# Patient Record
Sex: FEMALE | Race: WHITE | NOT HISPANIC OR LATINO
[De-identification: names, ages, dates, MRNs, and addresses within clinical notes are randomized per-mention and may not be internally consistent; named-entity substitution may affect disease eponyms.]

---

## 2017-01-23 ENCOUNTER — RX RENEWAL (OUTPATIENT)
Age: 57
End: 2017-01-23

## 2017-01-25 ENCOUNTER — RX RENEWAL (OUTPATIENT)
Age: 57
End: 2017-01-25

## 2017-01-26 ENCOUNTER — RX RENEWAL (OUTPATIENT)
Age: 57
End: 2017-01-26

## 2017-02-10 ENCOUNTER — MESSAGE (OUTPATIENT)
Age: 57
End: 2017-02-10

## 2017-03-12 ENCOUNTER — RX RENEWAL (OUTPATIENT)
Age: 57
End: 2017-03-12

## 2017-03-13 ENCOUNTER — RX RENEWAL (OUTPATIENT)
Age: 57
End: 2017-03-13

## 2017-04-13 ENCOUNTER — RX RENEWAL (OUTPATIENT)
Age: 57
End: 2017-04-13

## 2017-05-19 ENCOUNTER — RX RENEWAL (OUTPATIENT)
Age: 57
End: 2017-05-19

## 2017-05-24 ENCOUNTER — RX RENEWAL (OUTPATIENT)
Age: 57
End: 2017-05-24

## 2017-05-26 ENCOUNTER — RX RENEWAL (OUTPATIENT)
Age: 57
End: 2017-05-26

## 2017-07-21 ENCOUNTER — RX RENEWAL (OUTPATIENT)
Age: 57
End: 2017-07-21

## 2017-07-25 ENCOUNTER — RX RENEWAL (OUTPATIENT)
Age: 57
End: 2017-07-25

## 2017-07-28 ENCOUNTER — APPOINTMENT (OUTPATIENT)
Dept: FAMILY MEDICINE | Facility: CLINIC | Age: 57
End: 2017-07-28

## 2017-07-28 ENCOUNTER — APPOINTMENT (OUTPATIENT)
Dept: FAMILY MEDICINE | Facility: CLINIC | Age: 57
End: 2017-07-28
Payer: COMMERCIAL

## 2017-07-28 VITALS
RESPIRATION RATE: 16 BRPM | SYSTOLIC BLOOD PRESSURE: 120 MMHG | BODY MASS INDEX: 30.95 KG/M2 | HEIGHT: 71 IN | WEIGHT: 221.06 LBS | OXYGEN SATURATION: 97 % | DIASTOLIC BLOOD PRESSURE: 90 MMHG | HEART RATE: 62 BPM

## 2017-07-28 DIAGNOSIS — Z86.39 PERSONAL HISTORY OF OTHER ENDOCRINE, NUTRITIONAL AND METABOLIC DISEASE: ICD-10-CM

## 2017-07-28 PROCEDURE — 99396 PREV VISIT EST AGE 40-64: CPT

## 2017-08-04 ENCOUNTER — APPOINTMENT (OUTPATIENT)
Dept: FAMILY MEDICINE | Facility: CLINIC | Age: 57
End: 2017-08-04

## 2017-08-31 LAB
ALBUMIN SERPL ELPH-MCNC: 4.5 G/DL
ALP BLD-CCNC: 63 U/L
ALT SERPL-CCNC: 26 U/L
ANION GAP SERPL CALC-SCNC: 16 MMOL/L
AST SERPL-CCNC: 20 U/L
BASOPHILS # BLD AUTO: 0.02 K/UL
BASOPHILS NFR BLD AUTO: 0.5 %
BILIRUB SERPL-MCNC: 0.4 MG/DL
BUN SERPL-MCNC: 15 MG/DL
CALCIUM SERPL-MCNC: 10.1 MG/DL
CHLORIDE SERPL-SCNC: 104 MMOL/L
CHOLEST SERPL-MCNC: 242 MG/DL
CHOLEST/HDLC SERPL: 2.7 RATIO
CO2 SERPL-SCNC: 22 MMOL/L
CREAT SERPL-MCNC: 0.96 MG/DL
EOSINOPHIL # BLD AUTO: 0.13 K/UL
EOSINOPHIL NFR BLD AUTO: 2.9 %
GLUCOSE SERPL-MCNC: 85 MG/DL
HCT VFR BLD CALC: 42.5 %
HDLC SERPL-MCNC: 90 MG/DL
HGB BLD-MCNC: 13.6 G/DL
IMM GRANULOCYTES NFR BLD AUTO: 0.2 %
LDLC SERPL CALC-MCNC: 113 MG/DL
LYMPHOCYTES # BLD AUTO: 1.7 K/UL
LYMPHOCYTES NFR BLD AUTO: 38.3 %
MAN DIFF?: NORMAL
MCHC RBC-ENTMCNC: 29.9 PG
MCHC RBC-ENTMCNC: 32 GM/DL
MCV RBC AUTO: 93.4 FL
MONOCYTES # BLD AUTO: 0.39 K/UL
MONOCYTES NFR BLD AUTO: 8.8 %
NEUTROPHILS # BLD AUTO: 2.19 K/UL
NEUTROPHILS NFR BLD AUTO: 49.3 %
PLATELET # BLD AUTO: 223 K/UL
POTASSIUM SERPL-SCNC: 4.2 MMOL/L
PROT SERPL-MCNC: 7.4 G/DL
RBC # BLD: 4.55 M/UL
RBC # FLD: 13.7 %
SODIUM SERPL-SCNC: 142 MMOL/L
TRIGL SERPL-MCNC: 196 MG/DL
WBC # FLD AUTO: 4.44 K/UL

## 2017-09-07 LAB
25(OH)D3 SERPL-MCNC: 38.3 NG/ML
HBA1C MFR BLD HPLC: 5.2 %
TSH SERPL-ACNC: 1.58 UIU/ML

## 2017-09-18 ENCOUNTER — RX RENEWAL (OUTPATIENT)
Age: 57
End: 2017-09-18

## 2017-11-06 ENCOUNTER — RX RENEWAL (OUTPATIENT)
Age: 57
End: 2017-11-06

## 2017-11-08 ENCOUNTER — TRANSCRIPTION ENCOUNTER (OUTPATIENT)
Age: 57
End: 2017-11-08

## 2017-12-10 ENCOUNTER — RX RENEWAL (OUTPATIENT)
Age: 57
End: 2017-12-10

## 2018-01-16 ENCOUNTER — RX RENEWAL (OUTPATIENT)
Age: 58
End: 2018-01-16

## 2018-01-17 ENCOUNTER — RX RENEWAL (OUTPATIENT)
Age: 58
End: 2018-01-17

## 2018-01-31 ENCOUNTER — RX RENEWAL (OUTPATIENT)
Age: 58
End: 2018-01-31

## 2018-02-05 ENCOUNTER — MEDICATION RENEWAL (OUTPATIENT)
Age: 58
End: 2018-02-05

## 2018-02-05 ENCOUNTER — RX RENEWAL (OUTPATIENT)
Age: 58
End: 2018-02-05

## 2018-02-13 ENCOUNTER — APPOINTMENT (OUTPATIENT)
Dept: FAMILY MEDICINE | Facility: CLINIC | Age: 58
End: 2018-02-13
Payer: COMMERCIAL

## 2018-02-13 VITALS
WEIGHT: 220.5 LBS | OXYGEN SATURATION: 95 % | BODY MASS INDEX: 30.87 KG/M2 | DIASTOLIC BLOOD PRESSURE: 98 MMHG | HEART RATE: 64 BPM | HEIGHT: 71 IN | RESPIRATION RATE: 16 BRPM | SYSTOLIC BLOOD PRESSURE: 138 MMHG

## 2018-02-13 PROCEDURE — 99214 OFFICE O/P EST MOD 30 MIN: CPT

## 2018-03-27 ENCOUNTER — RX RENEWAL (OUTPATIENT)
Age: 58
End: 2018-03-27

## 2018-07-03 ENCOUNTER — RX RENEWAL (OUTPATIENT)
Age: 58
End: 2018-07-03

## 2018-07-16 ENCOUNTER — RX RENEWAL (OUTPATIENT)
Age: 58
End: 2018-07-16

## 2018-07-17 LAB
25(OH)D3 SERPL-MCNC: 38.3 NG/ML
ALBUMIN SERPL ELPH-MCNC: 4.5 G/DL
ALP BLD-CCNC: 77 U/L
ALT SERPL-CCNC: 17 U/L
ANION GAP SERPL CALC-SCNC: 11 MMOL/L
AST SERPL-CCNC: 18 U/L
BASOPHILS # BLD AUTO: 0.02 K/UL
BASOPHILS NFR BLD AUTO: 0.5 %
BILIRUB SERPL-MCNC: 0.4 MG/DL
BUN SERPL-MCNC: 16 MG/DL
CALCIUM SERPL-MCNC: 9.6 MG/DL
CHLORIDE SERPL-SCNC: 105 MMOL/L
CHOLEST SERPL-MCNC: 209 MG/DL
CHOLEST/HDLC SERPL: 2.5 RATIO
CO2 SERPL-SCNC: 23 MMOL/L
CREAT SERPL-MCNC: 0.81 MG/DL
EOSINOPHIL # BLD AUTO: 0.07 K/UL
EOSINOPHIL NFR BLD AUTO: 1.7 %
GLUCOSE SERPL-MCNC: 85 MG/DL
HBA1C MFR BLD HPLC: 5.3 %
HCT VFR BLD CALC: 41.3 %
HDLC SERPL-MCNC: 84 MG/DL
HGB BLD-MCNC: 13.7 G/DL
IMM GRANULOCYTES NFR BLD AUTO: 0.2 %
LDLC SERPL CALC-MCNC: 107 MG/DL
LYMPHOCYTES # BLD AUTO: 1.6 K/UL
LYMPHOCYTES NFR BLD AUTO: 39.4 %
MAN DIFF?: NORMAL
MCHC RBC-ENTMCNC: 31.1 PG
MCHC RBC-ENTMCNC: 33.2 GM/DL
MCV RBC AUTO: 93.7 FL
MONOCYTES # BLD AUTO: 0.31 K/UL
MONOCYTES NFR BLD AUTO: 7.6 %
NEUTROPHILS # BLD AUTO: 2.05 K/UL
NEUTROPHILS NFR BLD AUTO: 50.6 %
PLATELET # BLD AUTO: 214 K/UL
POTASSIUM SERPL-SCNC: 4.5 MMOL/L
PROT SERPL-MCNC: 7 G/DL
RBC # BLD: 4.41 M/UL
RBC # FLD: 13.9 %
SODIUM SERPL-SCNC: 139 MMOL/L
TRIGL SERPL-MCNC: 89 MG/DL
TSH SERPL-ACNC: 0.27 UIU/ML
WBC # FLD AUTO: 4.06 K/UL

## 2018-07-18 ENCOUNTER — RX RENEWAL (OUTPATIENT)
Age: 58
End: 2018-07-18

## 2018-08-06 ENCOUNTER — RX RENEWAL (OUTPATIENT)
Age: 58
End: 2018-08-06

## 2018-08-17 ENCOUNTER — APPOINTMENT (OUTPATIENT)
Dept: FAMILY MEDICINE | Facility: CLINIC | Age: 58
End: 2018-08-17
Payer: COMMERCIAL

## 2018-08-17 VITALS
HEART RATE: 64 BPM | HEIGHT: 71 IN | SYSTOLIC BLOOD PRESSURE: 108 MMHG | WEIGHT: 213.06 LBS | BODY MASS INDEX: 29.83 KG/M2 | OXYGEN SATURATION: 99 % | DIASTOLIC BLOOD PRESSURE: 100 MMHG | RESPIRATION RATE: 16 BRPM

## 2018-08-17 VITALS — DIASTOLIC BLOOD PRESSURE: 82 MMHG | SYSTOLIC BLOOD PRESSURE: 120 MMHG

## 2018-08-17 DIAGNOSIS — Z86.69 PERSONAL HISTORY OF OTHER DISEASES OF THE NERVOUS SYSTEM AND SENSE ORGANS: ICD-10-CM

## 2018-08-17 DIAGNOSIS — Z86.39 PERSONAL HISTORY OF OTHER ENDOCRINE, NUTRITIONAL AND METABOLIC DISEASE: ICD-10-CM

## 2018-08-17 DIAGNOSIS — H00.036 ABSCESS OF EYELID LEFT EYE, UNSPECIFIED EYELID: ICD-10-CM

## 2018-08-17 PROCEDURE — 99396 PREV VISIT EST AGE 40-64: CPT

## 2018-08-27 PROBLEM — Z86.39 HISTORY OF OBESITY: Status: RESOLVED | Noted: 2017-07-28 | Resolved: 2018-08-27

## 2018-08-27 PROBLEM — Z86.69 HISTORY OF CONJUNCTIVITIS: Status: RESOLVED | Noted: 2018-02-13 | Resolved: 2018-08-27

## 2018-08-27 NOTE — REVIEW OF SYSTEMS
[Nocturia] : nocturia [Joint Pain] : joint pain [Back Pain] : back pain [Headache] : headache [Insomnia] : insomnia [Anxiety] : anxiety [Depression] : depression [Vision Problems] : vision problems [Fever] : no fever [Chills] : no chills [Fatigue] : no fatigue [Hot Flashes] : no hot flashes [Night Sweats] : no night sweats [Recent Change In Weight] : ~T no recent weight change [Discharge] : no discharge [Pain] : no pain [Redness] : no redness [Dryness] : no dryness  [Itching] : no itching [Earache] : no earache [Hearing Loss] : no hearing loss [Nosebleed] : no nosebleeds [Hoarseness] : no hoarseness [Nasal Discharge] : no nasal discharge [Sore Throat] : no sore throat [Postnasal Drip] : no postnasal drip [Chest Pain] : no chest pain [Palpitations] : no palpitations [Leg Claudication] : no leg claudication [Lower Ext Edema] : no lower extremity edema [Orthopnea] : no orthopnea [Paroysmal Nocturnal Dyspnea] : no paroysmal nocturnal dyspnea [Shortness Of Breath] : no shortness of breath [Wheezing] : no wheezing [Cough] : no cough [Dyspnea on Exertion] : no dyspnea on exertion [Abdominal Pain] : no abdominal pain [Nausea] : no nausea [Constipation] : no constipation [Diarrhea] : diarrhea [Vomiting] : no vomiting [Heartburn] : no heartburn [Melena] : no melena [Dysuria] : no dysuria [Incontinence] : no incontinence [Poor Libido] : libido not poor [Hematuria] : no hematuria [Frequency] : no frequency [Vaginal Discharge] : no vaginal discharge [Dysmenorrhea] : no dysmenorrhea [Joint Stiffness] : no joint stiffness [Joint Swelling] : no joint swelling [Muscle Weakness] : no muscle weakness [Itching] : no itching [Mole Changes] : no mole changes [Nail Changes] : no nail changes [Hair Changes] : no hair changes [Skin Rash] : no skin rash [Dizziness] : no dizziness [Fainting] : no fainting [Confusion] : no confusion [Memory Loss] : no memory loss [Unsteady Walking] : no ataxia [Suicidal] : not suicidal [Easy Bleeding] : no easy bleeding [Easy Bruising] : no easy bruising [Swollen Glands] : no swollen glands [FreeTextEntry3] : reading glasses  [FreeTextEntry7] : colonoscopy 2015, due 2020 [FreeTextEntry8] : lmp hysterectomy age 43 yo- pelvic floor [FreeTextEntry9] : right knee and hip [de-identified] : migranes [de-identified] : insomnia

## 2018-08-27 NOTE — COUNSELING
[Weight management counseling provided] : Weight management [Healthy eating counseling provided] : healthy eating [Activity counseling provided] : activity [___ min/wk activity recommended] : [unfilled] min/wk activity recommended [Engage in a relaxing activity] : Engage in a relaxing activity

## 2018-08-27 NOTE — PHYSICAL EXAM
[No Acute Distress] : no acute distress [Well Nourished] : well nourished [Well Developed] : well developed [Well-Appearing] : well-appearing [Normal Voice/Communication] : normal voice/communication [Normal Sclera/Conjunctiva] : normal sclera/conjunctiva [PERRL] : pupils equal round and reactive to light [EOMI] : extraocular movements intact [Normal Outer Ear/Nose] : the outer ears and nose were normal in appearance [Normal Oropharynx] : the oropharynx was normal [Normal TMs] : both tympanic membranes were normal [No JVD] : no jugular venous distention [Supple] : supple [No Lymphadenopathy] : no lymphadenopathy [Thyroid Normal, No Nodules] : the thyroid was normal and there were no nodules present [No Respiratory Distress] : no respiratory distress  [Clear to Auscultation] : lungs were clear to auscultation bilaterally [No Accessory Muscle Use] : no accessory muscle use [Normal Rate] : normal rate  [Regular Rhythm] : with a regular rhythm [Normal S1, S2] : normal S1 and S2 [Pedal Pulses Present] : the pedal pulses are present [No Edema] : there was no peripheral edema [No Extremity Clubbing/Cyanosis] : no extremity clubbing/cyanosis [Soft] : abdomen soft [Non Tender] : non-tender [Non-distended] : non-distended [No Masses] : no abdominal mass palpated [No HSM] : no HSM [Normal Supraclavicular Nodes] : no supraclavicular lymphadenopathy [Normal Anterior Cervical Nodes] : no anterior cervical lymphadenopathy [No Joint Swelling] : no joint swelling [Grossly Normal Strength/Tone] : grossly normal strength/tone [No Rash] : no rash [No Skin Lesions] : no skin lesions [Normal Gait] : normal gait [Coordination Grossly Intact] : coordination grossly intact [No Focal Deficits] : no focal deficits [Speech Grossly Normal] : speech grossly normal [Memory Grossly Normal] : memory grossly normal [Normal Affect] : the affect was normal [Alert and Oriented x3] : oriented to person, place, and time [Normal Mood] : the mood was normal [Normal Insight/Judgement] : insight and judgment were intact

## 2018-08-27 NOTE — ASSESSMENT
[FreeTextEntry1] : blood work results reviewed with patient during visit\par RTO 3 months for f/u BP, weight ck

## 2018-09-06 ENCOUNTER — RX RENEWAL (OUTPATIENT)
Age: 58
End: 2018-09-06

## 2018-09-19 ENCOUNTER — APPOINTMENT (OUTPATIENT)
Dept: FAMILY MEDICINE | Facility: CLINIC | Age: 58
End: 2018-09-19
Payer: COMMERCIAL

## 2018-09-19 VITALS
WEIGHT: 209.56 LBS | TEMPERATURE: 98.2 F | OXYGEN SATURATION: 100 % | RESPIRATION RATE: 16 BRPM | HEIGHT: 71 IN | BODY MASS INDEX: 29.34 KG/M2 | HEART RATE: 60 BPM | SYSTOLIC BLOOD PRESSURE: 138 MMHG | DIASTOLIC BLOOD PRESSURE: 90 MMHG

## 2018-09-19 LAB
BILIRUB UR QL STRIP: NEGATIVE
CLARITY UR: ABNORMAL
COLLECTION METHOD: NORMAL
GLUCOSE UR-MCNC: NEGATIVE
HCG UR QL: 0.2 EU/DL
HGB UR QL STRIP.AUTO: ABNORMAL
KETONES UR-MCNC: NEGATIVE
LEUKOCYTE ESTERASE UR QL STRIP: ABNORMAL
NITRITE UR QL STRIP: NEGATIVE
PH UR STRIP: 6.5
PROT UR STRIP-MCNC: NEGATIVE
SP GR UR STRIP: 1.01

## 2018-09-19 PROCEDURE — 81003 URINALYSIS AUTO W/O SCOPE: CPT | Mod: QW

## 2018-09-19 PROCEDURE — 99214 OFFICE O/P EST MOD 30 MIN: CPT | Mod: 25

## 2018-09-19 NOTE — PHYSICAL EXAM
[No Acute Distress] : no acute distress [Well Nourished] : well nourished [Well Developed] : well developed [Well-Appearing] : well-appearing [Normal Voice/Communication] : normal voice/communication [Normal Sclera/Conjunctiva] : normal sclera/conjunctiva [No Respiratory Distress] : no respiratory distress  [Clear to Auscultation] : lungs were clear to auscultation bilaterally [No Accessory Muscle Use] : no accessory muscle use [Normal Rate] : normal rate  [Regular Rhythm] : with a regular rhythm [Normal S1, S2] : normal S1 and S2 [Soft] : abdomen soft [Non Tender] : non-tender [Non-distended] : non-distended [No Masses] : no abdominal mass palpated [No HSM] : no HSM

## 2018-09-19 NOTE — HISTORY OF PRESENT ILLNESS
[FreeTextEntry8] : Patient presents c/o symptoms of frequent urination. Has h/o multiple UTIs, and takes prophylactic antibiotic after intercourse. No fever, no back pain. \par \par ROS: negative except as noted above\par

## 2018-09-27 LAB — BACTERIA UR CULT: ABNORMAL

## 2018-10-08 ENCOUNTER — RX RENEWAL (OUTPATIENT)
Age: 58
End: 2018-10-08

## 2018-10-10 ENCOUNTER — RX RENEWAL (OUTPATIENT)
Age: 58
End: 2018-10-10

## 2018-10-11 ENCOUNTER — RX RENEWAL (OUTPATIENT)
Age: 58
End: 2018-10-11

## 2018-10-11 RX ORDER — ZOLPIDEM TARTRATE 5 MG/1
5 TABLET ORAL
Qty: 30 | Refills: 0 | Status: DISCONTINUED | COMMUNITY
Start: 2017-12-10 | End: 2018-10-11

## 2018-11-19 ENCOUNTER — RX RENEWAL (OUTPATIENT)
Age: 58
End: 2018-11-19

## 2018-12-02 ENCOUNTER — TRANSCRIPTION ENCOUNTER (OUTPATIENT)
Age: 58
End: 2018-12-02

## 2018-12-03 ENCOUNTER — APPOINTMENT (OUTPATIENT)
Dept: UROGYNECOLOGY | Facility: CLINIC | Age: 58
End: 2018-12-03
Payer: COMMERCIAL

## 2018-12-03 LAB
BILIRUB UR QL STRIP: NORMAL
CLARITY UR: CLEAR
COLLECTION METHOD: NORMAL
GLUCOSE UR-MCNC: NORMAL
HCG UR QL: 0.2 EU/DL
HGB UR QL STRIP.AUTO: NORMAL
KETONES UR-MCNC: NORMAL
LEUKOCYTE ESTERASE UR QL STRIP: NORMAL
NITRITE UR QL STRIP: NORMAL
PH UR STRIP: 5.5
PROT UR STRIP-MCNC: NORMAL
SP GR UR STRIP: 1.01

## 2018-12-03 PROCEDURE — 99215 OFFICE O/P EST HI 40 MIN: CPT

## 2018-12-17 ENCOUNTER — APPOINTMENT (OUTPATIENT)
Dept: UROGYNECOLOGY | Facility: CLINIC | Age: 58
End: 2018-12-17
Payer: COMMERCIAL

## 2018-12-17 ENCOUNTER — OUTPATIENT (OUTPATIENT)
Dept: OUTPATIENT SERVICES | Facility: HOSPITAL | Age: 58
LOS: 1 days | End: 2018-12-17
Payer: COMMERCIAL

## 2018-12-17 VITALS — HEART RATE: 64 BPM | DIASTOLIC BLOOD PRESSURE: 85 MMHG | SYSTOLIC BLOOD PRESSURE: 138 MMHG

## 2018-12-17 DIAGNOSIS — Z90.710 ACQUIRED ABSENCE OF BOTH CERVIX AND UTERUS: Chronic | ICD-10-CM

## 2018-12-17 DIAGNOSIS — Z01.818 ENCOUNTER FOR OTHER PREPROCEDURAL EXAMINATION: ICD-10-CM

## 2018-12-17 PROCEDURE — 52000 CYSTOURETHROSCOPY: CPT

## 2018-12-18 DIAGNOSIS — N39.3 STRESS INCONTINENCE (FEMALE) (MALE): ICD-10-CM

## 2018-12-20 ENCOUNTER — OUTPATIENT (OUTPATIENT)
Dept: OUTPATIENT SERVICES | Facility: HOSPITAL | Age: 58
LOS: 1 days | End: 2018-12-20

## 2018-12-20 ENCOUNTER — APPOINTMENT (OUTPATIENT)
Dept: UROGYNECOLOGY | Facility: CLINIC | Age: 58
End: 2018-12-20
Payer: COMMERCIAL

## 2018-12-20 DIAGNOSIS — Z90.710 ACQUIRED ABSENCE OF BOTH CERVIX AND UTERUS: Chronic | ICD-10-CM

## 2018-12-20 PROCEDURE — 51784 ANAL/URINARY MUSCLE STUDY: CPT | Mod: 26

## 2018-12-20 PROCEDURE — 51797 INTRAABDOMINAL PRESSURE TEST: CPT | Mod: 26

## 2018-12-20 PROCEDURE — 51729 CYSTOMETROGRAM W/VP&UP: CPT | Mod: 26

## 2019-01-07 ENCOUNTER — APPOINTMENT (OUTPATIENT)
Dept: UROGYNECOLOGY | Facility: CLINIC | Age: 59
End: 2019-01-07
Payer: COMMERCIAL

## 2019-01-07 DIAGNOSIS — N39.3 STRESS INCONTINENCE (FEMALE) (MALE): ICD-10-CM

## 2019-01-07 PROCEDURE — 99214 OFFICE O/P EST MOD 30 MIN: CPT

## 2019-01-07 RX ORDER — MOXIFLOXACIN OPHTHALMIC 5 MG/ML
0.5 SOLUTION/ DROPS OPHTHALMIC 3 TIMES DAILY
Qty: 1 | Refills: 3 | Status: COMPLETED | COMMUNITY
Start: 2018-02-13 | End: 2019-01-07

## 2019-01-07 RX ORDER — CIPROFLOXACIN HYDROCHLORIDE 500 MG/1
500 TABLET, FILM COATED ORAL TWICE DAILY
Qty: 10 | Refills: 0 | Status: COMPLETED | COMMUNITY
Start: 2018-09-19 | End: 2019-01-07

## 2019-01-07 NOTE — HISTORY OF PRESENT ILLNESS
[FreeTextEntry1] : Patient returns today for followup on her pelvic organ prolapse. Review of symptoms was unchanged from her visit here December 3, 2018. On exam that day she ectopic U. stage II pelvic organ prolapse with vaginal vault prolapse cystocele and rectocele. She underwent urodynamic testing which revealed stress urinary incontinence. The patient had a cystoscopy in December which was negative. I reviewed these findings with her.Treatment options for the prolapse were discussed and included doing nothing, Kegel exercises and behavioral modification, a pessary, or surgical correction. She is interested in surgical correction.Surgically we discussed the abdominal vs the vaginal routes. Abdominally we discussed sacral colpopexy either via laparotomy or laparoscopically and robotically.  Vaginally we discussed a native tissue repair versus vaginal mesh reconstruction. We discussed the FDA warning on transvaginal mesh. We discussed the use of biologic for repairs. She is interested in surgical correction and wishes to proceed via a laparoscopic/robotically with a sacral colpopexy. She was shown an example of the mesh. We discussed the stress incontinence as well as treatment options and she wishes to proceed with a mid urethral sling at the time of surgical correction of the stress incontinence. We discussed the possibility of failure and going home with the catheter. All questions were answered and she wishes to schedule surgery

## 2019-02-04 ENCOUNTER — APPOINTMENT (OUTPATIENT)
Dept: UROGYNECOLOGY | Facility: CLINIC | Age: 59
End: 2019-02-04
Payer: COMMERCIAL

## 2019-02-04 DIAGNOSIS — N36.41 HYPERMOBILITY OF URETHRA: ICD-10-CM

## 2019-02-04 DIAGNOSIS — N81.11 CYSTOCELE, MIDLINE: ICD-10-CM

## 2019-02-04 PROCEDURE — 99214 OFFICE O/P EST MOD 30 MIN: CPT

## 2019-02-04 NOTE — PHYSICAL EXAM
[No Acute Distress] : in no acute distress [Well developed] : well developed [Well Nourished] : ~L well nourished [Oriented x3] : oriented to person, place, and time [Normal Mood/Affect] : mood and affect are normal [Soft, Nontender] : the abdomen was soft and nontender [Warm and Dry] : was warm and dry to touch [Normal Gait] : gait was normal [Normal] : normal external genitalia [Vulvar Atrophy] : vulvar atrophy [Labia Majora] : were normal [Labia Minora] : were normal [Normal Appearance] : general appearance was normal [Atrophy] : atrophy [Cystocele] : a cystocele [No Bleeding] : there was no active vaginal bleeding [2] : 2 [Aa ____] : Aa [unfilled] [Ba ____] : Ba [unfilled] [C ____] : C [unfilled] [GH ____] : GH [unfilled] [PB ____] : PB [unfilled] [TVL ____] : TVL  [unfilled] [Ap ____] : Ap [unfilled] [Bp ____] : Bp [unfilled] [Absent] : absent [Uterine Adnexae] : were not tender and not enlarged [Exam Deferred] : was deferred [Anxiety] : patient is not anxious [Tenderness] : ~T no ~M abdominal tenderness observed [Distended] : not distended [H/Smegaly] : no hepatosplenomegaly [FreeTextEntry3] : + hypermobility based on POPQ [FreeTextEntry4] : + vv prolaspe

## 2019-02-04 NOTE — DISCUSSION/SUMMARY
[Reviewed Clinical Lab Test(s)] : Results of clinical tests were reviewed. [Discuss Alternatives/Risks/Benefits w/Patient] : All alternatives, risks, and benefits were discussed with the patient/family and all questions were answered.  Patient expressed good understanding and appreciates the importance of follow up as recommended. [FreeTextEntry1] : Plan: Robotic assisted bilateral salpingectomy, sacrocolpopexy, retropubic MUS, cystoscopy, possible anterior/posterior repair, possible oophorectomy, possible laparotomy \par \par Counseling: \par Patient counseled on r/b/a of the above procedures. Alternatives include continue observations, PFMT, pessary use, and surgery, and pt continues to want to proceed with surgical management. Risks were discussed. Risks include bleeding, possibly needing a transfusion, infection, and damage to surrounding structures such as bowel, bladder, ureters, blood vessels, and nerves. She also understands that there is a risk of needing to convert to an open procedure or laparotomy. We discussed the risks and benefits of salpingectomy and oophorectomy at time of surgery. Pt wishes to proceed with salpingectomy. Ovaries will remain in situ unless there are abnormal findings, at which time pt would like an oophorectomy. There is also a risk of pain - short term following surgery and a risk of chronic pain or dyspareunia. She understands there is a risk of recurrent prolapse which may require additional treatment or surgery in the future.  We reviewed her UDS findings of stress incontinence, and pt wishes to proceed with a midurethral sling. We discussed retropubic versus trans-obturator approaches including risk of pain, bladder injury, retention, and success, and pt wishes to proceed with a retropubic MS. We discussed the possibility of UTI or urinary retention following surgery with the possible need for prolonged catheterization. We also discussed the post-operative restrictions and expectations. Patient expressed understanding and wishes to proceed. We discussed the risks and benefits of using [place] scientific mesh as well as the 60 minutes report regarding the resin.  [employer] response to patients was given to her.  She wishes to proceed with the use of the BSC mesh in the surgery. Conest was signed.\par \par

## 2019-02-06 ENCOUNTER — RESULT REVIEW (OUTPATIENT)
Age: 59
End: 2019-02-06

## 2019-02-06 LAB — BACTERIA UR CULT: NORMAL

## 2019-02-08 ENCOUNTER — OUTPATIENT (OUTPATIENT)
Dept: OUTPATIENT SERVICES | Facility: HOSPITAL | Age: 59
LOS: 1 days | End: 2019-02-08
Payer: COMMERCIAL

## 2019-02-08 VITALS
WEIGHT: 199.08 LBS | HEIGHT: 71 IN | TEMPERATURE: 99 F | SYSTOLIC BLOOD PRESSURE: 131 MMHG | OXYGEN SATURATION: 98 % | HEART RATE: 61 BPM | DIASTOLIC BLOOD PRESSURE: 84 MMHG

## 2019-02-08 DIAGNOSIS — N81.6 RECTOCELE: ICD-10-CM

## 2019-02-08 DIAGNOSIS — N99.3 PROLAPSE OF VAGINAL VAULT AFTER HYSTERECTOMY: ICD-10-CM

## 2019-02-08 DIAGNOSIS — I10 ESSENTIAL (PRIMARY) HYPERTENSION: ICD-10-CM

## 2019-02-08 DIAGNOSIS — N39.3 STRESS INCONTINENCE (FEMALE) (MALE): ICD-10-CM

## 2019-02-08 DIAGNOSIS — N81.11 CYSTOCELE, MIDLINE: ICD-10-CM

## 2019-02-08 DIAGNOSIS — Z96.642 PRESENCE OF LEFT ARTIFICIAL HIP JOINT: Chronic | ICD-10-CM

## 2019-02-08 DIAGNOSIS — E03.9 HYPOTHYROIDISM, UNSPECIFIED: ICD-10-CM

## 2019-02-08 DIAGNOSIS — Z01.84 ENCOUNTER FOR ANTIBODY RESPONSE EXAMINATION: ICD-10-CM

## 2019-02-08 DIAGNOSIS — Z90.710 ACQUIRED ABSENCE OF BOTH CERVIX AND UTERUS: Chronic | ICD-10-CM

## 2019-02-08 LAB
ANION GAP SERPL CALC-SCNC: 13 MMOL/L — SIGNIFICANT CHANGE UP (ref 5–17)
BLD GP AB SCN SERPL QL: NEGATIVE — SIGNIFICANT CHANGE UP
BUN SERPL-MCNC: 16 MG/DL — SIGNIFICANT CHANGE UP (ref 7–23)
CALCIUM SERPL-MCNC: 9.8 MG/DL — SIGNIFICANT CHANGE UP (ref 8.4–10.5)
CHLORIDE SERPL-SCNC: 106 MMOL/L — SIGNIFICANT CHANGE UP (ref 96–108)
CO2 SERPL-SCNC: 23 MMOL/L — SIGNIFICANT CHANGE UP (ref 22–31)
CREAT SERPL-MCNC: 0.73 MG/DL — SIGNIFICANT CHANGE UP (ref 0.5–1.3)
GLUCOSE SERPL-MCNC: 90 MG/DL — SIGNIFICANT CHANGE UP (ref 70–99)
HCT VFR BLD CALC: 41.8 % — SIGNIFICANT CHANGE UP (ref 34.5–45)
HGB BLD-MCNC: 13.4 G/DL — SIGNIFICANT CHANGE UP (ref 11.5–15.5)
MCHC RBC-ENTMCNC: 30.2 PG — SIGNIFICANT CHANGE UP (ref 27–34)
MCHC RBC-ENTMCNC: 32.1 GM/DL — SIGNIFICANT CHANGE UP (ref 32–36)
MCV RBC AUTO: 94.4 FL — SIGNIFICANT CHANGE UP (ref 80–100)
PLATELET # BLD AUTO: 205 K/UL — SIGNIFICANT CHANGE UP (ref 150–400)
POTASSIUM SERPL-MCNC: 4 MMOL/L — SIGNIFICANT CHANGE UP (ref 3.5–5.3)
POTASSIUM SERPL-SCNC: 4 MMOL/L — SIGNIFICANT CHANGE UP (ref 3.5–5.3)
RBC # BLD: 4.43 M/UL — SIGNIFICANT CHANGE UP (ref 3.8–5.2)
RBC # FLD: 14.1 % — SIGNIFICANT CHANGE UP (ref 10.3–14.5)
RH IG SCN BLD-IMP: POSITIVE — SIGNIFICANT CHANGE UP
SODIUM SERPL-SCNC: 142 MMOL/L — SIGNIFICANT CHANGE UP (ref 135–145)
WBC # BLD: 4.43 K/UL — SIGNIFICANT CHANGE UP (ref 3.8–10.5)
WBC # FLD AUTO: 4.43 K/UL — SIGNIFICANT CHANGE UP (ref 3.8–10.5)

## 2019-02-08 PROCEDURE — 86850 RBC ANTIBODY SCREEN: CPT

## 2019-02-08 PROCEDURE — 86901 BLOOD TYPING SEROLOGIC RH(D): CPT

## 2019-02-08 PROCEDURE — 80048 BASIC METABOLIC PNL TOTAL CA: CPT

## 2019-02-08 PROCEDURE — 86900 BLOOD TYPING SEROLOGIC ABO: CPT

## 2019-02-08 PROCEDURE — G0463: CPT

## 2019-02-08 PROCEDURE — 85027 COMPLETE CBC AUTOMATED: CPT

## 2019-02-08 RX ORDER — CEFOTETAN DISODIUM 1 G
2 VIAL (EA) INJECTION ONCE
Qty: 0 | Refills: 0 | Status: DISCONTINUED | OUTPATIENT
Start: 2019-02-19 | End: 2019-03-06

## 2019-02-08 RX ORDER — LIDOCAINE HCL 20 MG/ML
0.2 VIAL (ML) INJECTION ONCE
Qty: 0 | Refills: 0 | Status: DISCONTINUED | OUTPATIENT
Start: 2019-02-19 | End: 2019-03-06

## 2019-02-08 NOTE — H&P PST ADULT - PROBLEM SELECTOR PLAN 1
Cystoscopy, sling, Robotic laparoscopic Colpopexy on 2/19/2019.  Pre- Op instructions  discussed  CBc,BMp,type and screen sent  urine culture - reviewed from 2/4/19- no growth

## 2019-02-08 NOTE — H&P PST ADULT - PMH
Bulging lumbar disc  L4-5; treated with PINA (last in June 2013)  Cervical disc disease  herniated discs; unsure levels (asymptomatic)  Cluster headache    Diverticulosis  asymptomatic  Esophageal spasm  endoscopy and colonoscopy negative; no reflux  HTN (hypertension)    Hyperlipemia    Hypothyroid    Low back pain    Migraine    Osteoarthritis    Sciatica  left > right  Urinary frequency Bulging lumbar disc  L4-5; treated with PINA (last in June 2013)  Cervical disc disease  herniated discs; unsure levels (asymptomatic)  Cluster headache    Diverticulosis  asymptomatic  Esophageal spasm  endoscopy and colonoscopy negative; no reflux  HTN (hypertension)    Hyperlipemia    Hypothyroid    Osteoarthritis    Prolapse of vaginal vault after hysterectomy    Rectocele    Stress incontinence in female  Cystocele ,Midline

## 2019-02-08 NOTE — H&P PST ADULT - ACTIVITY
pt is very active able to participate for strenous ecoee pt is very active able to participate for strenuous ecoee

## 2019-02-08 NOTE — H&P PST ADULT - NSANTHOSAYNRD_GEN_A_CORE
No. MEETA screening performed.  STOP BANG Legend: 0-2 = LOW Risk; 3-4 = INTERMEDIATE Risk; 5-8 = HIGH Risk

## 2019-02-08 NOTE — H&P PST ADULT - HISTORY OF PRESENT ILLNESS
54 yo female presents with 8 year history of what she thought was left sciatic pain and treated it conservatively with prn NSAIDS. Pain worsened over last one and a half years. Underwent PT without relief. One cortisone injection without relief. Pain 6/10 at rest and 8-10/10 with activity. Pain relieved in past with aleve (last dose 3 days ago) 59 y/o female with PMH of HTN, HLD, Hypothyroidism , OA s/p left hip replacement in 2014, hysterectomy . Pt has been experiencing vaginal  bulging , stress incontinence followed by GYN . Presents to PST for scheduled Cystoscopy, sling, Robotic laparoscopic Colpopexy on 2/19/2019.

## 2019-02-08 NOTE — H&P PST ADULT - NEGATIVE GENERAL GENITOURINARY SYMPTOMS
no hematuria/no dysuria/no flank pain L/no renal colic/no flank pain R/no urinary hesitancy/no nocturia

## 2019-02-08 NOTE — H&P PST ADULT - PSH
History of total left hip replacement    Normal vaginal delivery  x 3  S/P hysterectomy  2004 after prolapse History of total left hip replacement  2014  Normal vaginal delivery  x 3  S/P hysterectomy  2004 after prolapse

## 2019-02-13 ENCOUNTER — APPOINTMENT (OUTPATIENT)
Dept: FAMILY MEDICINE | Facility: CLINIC | Age: 59
End: 2019-02-13
Payer: COMMERCIAL

## 2019-02-13 VITALS
HEART RATE: 60 BPM | SYSTOLIC BLOOD PRESSURE: 132 MMHG | RESPIRATION RATE: 16 BRPM | DIASTOLIC BLOOD PRESSURE: 80 MMHG | WEIGHT: 199 LBS | HEIGHT: 71 IN | TEMPERATURE: 98.4 F | OXYGEN SATURATION: 97 % | BODY MASS INDEX: 27.86 KG/M2

## 2019-02-13 DIAGNOSIS — Z86.2 PERSONAL HISTORY OF DISEASES OF THE BLOOD AND BLOOD-FORMING ORGANS AND CERTAIN DISORDERS INVOLVING THE IMMUNE MECHANISM: ICD-10-CM

## 2019-02-13 PROCEDURE — 99214 OFFICE O/P EST MOD 30 MIN: CPT

## 2019-02-13 NOTE — REVIEW OF SYSTEMS
[Fever] : no fever [Chills] : no chills [Fatigue] : no fatigue [Hot Flashes] : no hot flashes [Night Sweats] : no night sweats [Recent Change In Weight] : ~T recent weight change [Discharge] : no discharge [Pain] : no pain [Redness] : no redness [Dryness] : no dryness  [Vision Problems] : no vision problems [Itching] : no itching [Earache] : no earache [Hearing Loss] : no hearing loss [Nosebleed] : no nosebleeds [Hoarseness] : no hoarseness [Nasal Discharge] : no nasal discharge [Sore Throat] : no sore throat [Postnasal Drip] : no postnasal drip [Chest Pain] : no chest pain [Palpitations] : no palpitations [Leg Claudication] : no leg claudication [Lower Ext Edema] : no lower extremity edema [Orthopnea] : no orthopnea [Paroysmal Nocturnal Dyspnea] : no paroysmal nocturnal dyspnea [Shortness Of Breath] : no shortness of breath [Wheezing] : no wheezing [Cough] : no cough [Dyspnea on Exertion] : no dyspnea on exertion [Abdominal Pain] : no abdominal pain [Nausea] : no nausea [Constipation] : no constipation [Diarrhea] : diarrhea [Vomiting] : no vomiting [Heartburn] : no heartburn [Melena] : no melena [Dysuria] : no dysuria [Incontinence] : no incontinence [Nocturia] : nocturia [Hematuria] : no hematuria [Frequency] : no frequency [Vaginal Discharge] : no vaginal discharge [Dysmenorrhea] : no dysmenorrhea [Joint Pain] : no joint pain [Joint Stiffness] : no joint stiffness [Joint Swelling] : no joint swelling [Muscle Weakness] : no muscle weakness [Muscle Pain] : no muscle pain [Back Pain] : no back pain [Mole Changes] : no mole changes [Nail Changes] : no nail changes [Hair Changes] : no hair changes [Skin Rash] : no skin rash [Headache] : no headache [Dizziness] : no dizziness [Fainting] : no fainting [Confusion] : no confusion [Memory Loss] : no memory loss [Suicidal] : not suicidal [Insomnia] : insomnia [Anxiety] : no anxiety [Depression] : no depression [Easy Bleeding] : no easy bleeding [Easy Bruising] : no easy bruising [Swollen Glands] : no swollen glands [FreeTextEntry2] : trying to lose weight over past 1 year [FreeTextEntry4] : +tinnitus has seen ENT

## 2019-02-13 NOTE — PHYSICAL EXAM
[No Acute Distress] : no acute distress [Well Nourished] : well nourished [Well Developed] : well developed [Well-Appearing] : well-appearing [Normal Voice/Communication] : normal voice/communication [Normal Sclera/Conjunctiva] : normal sclera/conjunctiva [PERRL] : pupils equal round and reactive to light [EOMI] : extraocular movements intact [Normal Outer Ear/Nose] : the outer ears and nose were normal in appearance [Normal Oropharynx] : the oropharynx was normal [Normal TMs] : both tympanic membranes were normal [No JVD] : no jugular venous distention [Supple] : supple [No Lymphadenopathy] : no lymphadenopathy [Thyroid Normal, No Nodules] : the thyroid was normal and there were no nodules present [No Respiratory Distress] : no respiratory distress  [Clear to Auscultation] : lungs were clear to auscultation bilaterally [No Accessory Muscle Use] : no accessory muscle use [Normal Rate] : normal rate  [Regular Rhythm] : with a regular rhythm [Normal S1, S2] : normal S1 and S2 [Pedal Pulses Present] : the pedal pulses are present [No Edema] : there was no peripheral edema [No Extremity Clubbing/Cyanosis] : no extremity clubbing/cyanosis [Soft] : abdomen soft [Non Tender] : non-tender [Non-distended] : non-distended [No Masses] : no abdominal mass palpated [No HSM] : no HSM [Normal Supraclavicular Nodes] : no supraclavicular lymphadenopathy [Normal Posterior Cervical Nodes] : no posterior cervical lymphadenopathy [Normal Anterior Cervical Nodes] : no anterior cervical lymphadenopathy [No Joint Swelling] : no joint swelling [Grossly Normal Strength/Tone] : grossly normal strength/tone [No Rash] : no rash [Normal Gait] : normal gait [Coordination Grossly Intact] : coordination grossly intact [No Focal Deficits] : no focal deficits [Speech Grossly Normal] : speech grossly normal [Memory Grossly Normal] : memory grossly normal [Normal Affect] : the affect was normal [Alert and Oriented x3] : oriented to person, place, and time [Normal Mood] : the mood was normal [Normal Insight/Judgement] : insight and judgment were intact

## 2019-02-13 NOTE — HISTORY OF PRESENT ILLNESS
[(Patient denies any chest pain, claudication, dyspnea on exertion, orthopnea, palpitations or syncope)] : Patient denies any chest pain, claudication, dyspnea on exertion, orthopnea, palpitations or syncope [Moderate (4-6 METs)] : Moderate (4-6 METs) [Aortic Stenosis] : no aortic stenosis [Atrial Fibrillation] : no atrial fibrillation [Coronary Artery Disease] : no coronary artery disease [Recent Myocardial Infarction] : no recent myocardial infarction [Implantable Device/Pacemaker] : no implantable device/pacemaker [Asthma] : no asthma [COPD] : no COPD [Sleep Apnea] : no sleep apnea [Smoker] : not a smoker [Family Member] : no family member with adverse anesthesia reaction/sudden death [Self] : no previous adverse anesthesia reaction [Chronic Anticoagulation] : no chronic anticoagulation [Chronic Kidney Disease] : no chronic kidney disease [Diabetes] : no diabetes [FreeTextEntry1] : uro/gyn: pelvic floor  [FreeTextEntry2] : 2/19/19 [FreeTextEntry3] : Bran Watson MD

## 2019-02-13 NOTE — ASSESSMENT
[High Risk Surgery - Intraperitoneal, Intrathoracic or Supringuinal Vascular Procedures] : High Risk Surgery - Intraperitoneal, Intrathoracic or Supringuinal Vascular Procedures - No (0) [Ischemic Heart Disease] : Ischemic Heart Disease - No (0) [Congestive Heart Failure] : Congestive Heart Failure - No (0) [Prior Cerebrovascular Accident or TIA] : Prior Cerebrovascular Accident or TIA - No (0) [Creatinine >= 2mg/dL (1 Point)] : Creatinine >= 2mg/dL - No (0) [Insulin-dependent Diabetic (1 Point)] : Insulin-dependent Diabetic - No (0) [0] : 0 , RCRI Class: I, Risk of Post-Op Cardiac Complications: 0.4%, Procedure Risk: Low-Risk [Patient Optimized for Surgery] : Patient optimized for surgery [Modify medications prior to procedure] : Modify medications prior to procedure [As per surgery] : as per surgery [FreeTextEntry7] : take only levothyroxine and metoprolol morning of surgery with sips of water

## 2019-02-18 ENCOUNTER — TRANSCRIPTION ENCOUNTER (OUTPATIENT)
Age: 59
End: 2019-02-18

## 2019-02-19 ENCOUNTER — RESULT REVIEW (OUTPATIENT)
Age: 59
End: 2019-02-19

## 2019-02-19 ENCOUNTER — APPOINTMENT (OUTPATIENT)
Dept: UROGYNECOLOGY | Facility: HOSPITAL | Age: 59
End: 2019-02-19
Payer: COMMERCIAL

## 2019-02-19 ENCOUNTER — TRANSCRIPTION ENCOUNTER (OUTPATIENT)
Age: 59
End: 2019-02-19

## 2019-02-19 ENCOUNTER — OUTPATIENT (OUTPATIENT)
Dept: OUTPATIENT SERVICES | Facility: HOSPITAL | Age: 59
LOS: 1 days | End: 2019-02-19
Payer: COMMERCIAL

## 2019-02-19 VITALS
HEART RATE: 68 BPM | TEMPERATURE: 98 F | RESPIRATION RATE: 18 BRPM | OXYGEN SATURATION: 98 % | DIASTOLIC BLOOD PRESSURE: 80 MMHG | SYSTOLIC BLOOD PRESSURE: 138 MMHG

## 2019-02-19 VITALS
TEMPERATURE: 98 F | OXYGEN SATURATION: 98 % | SYSTOLIC BLOOD PRESSURE: 133 MMHG | HEART RATE: 60 BPM | HEIGHT: 71 IN | DIASTOLIC BLOOD PRESSURE: 88 MMHG | RESPIRATION RATE: 18 BRPM | WEIGHT: 199.08 LBS

## 2019-02-19 DIAGNOSIS — N81.11 CYSTOCELE, MIDLINE: ICD-10-CM

## 2019-02-19 DIAGNOSIS — Z09 ENCOUNTER FOR FOLLOW-UP EXAMINATION AFTER COMPLETED TREATMENT FOR CONDITIONS OTHER THAN MALIGNANT NEOPLASM: ICD-10-CM

## 2019-02-19 DIAGNOSIS — Z90.710 ACQUIRED ABSENCE OF BOTH CERVIX AND UTERUS: Chronic | ICD-10-CM

## 2019-02-19 DIAGNOSIS — N99.3 PROLAPSE OF VAGINAL VAULT AFTER HYSTERECTOMY: ICD-10-CM

## 2019-02-19 DIAGNOSIS — N39.3 STRESS INCONTINENCE (FEMALE) (MALE): ICD-10-CM

## 2019-02-19 DIAGNOSIS — Z96.642 PRESENCE OF LEFT ARTIFICIAL HIP JOINT: Chronic | ICD-10-CM

## 2019-02-19 DIAGNOSIS — N81.6 RECTOCELE: ICD-10-CM

## 2019-02-19 LAB
HCT VFR BLD CALC: 21.2 % — LOW (ref 34.5–45)
HCT VFR BLD CALC: 40.6 % — SIGNIFICANT CHANGE UP (ref 34.5–45)
HGB BLD-MCNC: 14 G/DL — SIGNIFICANT CHANGE UP (ref 11.5–15.5)
HGB BLD-MCNC: 7.2 G/DL — LOW (ref 11.5–15.5)
MCHC RBC-ENTMCNC: 32.5 PG — SIGNIFICANT CHANGE UP (ref 27–34)
MCHC RBC-ENTMCNC: 34 GM/DL — SIGNIFICANT CHANGE UP (ref 32–36)
MCV RBC AUTO: 95.7 FL — SIGNIFICANT CHANGE UP (ref 80–100)
PLATELET # BLD AUTO: 105 K/UL — LOW (ref 150–400)
RBC # BLD: 2.21 M/UL — LOW (ref 3.8–5.2)
RBC # FLD: 12.4 % — SIGNIFICANT CHANGE UP (ref 10.3–14.5)
RH IG SCN BLD-IMP: POSITIVE — SIGNIFICANT CHANGE UP
WBC # BLD: 4.4 K/UL — SIGNIFICANT CHANGE UP (ref 3.8–10.5)
WBC # FLD AUTO: 4.4 K/UL — SIGNIFICANT CHANGE UP (ref 3.8–10.5)

## 2019-02-19 PROCEDURE — 88302 TISSUE EXAM BY PATHOLOGIST: CPT | Mod: 26

## 2019-02-19 PROCEDURE — 86901 BLOOD TYPING SEROLOGIC RH(D): CPT

## 2019-02-19 PROCEDURE — 57425 LAPAROSCOPY SURG COLPOPEXY: CPT

## 2019-02-19 PROCEDURE — 86900 BLOOD TYPING SEROLOGIC ABO: CPT

## 2019-02-19 PROCEDURE — C1781: CPT

## 2019-02-19 PROCEDURE — 85018 HEMOGLOBIN: CPT

## 2019-02-19 PROCEDURE — 58661 LAPAROSCOPY REMOVE ADNEXA: CPT | Mod: RT

## 2019-02-19 PROCEDURE — 57288 REPAIR BLADDER DEFECT: CPT

## 2019-02-19 PROCEDURE — 58661 LAPAROSCOPY REMOVE ADNEXA: CPT

## 2019-02-19 PROCEDURE — 88302 TISSUE EXAM BY PATHOLOGIST: CPT

## 2019-02-19 PROCEDURE — 85014 HEMATOCRIT: CPT

## 2019-02-19 PROCEDURE — S2900: CPT

## 2019-02-19 PROCEDURE — C1771: CPT

## 2019-02-19 PROCEDURE — 85027 COMPLETE CBC AUTOMATED: CPT

## 2019-02-19 RX ORDER — LEVOTHYROXINE SODIUM 125 MCG
150 TABLET ORAL DAILY
Qty: 0 | Refills: 0 | Status: DISCONTINUED | OUTPATIENT
Start: 2019-02-19 | End: 2019-03-06

## 2019-02-19 RX ORDER — INFLUENZA VIRUS VACCINE 15; 15; 15; 15 UG/.5ML; UG/.5ML; UG/.5ML; UG/.5ML
0.5 SUSPENSION INTRAMUSCULAR ONCE
Qty: 0 | Refills: 0 | Status: DISCONTINUED | OUTPATIENT
Start: 2019-02-19 | End: 2019-03-06

## 2019-02-19 RX ORDER — SODIUM CHLORIDE 9 MG/ML
1000 INJECTION, SOLUTION INTRAVENOUS
Qty: 0 | Refills: 0 | Status: DISCONTINUED | OUTPATIENT
Start: 2019-02-19 | End: 2019-03-06

## 2019-02-19 RX ORDER — IBUPROFEN 200 MG
1 TABLET ORAL
Qty: 9 | Refills: 0
Start: 2019-02-19 | End: 2019-02-21

## 2019-02-19 RX ORDER — ACETAMINOPHEN 500 MG
975 TABLET ORAL ONCE
Qty: 0 | Refills: 0 | Status: COMPLETED | OUTPATIENT
Start: 2019-02-19 | End: 2019-02-19

## 2019-02-19 RX ORDER — ACETAMINOPHEN 500 MG
975 TABLET ORAL EVERY 6 HOURS
Qty: 0 | Refills: 0 | Status: DISCONTINUED | OUTPATIENT
Start: 2019-02-19 | End: 2019-03-06

## 2019-02-19 RX ORDER — DOCUSATE SODIUM 100 MG
100 CAPSULE ORAL THREE TIMES A DAY
Qty: 0 | Refills: 0 | Status: DISCONTINUED | OUTPATIENT
Start: 2019-02-19 | End: 2019-03-06

## 2019-02-19 RX ORDER — SODIUM CHLORIDE 9 MG/ML
3 INJECTION INTRAMUSCULAR; INTRAVENOUS; SUBCUTANEOUS EVERY 8 HOURS
Qty: 0 | Refills: 0 | Status: DISCONTINUED | OUTPATIENT
Start: 2019-02-19 | End: 2019-02-19

## 2019-02-19 RX ORDER — OXYCODONE HYDROCHLORIDE 5 MG/1
1 TABLET ORAL
Qty: 8 | Refills: 0
Start: 2019-02-19 | End: 2019-02-20

## 2019-02-19 RX ORDER — KETOROLAC TROMETHAMINE 30 MG/ML
15 SYRINGE (ML) INJECTION EVERY 6 HOURS
Qty: 0 | Refills: 0 | Status: COMPLETED | OUTPATIENT
Start: 2019-02-19 | End: 2019-02-20

## 2019-02-19 RX ORDER — METOPROLOL TARTRATE 50 MG
5 TABLET ORAL EVERY 6 HOURS
Qty: 0 | Refills: 0 | Status: DISCONTINUED | OUTPATIENT
Start: 2019-02-19 | End: 2019-03-06

## 2019-02-19 RX ORDER — DOCUSATE SODIUM 100 MG
1 CAPSULE ORAL
Qty: 0 | Refills: 0 | DISCHARGE
Start: 2019-02-19

## 2019-02-19 RX ORDER — METOPROLOL TARTRATE 50 MG
50 TABLET ORAL DAILY
Qty: 0 | Refills: 0 | Status: DISCONTINUED | OUTPATIENT
Start: 2019-02-20 | End: 2019-03-06

## 2019-02-19 RX ORDER — ACETAMINOPHEN 500 MG
3 TABLET ORAL
Qty: 0 | Refills: 0 | DISCHARGE
Start: 2019-02-19

## 2019-02-19 RX ORDER — ONDANSETRON 8 MG/1
4 TABLET, FILM COATED ORAL ONCE
Qty: 0 | Refills: 0 | Status: DISCONTINUED | OUTPATIENT
Start: 2019-02-19 | End: 2019-03-06

## 2019-02-19 RX ORDER — OXYCODONE HYDROCHLORIDE 5 MG/1
5 TABLET ORAL EVERY 4 HOURS
Qty: 0 | Refills: 0 | Status: DISCONTINUED | OUTPATIENT
Start: 2019-02-19 | End: 2019-02-19

## 2019-02-19 RX ORDER — GABAPENTIN 400 MG/1
300 CAPSULE ORAL AT BEDTIME
Qty: 0 | Refills: 0 | Status: DISCONTINUED | OUTPATIENT
Start: 2019-02-19 | End: 2019-03-06

## 2019-02-19 RX ORDER — FAMOTIDINE 10 MG/ML
20 INJECTION INTRAVENOUS ONCE
Qty: 0 | Refills: 0 | Status: COMPLETED | OUTPATIENT
Start: 2019-02-19 | End: 2019-02-19

## 2019-02-19 RX ADMIN — FAMOTIDINE 20 MILLIGRAM(S): 10 INJECTION INTRAVENOUS at 07:56

## 2019-02-19 RX ADMIN — SODIUM CHLORIDE 75 MILLILITER(S): 9 INJECTION, SOLUTION INTRAVENOUS at 15:17

## 2019-02-19 RX ADMIN — SODIUM CHLORIDE 3 MILLILITER(S): 9 INJECTION INTRAMUSCULAR; INTRAVENOUS; SUBCUTANEOUS at 07:58

## 2019-02-19 RX ADMIN — Medication 975 MILLIGRAM(S): at 07:56

## 2019-02-19 NOTE — PROGRESS NOTE ADULT - SUBJECTIVE AND OBJECTIVE BOX
POST-OP CHECK  PA Note    Allergies    clindamycin (Rash)  strawberry (Rash)  sulfa drugs (Unknown)  Sulfur (Rash)    Intolerances        S: Pt awake and alert resting comfortaby in bed   Pain controlled. Pt denies N/V, SOB, CP, palpitations. neg  Flatus  leon in place    O:   T(C): 36.3 (02-19-19 @ 13:30), Max: 36.3 (02-19-19 @ 13:30)  HR: 53 (02-19-19 @ 14:00) (53 - 58)  BP: 128/83 (02-19-19 @ 14:00) (112/70 - 130/80)  RR: 17 (02-19-19 @ 14:00) (13 - 20)  SpO2: 98% (02-19-19 @ 14:00) (95% - 99%)  Wt(kg): --  I&O's Summary    19 Feb 2019 07:01  -  19 Feb 2019 14:22  --------------------------------------------------------  IN: 150 mL / OUT: 400 mL / NET: -250 mL                         OR           PACU  (I)                              IVF LR@125  (O)  EBL    CV: RRR  Lungs: CTA B/L  Abd: minimal +BS, soft, non tender  Inc: Clean/dry/intact x7  Ext: SCDs in place, Neg Homans B/L

## 2019-02-19 NOTE — CHART NOTE - NSCHARTNOTEFT_GEN_A_CORE
TOV Note    Active Trial of Void performed.   300cc NS instilled into the bladder by gravity.  Fall D/C'd  Pt voided  400  cc   Fall catheter  to remain out.            Elis Cox PA-C

## 2019-02-19 NOTE — DISCHARGE NOTE ADULT - MEDICATION SUMMARY - MEDICATIONS TO TAKE
I will START or STAY ON the medications listed below when I get home from the hospital:    acetaminophen 325 mg oral tablet  -- 3 tab(s) by mouth every 6 hours  -- Indication: For Pain    oxyCODONE 5 mg oral tablet  -- 1 tab(s) by mouth every 6 hours, As Needed -Severe Pain (7 - 10) MDD:4  -- Indication: For Pain    ibuprofen 800 mg oral tablet  -- 1 tab(s) by mouth every 8 hours   -- Do not take this drug if you are pregnant.  It is very important that you take or use this exactly as directed.  Do not skip doses or discontinue unless directed by your doctor.  May cause drowsiness or dizziness.  Obtain medical advice before taking any non-prescription drugs as some may affect the action of this medication.  Take with food or milk.    -- Indication: For Pain    gabapentin 300 mg oral capsule  -- 1 cap(s) by mouth once a day (at bedtime)  -- Indication: For home med    pravastatin 40 mg oral tablet  -- 1 tab(s) by mouth once a day  -- Indication: For home med    metoprolol succinate 50 mg oral tablet, extended release  -- 1 tab(s) by mouth once a day  -- Indication: For home med    docusate sodium 100 mg oral capsule  -- 1 cap(s) by mouth 3 times a day, As needed, Stool Softening  -- Indication: For constipation    levothyroxine 150 mcg (0.15 mg) oral tablet  -- 1 tab(s) by mouth once a day  -- Indication: For home med    Vitamin D3 400 intl units oral capsule  -- 1 cap(s) by mouth once a day  -- Indication: For homed med

## 2019-02-19 NOTE — DISCHARGE NOTE ADULT - CARE PLAN
Principal Discharge DX:	Stress incontinence in female  Goal:	return to normal state of health  Assessment and plan of treatment:	Follow up with Dr. Watson in 2 weeks for a post op check. Nothing in the vagina-no tampons, douching, or sex. Call with uncontrolled abdominal pain, fevers, difficulty urinating, vomiting, or any other concerns.  Secondary Diagnosis:	Prolapse of vaginal vault after hysterectomy

## 2019-02-19 NOTE — BRIEF OPERATIVE NOTE - OPERATION/FINDINGS
adhesions of right fallopian tube to vaginal cuff. normal postmenopausal ovaries. successful sling with normal cysto after placement

## 2019-02-19 NOTE — DISCHARGE NOTE ADULT - PLAN OF CARE
return to normal state of health Follow up with Dr. Watson in 2 weeks for a post op check. Nothing in the vagina-no tampons, douching, or sex. Call with uncontrolled abdominal pain, fevers, difficulty urinating, vomiting, or any other concerns.

## 2019-02-19 NOTE — DISCHARGE NOTE ADULT - CARE PROVIDER_API CALL
Bran Watson (MD)  Female Pelvic MedReconst Surg; Obstetrics and Gynecology  [address]  [place] [address]  Phone: [phone]  Fax: [phone]  Follow Up Time:

## 2019-02-19 NOTE — BRIEF OPERATIVE NOTE - PROCEDURE
<<-----Click on this checkbox to enter Procedure Retropubic sling procedure using transvaginal tape for female stress incontinence with cystoscopy  02/19/2019    Active  LSTORK  Right salpingectomy  02/19/2019    Active  LSTORK  Lysis of adhesions  02/19/2019    Active  LSTORK  Sacrocolpopexy, abdomen, robot-assisted, laparoscopic  02/19/2019    Active  LSTORK

## 2019-02-19 NOTE — PROGRESS NOTE ADULT - ASSESSMENT
A/P: 58y Female S/P robotic assisted sacrocolpopexy right salpingectomy, lysis of adhesions, midurethral sling, cystoscopy   PAST MEDICAL & SURGICAL HISTORY:  Rectocele  Stress incontinence in female: Cystocele ,Midline  Prolapse of vaginal vault after hysterectomy  Cervical disc disease: herniated discs; unsure levels (asymptomatic)  Diverticulosis: asymptomatic  Esophageal spasm: endoscopy and colonoscopy negative; no reflux  Bulging lumbar disc: L4-5; treated with PINA (last in June 2013)  Cluster headache  Hyperlipemia  HTN (hypertension)  Hypothyroid  Osteoarthritis  History of total left hip replacement: 2014  Normal vaginal delivery: x 3  S/P hysterectomy: 2004 after prolapse

## 2019-02-19 NOTE — BRIEF OPERATIVE NOTE - PRE-OP DX
Stress incontinence in female  02/19/2019    Tianna John  Vaginal vault prolapse after hysterectomy  02/19/2019    Tianna John

## 2019-02-19 NOTE — DISCHARGE NOTE ADULT - HOSPITAL COURSE
Pt underwent robotic-assisted laparoscopic abdominal sacrocolpopexy, LEVON, right salpingectomy, retropubic MUS, cysto on 2/19. She met all milestones postoperatively and passed a voiding trial in the PACU. She was stable for discharge from PACU.   CBC in PACU initially resulted with Hct of 21 (from baseline 42) which was thought to be an error and was repeat and found to be ___ Pt underwent robotic-assisted laparoscopic abdominal sacrocolpopexy, LEVON, right salpingectomy, retropubic MUS, cysto on 2/19. She met all milestones postoperatively and passed a voiding trial in the PACU. She was stable for discharge from PACU.   CBC in PACU initially resulted with Hct of 21 (from baseline 42) which was thought to be an error and was repeat and found to be ___  Trial of void performed and patient voided 400cc leon left out.

## 2019-02-19 NOTE — PROGRESS NOTE ADULT - PROBLEM SELECTOR PLAN 1
-Neuro - AAO x 3, Pain well controlled with  -Heme - hemodynamically stable  -CV - asymptomatic, CBC at 4pm  -Pulm - encourage IS, O2sat   -GI - Diet-  Regular Advance as Toelrated  - - Fall to gravity   -DVT prophylaxis - SCDs in place, encourage ambulation  -Meds:  acetaminophen   Tablet .. 975 milliGRAM(s) Oral every 6 hours  cefoTEtan  IVPB 2 Gram(s) IV Intermittent once  docusate sodium 100 milliGRAM(s) Oral three times a day PRN  gabapentin 300 milliGRAM(s) Oral at bedtime  ketorolac   Injectable 15 milliGRAM(s) IV Push every 6 hours  lactated ringers. 1000 milliLiter(s) IV Continuous <Continuous>  levothyroxine 150 MICROGram(s) Oral daily  lidocaine 1% Injectable 0.2 milliLiter(s) Local Injection once  metoprolol tartrate Injectable 5 milliGRAM(s) IV Push every 6 hours PRN  ondansetron Injectable 4 milliGRAM(s) IV Push once PRN  oxyCODONE    IR 5 milliGRAM(s) Oral every 4 hours PRN  -Dispo: stable for discharge from PACU, once meeting all PACU milestones

## 2019-02-19 NOTE — CHART NOTE - NSCHARTNOTEFT_GEN_A_CORE
R3 GYN    Pt seen and examined with Dr. Sanford in PACU. She is OOB, ambulating, pain controlled, and tolerating regular diet. Hct on afternoon CBC noted to be 21 (baseline 42). Given reassuring clinical status, normal stable VS, and minimal blood loss intraoperatively this is likely an error. Will perform TOV and repeat H+H. If stable can be evaluated for DC.    Vital Signs Last 24 Hrs  T(C): 36.3 (19 Feb 2019 13:30), Max: 36.5 (19 Feb 2019 06:50)  T(F): 97.3 (19 Feb 2019 13:30), Max: 97.7 (19 Feb 2019 06:50)  HR: 69 (19 Feb 2019 16:00) (53 - 69)  BP: 143/83 (19 Feb 2019 16:00) (112/70 - 143/83)  BP(mean): 107 (19 Feb 2019 16:00) (87 - 107)  RR: 18 (19 Feb 2019 16:00) (13 - 20)  SpO2: 98% (19 Feb 2019 16:00) (95% - 99%)    d/w Dr. Claribel MacielGerardmin PGY3 [id number]

## 2019-02-19 NOTE — DISCHARGE NOTE ADULT - PATIENT PORTAL LINK FT
You can access the [facility] Patient Portal, offered by [facility], by registering with the following website: [email or web address]

## 2019-02-26 LAB — SURGICAL PATHOLOGY STUDY: SIGNIFICANT CHANGE UP

## 2019-02-28 ENCOUNTER — TRANSCRIPTION ENCOUNTER (OUTPATIENT)
Age: 59
End: 2019-02-28

## 2019-03-08 ENCOUNTER — APPOINTMENT (OUTPATIENT)
Dept: UROGYNECOLOGY | Facility: CLINIC | Age: 59
End: 2019-03-08
Payer: COMMERCIAL

## 2019-03-08 PROCEDURE — 99024 POSTOP FOLLOW-UP VISIT: CPT

## 2019-03-08 NOTE — SUBJECTIVE
[FreeTextEntry1] : WN/WD, NAD, A&Ox3, Pt is happy with surgery results [FreeTextEntry8] : Motrin PRN-last dose 1 week ago [FreeTextEntry7] : Occasional lower pelvic cramping-tolerable [FreeTextEntry6] : Denies any c/o's [FreeTextEntry5] : Denies any discomforts, denies JUNIOR w/ cough, sneeze, laughing, feels empty after voids [FreeTextEntry4] : Denies constipation, regular daily soft BM's [FreeTextEntry3] : Denies any c/o's, nl gait [FreeTextEntry2] : Denies any c/o's

## 2019-03-08 NOTE — OBJECTIVE
[Voiding Trial] : Voiding trial was performed [Post Void Residual ____ ml] : Post Void Residual was [unfilled] ml [Soft and Nontender] : soft and nontender [Clean, Dry, Intact] : Clean, Dry, Intact [Good Support] : Good support [Healing well] : healing well [No Masses or Tenderness] : no masses or tenderness [FreeTextEntry2] : Lap sites healing well [FreeTextEntry3] : anterior sutures intact, no bleeding, +scant mucinous d/c in vault

## 2019-03-08 NOTE — DISCUSSION/SUMMARY
[Post-Op instructions given. Pt/family verbalizes understanding] : post-operative instructions were provided to the patient/family who verbalize understanding [Risks/Benefits discussed. Pt/family verbalizes understanding] : risks and benefits of the procedure were discussed with the patient/family who verbalize understanding [FreeTextEntry1] : 3 weeks Post op exam Benign- healing well\par x4 weeks POA\par Reinforced avoid heavy lifting, strenuous exercises, inserting anything vaginally\par Instructed pt to call the office if any problems or concerns and she verbalized understanding.\par

## 2019-04-08 ENCOUNTER — APPOINTMENT (OUTPATIENT)
Dept: UROGYNECOLOGY | Facility: CLINIC | Age: 59
End: 2019-04-08
Payer: COMMERCIAL

## 2019-04-08 DIAGNOSIS — N99.3 PROLAPSE OF VAGINAL VAULT AFTER HYSTERECTOMY: ICD-10-CM

## 2019-04-08 DIAGNOSIS — N39.3 STRESS INCONTINENCE (FEMALE) (MALE): ICD-10-CM

## 2019-04-08 PROCEDURE — 99024 POSTOP FOLLOW-UP VISIT: CPT

## 2019-04-08 NOTE — DISCUSSION/SUMMARY
[Post-Op instructions given. Pt/family verbalizes understanding] : post-operative instructions were provided to the patient/family who verbalize understanding [FreeTextEntry1] : Activity clearance discussed.  Advised to resume care with primary GYN and f/u here on as-needed basis.  Instructed to call with any questions or concerns and she verbalizes understanding.\par pt very happy with surgery

## 2019-04-08 NOTE — OBJECTIVE
[Soft and Nontender] : soft and nontender [Clean, Dry, Intact] : Clean, Dry, Intact [Good Support] : Good support [Healing well] : healing well [No Masses or Tenderness] : no masses or tenderness [FreeTextEntry3] : no evidence of mesh erosion noted

## 2019-04-08 NOTE — SUBJECTIVE
[FreeTextEntry1] : overall doing very well and she is very happy with surgery [FreeTextEntry8] : none new [FreeTextEntry7] : none [FreeTextEntry6] : normal [FreeTextEntry5] : denies any dysuria, incomplete emptying or JUNIOR [FreeTextEntry4] : normal, diet controlled [FreeTextEntry3] : normal [FreeTextEntry2] : normal

## 2019-05-07 ENCOUNTER — RX RENEWAL (OUTPATIENT)
Age: 59
End: 2019-05-07

## 2019-06-17 ENCOUNTER — MOBILE ON CALL (OUTPATIENT)
Age: 59
End: 2019-06-17

## 2019-06-18 ENCOUNTER — MEDICATION RENEWAL (OUTPATIENT)
Age: 59
End: 2019-06-18

## 2019-07-11 ENCOUNTER — RX RENEWAL (OUTPATIENT)
Age: 59
End: 2019-07-11

## 2019-08-23 ENCOUNTER — APPOINTMENT (OUTPATIENT)
Dept: FAMILY MEDICINE | Facility: CLINIC | Age: 59
End: 2019-08-23
Payer: COMMERCIAL

## 2019-08-23 VITALS
DIASTOLIC BLOOD PRESSURE: 104 MMHG | OXYGEN SATURATION: 98 % | HEIGHT: 70 IN | WEIGHT: 206.25 LBS | RESPIRATION RATE: 16 BRPM | BODY MASS INDEX: 29.53 KG/M2 | SYSTOLIC BLOOD PRESSURE: 136 MMHG | HEART RATE: 62 BPM

## 2019-08-23 DIAGNOSIS — M67.40 GANGLION, UNSPECIFIED SITE: ICD-10-CM

## 2019-08-23 PROCEDURE — 99396 PREV VISIT EST AGE 40-64: CPT

## 2019-08-28 ENCOUNTER — APPOINTMENT (OUTPATIENT)
Dept: FAMILY MEDICINE | Facility: CLINIC | Age: 59
End: 2019-08-28

## 2019-09-03 NOTE — PHYSICAL EXAM
[No Acute Distress] : no acute distress [Well Nourished] : well nourished [Well Developed] : well developed [Normal Voice/Communication] : normal voice/communication [Well-Appearing] : well-appearing [Normal Sclera/Conjunctiva] : normal sclera/conjunctiva [PERRL] : pupils equal round and reactive to light [EOMI] : extraocular movements intact [Normal Outer Ear/Nose] : the outer ears and nose were normal in appearance [Normal TMs] : both tympanic membranes were normal [Normal Oropharynx] : the oropharynx was normal [No Lymphadenopathy] : no lymphadenopathy [No JVD] : no jugular venous distention [Thyroid Normal, No Nodules] : the thyroid was normal and there were no nodules present [No Respiratory Distress] : no respiratory distress  [Supple] : supple [No Accessory Muscle Use] : no accessory muscle use [Clear to Auscultation] : lungs were clear to auscultation bilaterally [Normal Rate] : normal rate  [Normal S1, S2] : normal S1 and S2 [Regular Rhythm] : with a regular rhythm [Pedal Pulses Present] : the pedal pulses are present [No Extremity Clubbing/Cyanosis] : no extremity clubbing/cyanosis [No Edema] : there was no peripheral edema [Non Tender] : non-tender [Soft] : abdomen soft [Non-distended] : non-distended [No Masses] : no abdominal mass palpated [No HSM] : no HSM [Normal Supraclavicular Nodes] : no supraclavicular lymphadenopathy [No CVA Tenderness] : no CVA  tenderness [Normal Anterior Cervical Nodes] : no anterior cervical lymphadenopathy [No Joint Swelling] : no joint swelling [No Spinal Tenderness] : no spinal tenderness [Grossly Normal Strength/Tone] : grossly normal strength/tone [No Rash] : no rash [No Focal Deficits] : no focal deficits [Normal Gait] : normal gait [Coordination Grossly Intact] : coordination grossly intact [Speech Grossly Normal] : speech grossly normal [Normal Affect] : the affect was normal [Memory Grossly Normal] : memory grossly normal [Normal Mood] : the mood was normal [Alert and Oriented x3] : oriented to person, place, and time [Normal Insight/Judgement] : insight and judgment were intact [de-identified] : +ganglion cyst

## 2019-09-03 NOTE — REVIEW OF SYSTEMS
[Fatigue] : fatigue [Redness] : redness [Vision Problems] : vision problems [Nocturia] : nocturia [Joint Stiffness] : joint stiffness [Headache] : headache [Insomnia] : insomnia [Anxiety] : anxiety [Fever] : no fever [Hot Flashes] : no hot flashes [Chills] : no chills [Recent Change In Weight] : ~T no recent weight change [Night Sweats] : no night sweats [Discharge] : no discharge [Pain] : no pain [Dryness] : no dryness  [Itching] : no itching [Hearing Loss] : no hearing loss [Earache] : no earache [Nosebleed] : no nosebleeds [Hoarseness] : no hoarseness [Sore Throat] : no sore throat [Nasal Discharge] : no nasal discharge [Postnasal Drip] : no postnasal drip [Chest Pain] : no chest pain [Palpitations] : no palpitations [Leg Claudication] : no leg claudication [Lower Ext Edema] : no lower extremity edema [Orthopnea] : no orthopnea [Paroysmal Nocturnal Dyspnea] : no paroysmal nocturnal dyspnea [Wheezing] : no wheezing [Shortness Of Breath] : no shortness of breath [Dyspnea on Exertion] : no dyspnea on exertion [Cough] : no cough [Abdominal Pain] : no abdominal pain [Nausea] : no nausea [Constipation] : no constipation [Diarrhea] : diarrhea [Vomiting] : no vomiting [Melena] : no melena [Heartburn] : no heartburn [Incontinence] : no incontinence [Dysuria] : no dysuria [Poor Libido] : libido not poor [Hematuria] : no hematuria [Frequency] : no frequency [Joint Pain] : no joint pain [Joint Swelling] : no joint swelling [Vaginal Discharge] : no vaginal discharge [Muscle Weakness] : no muscle weakness [Muscle Pain] : no muscle pain [Back Pain] : no back pain [Nail Changes] : no nail changes [Mole Changes] : no mole changes [Hair Changes] : no hair changes [Skin Rash] : no skin rash [Dizziness] : no dizziness [Fainting] : no fainting [Confusion] : no confusion [Memory Loss] : no memory loss [Depression] : no depression [Suicidal] : not suicidal [Easy Bleeding] : no easy bleeding [Easy Bruising] : no easy bruising [Swollen Glands] : no swollen glands [FreeTextEntry3] : ophtho: last visit this year, 'blood shot' due to lack of sleep, eye glasses [FreeTextEntry2] : gained some weight with mom's illness [FreeTextEntry4] : +tinnitus [FreeTextEntry6] : kayack, swim but not as much as usual [FreeTextEntry7] : 6 years ago had last colonoscopy, normal [FreeTextEntry8] : nocturia/insomnia. no postmenopausal bleeiding [FreeTextEntry9] : in morning or with prolonged sitting [de-identified] : migrane headaches better since menopause, one per 3 months

## 2019-09-03 NOTE — HISTORY OF PRESENT ILLNESS
[FreeTextEntry1] : Presents for CPE, feels well.  Her mother is sick in hospital; 85 yo, mom went down hill since fell in May. Sleep is not good, tired. Feels well after surgrey for vaginal prolapse, much better. No headaches, blurry vision or epistaxis reported. \par \par mammogram normal 2 months ago

## 2019-09-26 DIAGNOSIS — Z01.818 ENCOUNTER FOR OTHER PREPROCEDURAL EXAMINATION: ICD-10-CM

## 2019-11-27 ENCOUNTER — RX RENEWAL (OUTPATIENT)
Age: 59
End: 2019-11-27

## 2019-12-17 ENCOUNTER — RX RENEWAL (OUTPATIENT)
Age: 59
End: 2019-12-17

## 2020-02-28 ENCOUNTER — APPOINTMENT (OUTPATIENT)
Dept: FAMILY MEDICINE | Facility: CLINIC | Age: 60
End: 2020-02-28
Payer: COMMERCIAL

## 2020-02-28 VITALS
OXYGEN SATURATION: 98 % | HEIGHT: 70 IN | HEART RATE: 60 BPM | DIASTOLIC BLOOD PRESSURE: 92 MMHG | SYSTOLIC BLOOD PRESSURE: 122 MMHG | WEIGHT: 208.19 LBS | BODY MASS INDEX: 29.8 KG/M2 | RESPIRATION RATE: 16 BRPM

## 2020-02-28 DIAGNOSIS — G43.909 MIGRAINE, UNSPECIFIED, NOT INTRACTABLE, W/OUT STATUS MIGRAINOSUS: ICD-10-CM

## 2020-02-28 DIAGNOSIS — T78.40XA ALLERGY, UNSPECIFIED, INITIAL ENCOUNTER: ICD-10-CM

## 2020-02-28 PROCEDURE — 99214 OFFICE O/P EST MOD 30 MIN: CPT

## 2020-02-28 NOTE — ASSESSMENT
[FreeTextEntry1] : use epipen if you feel your throat is closing, you can't breath\par RTO 3 months for CPE

## 2020-02-28 NOTE — PHYSICAL EXAM
[Well Developed] : well developed [Well Nourished] : well nourished [No Acute Distress] : no acute distress [Normal Sclera/Conjunctiva] : normal sclera/conjunctiva [Well-Appearing] : well-appearing [Normal Voice/Communication] : normal voice/communication [Normal Oropharynx] : the oropharynx was normal [Normal Outer Ear/Nose] : the outer ears and nose were normal in appearance [No Lymphadenopathy] : no lymphadenopathy [No JVD] : no jugular venous distention [Supple] : supple [No Respiratory Distress] : no respiratory distress  [No Accessory Muscle Use] : no accessory muscle use [Normal Rate] : normal rate  [Clear to Auscultation] : lungs were clear to auscultation bilaterally [Normal S1, S2] : normal S1 and S2 [No Edema] : there was no peripheral edema [Regular Rhythm] : with a regular rhythm

## 2020-02-28 NOTE — HISTORY OF PRESENT ILLNESS
[FreeTextEntry8] : Presents with  and 3 yo granddaughter for med renewal.  +insomnia and migrane medication needed. Feeling well, very busy with caring for grandchildren. Also mother is in nursing home and having health problems. Had episode of 'throat closing' after eating turkey, unsure what she is allergic to in sliced turkey, ?perservative. \par \par ROS: negative except as noted above\par

## 2020-03-30 ENCOUNTER — RX RENEWAL (OUTPATIENT)
Age: 60
End: 2020-03-30

## 2020-08-02 NOTE — BRIEF OPERATIVE NOTE - TYPE OF ANESTHESIA
Normal exercise performance without angina and ischemic EKG changes.    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    Hyperlipemia     Irregular heart beat     per pt    Liver hematoma     Migraines     Last migraine:  2018    Nausea & vomiting     Schizophrenia ([address]     per old chart    Seizures ([address]     \"last one was 9/2015- saw Dr Janie Jasso at [facility]- she said not sure if acutal seizures- she thinks they are panic or anxiety attacks\"    SOB (shortness of breath)     with any exertion     Past Surgical History:   Procedure Laterality Date    BREAST SURGERY  10/2015    left breast bx    CARDIAC CATHETERIZATION      per old chart pt had cath done in 3/2011 and 9/2013    CARPAL TUNNEL RELEASE Left 1/9/2020    CARPAL TUNNEL RELEASE LEFT performed by Payam Ortiz DO at [facility], [address] Right 05/26/2020    dr Sabrina Hua, carpal tunnel trigger finger release    CARPAL TUNNEL RELEASE Right 5/26/2020    RIGHT CARPAL TUNNEL RELEASE performed by Payam Ortiz DO at [id number]  per old chart done [facility]  3/11/13    diverticulosis, cecal polyp    COLONOSCOPY  03/16/2017    Internal hemorrhoids- Dr. Nghia Lowry, COLON, DIAGNOSTIC  03/16/2017    EGD: Small esophageal varices, portal hypertensive gastropathy, reflux esophagitis, hiatal hernia    EYE SURGERY Bilateral ? when    cyst removal, cataracts w/lens replacement    FINGER TRIGGER RELEASE Right 5/26/2020    FINGER TRIGGER RELEASE RIGHT RING FINGER performed by Payam Ortiz DO at [facility]      per old chart pt had CHOLO/BSO 1986    TIPS PROCEDURE  04/23/2019    TONSILLECTOMY  as a kid    UPPER GASTROINTESTINAL ENDOSCOPY N/A 11/14/2018    EGD DIAGNOSTIC ONLY performed by Charley Scheuermann, MD at [address] 6/5/2019    EGD  None   Social History Narrative    None       MEDICATIONS   Medications Prior to Admission  Not in a hospital admission. Current Medications  Current Facility-Administered Medications   Medication Dose Route Frequency Provider Last Rate Last Dose    0.9 % sodium chloride bolus  1,000 mL Intravenous Once Isra Mazariegos DO 1,000 mL/hr at 08/02/20 1800 1,000 mL at 08/02/20 1800    lactulose (CHRONULAC) 10 GM/15ML solution 20 g  20 g Oral 6 times per day Isra Jung Pillion, DO         Current Outpatient Medications   Medication Sig Dispense Refill    midodrine (PROAMATINE) 10 MG tablet Take 1 tablet by mouth 3 times daily (with meals) 90 tablet 3    nitroGLYCERIN (NITROSTAT) 0.4 MG SL tablet Place 1 tablet under the tongue every 5 minutes as needed for Chest pain 25 tablet 3    ranolazine (RANEXA) 500 MG extended release tablet Take 1 tablet by mouth 2 times daily 60 tablet 5    Compression Stockings MISC by Does not apply route 20 - 30 mmh wear daily and take off at night  Thigh High 2 each 2    metoprolol succinate (TOPROL XL) 50 MG extended release tablet Take 1 tablet by mouth daily 30 tablet 3    insulin glargine (LANTUS SOLOSTAR) 100 UNIT/ML injection pen Inject 20 Units into the skin nightly 5 pen 3    insulin aspart (NOVOLOG FLEXPEN) 100 UNIT/ML injection pen **Low Dose Correction Algorithm**Insulin lispro (HUMALOG)  3 TIMES DAILY WITH MEALSGlucose: Dose: No [id number] 1 [id number] 2 [id number] 3 [id number] 4 [id number] 5 [id number] and above 6 Units 5 pen 3    blood glucose monitor strips Test  Bid times a day & as needed for symptoms of irregular blood glucose.  100 strip 5    UNABLE TO FIND Rx for depends   Use 3-4  times daily 1 box 5    albuterol (PROVENTIL) (2.5 MG/3ML) 0.083% nebulizer solution Take 3 mLs by nebulization every 6 hours 120 vial 5    lactulose (CHRONULAC) 10 GM/15ML solution Take 30 mLs by mouth 3 times daily 1892 mL 2    Glucosource Lancets MISC Use one 3-4 lids intact. No scleral icterus  ENT - Lips wnl. External ear clear/dry/intact. No thyromegaly on inspection  Neuro - No gross peripheral or central neuro deficits on inspection  Heart - Sinus. RRR. S1 and S2 present. No added HS/murmurs appreciated. No elevated JVD appreciated  Lung - Adequate air entry b/l, No crackles/wheezes appreciated  GI - Soft. No guarding/rigidity. No hepatosplenomegaly/ascites. BS+   - No CVA/suprapubic tenderness or palpable bladder distension  Skin - Intact. No rash/petechiae/ecchymosis. Warm extremities  MSK - Joints with normal ROM. No joint swellings    Lines/Drains/Airways/Wounds:  [unfilled]    LABS AND IMAGING   CBC  [unfilled]    Last 3 Hemoglobin  Lab Results   Component Value Date    HGB 13.6 08/02/2020    HGB 11.8 06/05/2020    HGB 11.7 06/05/2020     Last 3 WBC/ANC  Lab Results   Component Value Date    WBC 6.8 08/02/2020    WBC 4.3 06/05/2020    WBC 4.8 06/05/2020     No components found for: GRNLOCTYABS  Last 3 Platelets  No results found for: PLATELET  Chemistry  [unfilled]  [unfilled]  Lab Results   Component Value Date     05/21/2020     01/24/2020     04/29/2013     Coagulation Studies  Lab Results   Component Value Date    INR 1.11 08/02/2020     Liver Function Studies  Lab Results   Component Value Date    ALT 13 08/02/2020    AST 19 08/02/2020    ALKPHOS 108 08/02/2020       Recent Imaging        Relevant labs and imaging reviewed    ASSESSMENT AND PLAN   Leonard Nesbitt is a 61 y.o.  female  who presents with AMS    - Hepatic encephalopathy  Ammonia: Elevated  Patient is able to answer questions appropriately; by report is intermittently confused  Patient received lactulose in the ER- continue TID  Recheck ammonia in a.m.   Continue rifaximin 550 bid  UA pending    QTC elongation  -Avoid QTC prolonging drugs  -Telemetry  -Add on magnesium level  -Denies chest pain or palpitations    CKD  Creat 1.2 (baseline 1.1)  Received IVF in ED  Holding lasix and spironolactone         Chronic  - COPD- albuterol PRN. 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  - Depression/anxiety-held fluoxetine, buspirone.   - DM- Continue lantus, insulin sliding scale with hypoglycemia protocol  - PAF- Cont BB with parameters (not on anticoagulation)  - Schizophrenia-held cervical given prolonged QT  - h/o Hepatic encephalopathy- continue rifaximin, lactulose, Lasix, spironolactone  - HCV- Cont On viread  - CHF- not in exacerbation; no recent echo; takes lasix and spironolactone  - hypotension- cont midodrine for SBP < 110          Case d/w ED provider    DVT ppx: Held anticoagulation as as thrombocytopenia   code status: Full code    [facility], Internal Medicine  8/2/2020 at 6:39 PM General

## 2020-08-24 ENCOUNTER — APPOINTMENT (OUTPATIENT)
Dept: ORTHOPEDIC SURGERY | Facility: CLINIC | Age: 60
End: 2020-08-24
Payer: COMMERCIAL

## 2020-08-24 VITALS
WEIGHT: 210 LBS | HEART RATE: 64 BPM | HEIGHT: 70 IN | DIASTOLIC BLOOD PRESSURE: 97 MMHG | BODY MASS INDEX: 30.06 KG/M2 | SYSTOLIC BLOOD PRESSURE: 155 MMHG

## 2020-08-24 PROCEDURE — 99204 OFFICE O/P NEW MOD 45 MIN: CPT

## 2020-08-24 PROCEDURE — 73502 X-RAY EXAM HIP UNI 2-3 VIEWS: CPT | Mod: RT

## 2020-08-24 NOTE — HISTORY OF PRESENT ILLNESS
[de-identified] : 60-year-old female with spontaneous onset of chronic pain right hip. Pain is in the right groin, is constant it fluctuates in severity based on level of weightbearing activity. Obtained some relief in response to Aleve. Status post left total hip replacement 2014 with no complaints

## 2020-08-24 NOTE — PHYSICAL EXAM
[de-identified] : Constitutional:Well nourished , well developed and in no acute distress\par Psychiatric: Alert and oriented to time place and person.Appropriate affect\par Respiratory: Unlabored respirations,no audible wheezing\par Cardiovascular: no leg swelling  ankle edema\par Vascular: no calf or thigh tenderness, \par Peripheral pulses; intact\par Skin:Head, neck, arms and lower extremities:no lesions or discoloration\par Lymphatics:No groin adenopathy\par Neurological: intact light touch sensation and grossly intact coordination and motor power.\par Right hip and upper gait leg lengths equal passive motion restricted to provoke typical pain neurovascular intact\par Left hip satisfactory and pain-free passive range of motion\par  [de-identified] : X-ray AP pelvis right hip AP lateral and satisfactory appearance left total hip replacement right hip demonstrates superolateral joint space narrowing with superolateral bone-on-bone contact subchondral sclerosis marginal osteophytes

## 2020-08-24 NOTE — DISCUSSION/SUMMARY
[de-identified] : Impression osteoarthritis right hip\par Plan increase Aleve, advised not a good candidate for steroid injection given extensive radiographic deterioration. If no improvement and response to increased dose of Aleve we'll consider steroid injection

## 2020-09-01 ENCOUNTER — RX RENEWAL (OUTPATIENT)
Age: 60
End: 2020-09-01

## 2020-09-14 LAB
ALBUMIN SERPL ELPH-MCNC: 4.6 G/DL
ALP BLD-CCNC: 64 U/L
ALT SERPL-CCNC: 26 U/L
ANION GAP SERPL CALC-SCNC: 12 MMOL/L
AST SERPL-CCNC: 20 U/L
BASOPHILS # BLD AUTO: 0.03 K/UL
BASOPHILS NFR BLD AUTO: 0.7 %
BILIRUB SERPL-MCNC: 0.3 MG/DL
BUN SERPL-MCNC: 18 MG/DL
CALCIUM SERPL-MCNC: 9.5 MG/DL
CHLORIDE SERPL-SCNC: 107 MMOL/L
CHOLEST SERPL-MCNC: 206 MG/DL
CHOLEST/HDLC SERPL: 2.5 RATIO
CO2 SERPL-SCNC: 23 MMOL/L
CREAT SERPL-MCNC: 0.78 MG/DL
EOSINOPHIL # BLD AUTO: 0.07 K/UL
EOSINOPHIL NFR BLD AUTO: 1.5 %
ESTIMATED AVERAGE GLUCOSE: 103 MG/DL
GLUCOSE SERPL-MCNC: 84 MG/DL
HBA1C MFR BLD HPLC: 5.2 %
HCT VFR BLD CALC: 41 %
HDLC SERPL-MCNC: 82 MG/DL
HGB BLD-MCNC: 13 G/DL
IMM GRANULOCYTES NFR BLD AUTO: 0 %
LDLC SERPL CALC-MCNC: 106 MG/DL
LYMPHOCYTES # BLD AUTO: 1.55 K/UL
LYMPHOCYTES NFR BLD AUTO: 33.9 %
MAN DIFF?: NORMAL
MCHC RBC-ENTMCNC: 30.9 PG
MCHC RBC-ENTMCNC: 31.7 GM/DL
MCV RBC AUTO: 97.4 FL
MONOCYTES # BLD AUTO: 0.37 K/UL
MONOCYTES NFR BLD AUTO: 8.1 %
NEUTROPHILS # BLD AUTO: 2.55 K/UL
NEUTROPHILS NFR BLD AUTO: 55.8 %
PLATELET # BLD AUTO: 210 K/UL
POTASSIUM SERPL-SCNC: 4.8 MMOL/L
PROT SERPL-MCNC: 6.6 G/DL
RBC # BLD: 4.21 M/UL
RBC # FLD: 13.5 %
SODIUM SERPL-SCNC: 142 MMOL/L
TRIGL SERPL-MCNC: 92 MG/DL
TSH SERPL-ACNC: 0.4 UIU/ML
WBC # FLD AUTO: 4.57 K/UL

## 2020-09-17 NOTE — DISCHARGE NOTE ADULT - DO YOU HAVE DIFFICULTY CLIMBING STAIRS
Medications reviewed and updated.  Denies known Latex allergy or symptoms of Latex sensitivity.  Tobacco verified.      Health Maintenance Due   Topic Date Due   • DM/CKD Microalbumin  03/24/1969   • DTaP/Tdap/Td Vaccine (1 - Tdap) 03/24/1970   • Shingles Vaccine (1 of 2) 03/24/2001   • Pneumococcal Vaccine 65+ (1 of 1 - PPSV23) 03/24/2016   • Medicare Wellness Visit  04/23/2020   • Depression Screening  04/23/2020   • Influenza Vaccine (1) 09/01/2020       Patient is due for topics as listed above but is not proceeding with Immunization(s) Dtap/Tdap/Td, Influenza, Pneumococcal and Shingles at this time. Does not want.     Advance Directives:  discussed and patient declined               No

## 2020-09-18 ENCOUNTER — APPOINTMENT (OUTPATIENT)
Dept: FAMILY MEDICINE | Facility: CLINIC | Age: 60
End: 2020-09-18
Payer: COMMERCIAL

## 2020-09-18 VITALS
RESPIRATION RATE: 16 BRPM | SYSTOLIC BLOOD PRESSURE: 139 MMHG | WEIGHT: 203 LBS | HEIGHT: 70 IN | DIASTOLIC BLOOD PRESSURE: 78 MMHG | HEART RATE: 56 BPM | TEMPERATURE: 97.1 F | OXYGEN SATURATION: 98 % | BODY MASS INDEX: 29.06 KG/M2

## 2020-09-18 DIAGNOSIS — Z20.828 CONTACT WITH AND (SUSPECTED) EXPOSURE TO OTHER VIRAL COMMUNICABLE DISEASES: ICD-10-CM

## 2020-09-18 DIAGNOSIS — Z12.39 ENCOUNTER FOR OTHER SCREENING FOR MALIGNANT NEOPLASM OF BREAST: ICD-10-CM

## 2020-09-18 LAB — 25(OH)D3 SERPL-MCNC: 46.5 NG/ML

## 2020-09-18 PROCEDURE — 99396 PREV VISIT EST AGE 40-64: CPT | Mod: 25

## 2020-09-18 PROCEDURE — 90686 IIV4 VACC NO PRSV 0.5 ML IM: CPT

## 2020-09-18 PROCEDURE — G0008: CPT

## 2020-09-19 PROBLEM — Z12.39 SCREENING FOR BREAST CANCER: Status: ACTIVE | Noted: 2017-07-28

## 2020-09-19 NOTE — REVIEW OF SYSTEMS
[Vision Problems] : vision problems [Nocturia] : nocturia [Joint Pain] : joint pain [Insomnia] : insomnia [Anxiety] : anxiety [Fever] : no fever [Chills] : no chills [Fatigue] : no fatigue [Hot Flashes] : no hot flashes [Night Sweats] : no night sweats [Recent Change In Weight] : ~T no recent weight change [Discharge] : no discharge [Pain] : no pain [Redness] : no redness [Dryness] : no dryness  [Itching] : no itching [Earache] : no earache [Hearing Loss] : no hearing loss [Nosebleed] : no nosebleeds [Hoarseness] : no hoarseness [Nasal Discharge] : no nasal discharge [Sore Throat] : no sore throat [Postnasal Drip] : no postnasal drip [Chest Pain] : no chest pain [Palpitations] : no palpitations [Leg Claudication] : no leg claudication [Lower Ext Edema] : no lower extremity edema [Orthopnea] : no orthopnea [Paroxysmal Nocturnal Dyspnea] : no paroxysmal nocturnal dyspnea [Shortness Of Breath] : no shortness of breath [Wheezing] : no wheezing [Cough] : no cough [Dyspnea on Exertion] : no dyspnea on exertion [Abdominal Pain] : no abdominal pain [Nausea] : no nausea [Constipation] : no constipation [Diarrhea] : diarrhea [Vomiting] : no vomiting [Heartburn] : no heartburn [Melena] : no melena [Dysuria] : no dysuria [Incontinence] : no incontinence [Poor Libido] : libido not poor [Hematuria] : no hematuria [Frequency] : no frequency [Vaginal Discharge] : no vaginal discharge [Dysmenorrhea] : no dysmenorrhea [Joint Stiffness] : no joint stiffness [Joint Swelling] : no joint swelling [Muscle Weakness] : no muscle weakness [Muscle Pain] : no muscle pain [Back Pain] : no back pain [Mole Changes] : no mole changes [Nail Changes] : no nail changes [Hair Changes] : no hair changes [Skin Rash] : no skin rash [Headache] : no headache [Dizziness] : no dizziness [Fainting] : no fainting [Confusion] : no confusion [Memory Loss] : no memory loss [Suicidal] : not suicidal [Depression] : no depression [Easy Bleeding] : no easy bleeding [Easy Bruising] : no easy bruising [Swollen Glands] : no swollen glands [FreeTextEntry3] : glasses  [FreeTextEntry7] : colonoscopy: 6 years ago [FreeTextEntry8] : 0-3 times nocturia [FreeTextEntry9] : right hip pain [de-identified] : intermittent anxiety

## 2020-09-19 NOTE — PHYSICAL EXAM
[No Acute Distress] : no acute distress [Well Nourished] : well nourished [Well Developed] : well developed [Well-Appearing] : well-appearing [Normal Voice/Communication] : normal voice/communication [Normal Sclera/Conjunctiva] : normal sclera/conjunctiva [PERRL] : pupils equal round and reactive to light [EOMI] : extraocular movements intact [Normal Outer Ear/Nose] : the outer ears and nose were normal in appearance [Normal Oropharynx] : the oropharynx was normal [Normal TMs] : both tympanic membranes were normal [No JVD] : no jugular venous distention [No Lymphadenopathy] : no lymphadenopathy [Supple] : supple [Thyroid Normal, No Nodules] : the thyroid was normal and there were no nodules present [No Respiratory Distress] : no respiratory distress  [No Accessory Muscle Use] : no accessory muscle use [Clear to Auscultation] : lungs were clear to auscultation bilaterally [Normal Rate] : normal rate  [Regular Rhythm] : with a regular rhythm [Normal S1, S2] : normal S1 and S2 [Pedal Pulses Present] : the pedal pulses are present [No Edema] : there was no peripheral edema [No Extremity Clubbing/Cyanosis] : no extremity clubbing/cyanosis [Normal Appearance] : normal in appearance [No Nipple Discharge] : no nipple discharge [No Axillary Lymphadenopathy] : no axillary lymphadenopathy [Soft] : abdomen soft [Non Tender] : non-tender [Non-distended] : non-distended [No Masses] : no abdominal mass palpated [No HSM] : no HSM [Normal Supraclavicular Nodes] : no supraclavicular lymphadenopathy [Normal Anterior Cervical Nodes] : no anterior cervical lymphadenopathy [No CVA Tenderness] : no CVA  tenderness [No Spinal Tenderness] : no spinal tenderness [No Joint Swelling] : no joint swelling [Grossly Normal Strength/Tone] : grossly normal strength/tone [No Rash] : no rash [Coordination Grossly Intact] : coordination grossly intact [No Focal Deficits] : no focal deficits [Normal Gait] : normal gait [Speech Grossly Normal] : speech grossly normal [Memory Grossly Normal] : memory grossly normal [Normal Affect] : the affect was normal [Alert and Oriented x3] : oriented to person, place, and time [Normal Mood] : the mood was normal [Normal Insight/Judgement] : insight and judgment were intact

## 2020-12-09 ENCOUNTER — RX RENEWAL (OUTPATIENT)
Age: 60
End: 2020-12-09

## 2021-01-08 ENCOUNTER — RX RENEWAL (OUTPATIENT)
Age: 61
End: 2021-01-08

## 2021-05-03 ENCOUNTER — APPOINTMENT (OUTPATIENT)
Dept: ORTHOPEDIC SURGERY | Facility: CLINIC | Age: 61
End: 2021-05-03
Payer: COMMERCIAL

## 2021-05-03 VITALS
BODY MASS INDEX: 27.92 KG/M2 | WEIGHT: 195 LBS | DIASTOLIC BLOOD PRESSURE: 107 MMHG | SYSTOLIC BLOOD PRESSURE: 158 MMHG | HEIGHT: 70 IN | HEART RATE: 75 BPM

## 2021-05-03 PROCEDURE — 73502 X-RAY EXAM HIP UNI 2-3 VIEWS: CPT | Mod: RT

## 2021-05-03 PROCEDURE — 99072 ADDL SUPL MATRL&STAF TM PHE: CPT

## 2021-05-03 PROCEDURE — 99214 OFFICE O/P EST MOD 30 MIN: CPT

## 2021-05-03 NOTE — PHYSICAL EXAM
[de-identified] : Constitutional:Well nourished , well developed and in no acute distress\par Psychiatric: Alert and oriented to time place and person.Appropriate affect\par Respiratory: Unlabored respirations,no audible wheezing\par Cardiovascular: no leg swelling  ankle edema\par Vascular: no calf or thigh tenderness, \par Peripheral pulses; intact\par Skin:Head, neck, arms and lower extremities:no lesions or discoloration\par Lymphatics:No groin adenopathy\par Neurological: intact light touch sensation and grossly intact coordination and motor power.\par Right hip Trendelenburg gait mild shortening passive range of motion markedly restricted provokes pain flexion 90 external zero internal 30 abduction 30 abduction 35 neurovascular intact\par  [de-identified] : X-rays AP pelvis right hip AP lateral and a satisfactory postoperative left total hip replacement. Right hip demonstrates acetabular dysplasia superolateral joint space narrowing focal bone-on-bone contact subchondral sclerosis

## 2021-05-03 NOTE — HISTORY OF PRESENT ILLNESS
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 Pain over lateral aspect radiating into the groin anterior thigh to the knee and occasionally into the proximal leg. Pain provoked by weightbearing. She symptom free with sitting or lying down. Pain is unresponsive to medication and frequently wakes her up from sleep. She status post left total hip replacement 2014 with no complaints.Patient feels quality of life markedly compromised particularly because of sleep disruption

## 2021-05-03 NOTE — DISCUSSION/SUMMARY
[de-identified] : Impression end-stage osteoarthritis right hip compromise quality of life. Given symptomatology and in particular advanced radiographic deterioration I do not think patient is a candidate for further conservative treatment. I've recommended total hip replacement and reviewed risk benefits alternatives

## 2021-05-03 NOTE — CONSULT LETTER
[Dear  ___] : Dear  [unfilled], [Consult Letter:] : I had the pleasure of evaluating your patient, [unfilled]. [Please see my note below.] : Please see my note below. [Consult Closing:] : Thank you very much for allowing me to participate in the care of this patient.  If you have any questions, please do not hesitate to contact me. [Sincerely,] : Sincerely, [FreeTextEntry2] : IQRA BRAR [FreeTextEntry3] : Yasir Andres MD\par

## 2021-06-03 ENCOUNTER — NON-APPOINTMENT (OUTPATIENT)
Age: 61
End: 2021-06-03

## 2021-06-03 ENCOUNTER — APPOINTMENT (OUTPATIENT)
Dept: CARDIOLOGY | Facility: CLINIC | Age: 61
End: 2021-06-03
Payer: COMMERCIAL

## 2021-06-03 VITALS
HEIGHT: 70 IN | OXYGEN SATURATION: 97 % | DIASTOLIC BLOOD PRESSURE: 82 MMHG | BODY MASS INDEX: 26.92 KG/M2 | WEIGHT: 188 LBS | HEART RATE: 64 BPM | SYSTOLIC BLOOD PRESSURE: 134 MMHG

## 2021-06-03 DIAGNOSIS — K22.4 DYSKINESIA OF ESOPHAGUS: ICD-10-CM

## 2021-06-03 PROCEDURE — 93000 ELECTROCARDIOGRAM COMPLETE: CPT | Mod: NC

## 2021-06-03 PROCEDURE — 99244 OFF/OP CNSLTJ NEW/EST MOD 40: CPT

## 2021-06-03 PROCEDURE — 99072 ADDL SUPL MATRL&STAF TM PHE: CPT

## 2021-06-03 NOTE — ASSESSMENT
[FreeTextEntry1] :  60 year old female  above hx \par \par \par without active cardiac symptoms with limited to exercise capacity due to hip pain , who has abnormal ekg , possible  normal  variant   will recommend echo , chemical stress  test to rule out significant CAD \par \par \par HTN  : low salt diet , decrease using Aleve ,  will continue medication \par \par HLD  mild elevated , continue current dose medication as she can not tolerate increased dose of medication with diet  restriction \par \par Hypothyroidism  controlled continue  medication \par \par follow up after recommended tests  spent  60 minutes

## 2021-06-03 NOTE — PHYSICAL EXAM
[Well Developed] : well developed [Well Nourished] : well nourished [No Acute Distress] : no acute distress [Normal Conjunctiva] : normal conjunctiva [Normal Venous Pressure] : normal venous pressure [No Carotid Bruit] : no carotid bruit [Normal S1, S2] : normal S1, S2 [No Murmur] : no murmur [No Rub] : no rub [No Gallop] : no gallop [Clear Lung Fields] : clear lung fields [Good Air Entry] : good air entry [No Respiratory Distress] : no respiratory distress  [Soft] : abdomen soft [Non Tender] : non-tender [No Masses/organomegaly] : no masses/organomegaly [Normal Bowel Sounds] : normal bowel sounds [Normal Gait] : normal gait [No Edema] : no edema [No Cyanosis] : no cyanosis [No Clubbing] : no clubbing [No Varicosities] : no varicosities [Normal Radial B/L] : normal radial B/L [Normal PT B/L] : normal PT B/L [No Rash] : no rash [No Skin Lesions] : no skin lesions [Moves all extremities] : moves all extremities [No Focal Deficits] : no focal deficits [Normal Speech] : normal speech [Alert and Oriented] : alert and oriented [Normal memory] : normal memory

## 2021-06-03 NOTE — REVIEW OF SYSTEMS
[Joint Pain] : joint pain [Joint Stiffness] : joint stiffness [Negative] : Psychiatric [Fever] : no fever [Headache] : no headache [Blurry Vision] : no blurred vision [Seeing Double (Diplopia)] : no diplopia [Earache] : no earache [Sore Throat] : no sore throat [SOB] : no shortness of breath [Dyspnea on exertion] : not dyspnea during exertion [Chest Discomfort] : no chest discomfort [Leg Claudication] : no intermittent leg claudication [Syncope] : no syncope [Cough] : no cough [Wheezing] : no wheezing [Abdominal Pain] : no abdominal pain [Change in Appetite] : no change in appetite [Constipation] : no constipation [Dysuria] : no dysuria [Pelvic Pain] : no pelvic pain [Abn Vaginal Bleeding] : no unexplained vaginal bleeding [Confusion] : no confusion was observed [Memory Lapses Or Loss] : no memory lapses or loss [Depression] : no depression [Easy Bleeding] : no tendency for easy bleeding [Swollen Glands] : no swollen glands [Easy Bruising] : no tendency for easy bruising

## 2021-06-03 NOTE — HISTORY OF PRESENT ILLNESS
[FreeTextEntry1] : 60 year  old female  with hx of HTN HLD  esophageal spasm rare episode GERD  hypothyroidism  came for pre op cardiac evaluation for right hip replacement . Patient denies active cardiac symptoms currently , patient did have hx of esophageal spasm associated with chest pain resolvs with drinking water , denies any exertional chest pain or shortness of breath or dizziness or palpitation , though her physical exertion is limited due to hip pain \par \par her blood pressure is controlled , patient ekg  is abnormal some what similar to prior ekg   her cholesterol is mild elevated 206  LDl  106  she is taking 40 mg pravastatin , patient does have migraine attacks if she increases the dose ,  did not tolerate crestor \par \par Patient blood pressure is high normal range patient is taking aleeve regularly

## 2021-06-03 NOTE — REASON FOR VISIT
[Symptom and Test Evaluation] : symptom and test evaluation [Arrhythmia/ECG Abnorrmalities] : arrhythmia/ECG abnormalities [Hyperlipidemia] : hyperlipidemia [Hypertension] : hypertension [Other: ____] : [unfilled]

## 2021-06-04 ENCOUNTER — APPOINTMENT (OUTPATIENT)
Dept: CARDIOLOGY | Facility: CLINIC | Age: 61
End: 2021-06-04
Payer: COMMERCIAL

## 2021-06-04 PROCEDURE — 93306 TTE W/DOPPLER COMPLETE: CPT

## 2021-06-04 PROCEDURE — 99072 ADDL SUPL MATRL&STAF TM PHE: CPT

## 2021-06-09 ENCOUNTER — RX RENEWAL (OUTPATIENT)
Age: 61
End: 2021-06-09

## 2021-06-11 ENCOUNTER — RX RENEWAL (OUTPATIENT)
Age: 61
End: 2021-06-11

## 2021-06-15 ENCOUNTER — OUTPATIENT (OUTPATIENT)
Dept: OUTPATIENT SERVICES | Facility: HOSPITAL | Age: 61
LOS: 1 days | End: 2021-06-15
Payer: COMMERCIAL

## 2021-06-15 ENCOUNTER — RESULT REVIEW (OUTPATIENT)
Age: 61
End: 2021-06-15

## 2021-06-15 DIAGNOSIS — Z90.710 ACQUIRED ABSENCE OF BOTH CERVIX AND UTERUS: Chronic | ICD-10-CM

## 2021-06-15 DIAGNOSIS — R94.31 ABNORMAL ELECTROCARDIOGRAM [ECG] [EKG]: ICD-10-CM

## 2021-06-15 DIAGNOSIS — Z96.642 PRESENCE OF LEFT ARTIFICIAL HIP JOINT: Chronic | ICD-10-CM

## 2021-06-15 PROCEDURE — 93016 CV STRESS TEST SUPVJ ONLY: CPT

## 2021-06-15 PROCEDURE — 93018 CV STRESS TEST I&R ONLY: CPT

## 2021-06-15 PROCEDURE — 93017 CV STRESS TEST TRACING ONLY: CPT

## 2021-06-15 PROCEDURE — A9500: CPT

## 2021-06-15 PROCEDURE — 78452 HT MUSCLE IMAGE SPECT MULT: CPT

## 2021-06-15 PROCEDURE — 78452 HT MUSCLE IMAGE SPECT MULT: CPT | Mod: 26

## 2021-06-15 RX ORDER — REGADENOSON 0.08 MG/ML
0.4 INJECTION, SOLUTION INTRAVENOUS ONCE
Refills: 0 | Status: COMPLETED | OUTPATIENT
Start: 2021-06-15 | End: 2021-06-15

## 2021-06-15 RX ADMIN — REGADENOSON 0.4 MILLIGRAM(S): 0.08 INJECTION, SOLUTION INTRAVENOUS at 10:45

## 2021-06-16 DIAGNOSIS — R94.31 ABNORMAL ELECTROCARDIOGRAM [ECG] [EKG]: ICD-10-CM

## 2021-06-17 ENCOUNTER — OUTPATIENT (OUTPATIENT)
Dept: OUTPATIENT SERVICES | Facility: HOSPITAL | Age: 61
LOS: 1 days | End: 2021-06-17
Payer: COMMERCIAL

## 2021-06-17 VITALS
RESPIRATION RATE: 14 BRPM | WEIGHT: 188.05 LBS | DIASTOLIC BLOOD PRESSURE: 72 MMHG | OXYGEN SATURATION: 97 % | HEART RATE: 68 BPM | TEMPERATURE: 97 F | SYSTOLIC BLOOD PRESSURE: 136 MMHG | HEIGHT: 70 IN

## 2021-06-17 DIAGNOSIS — Z01.818 ENCOUNTER FOR OTHER PREPROCEDURAL EXAMINATION: ICD-10-CM

## 2021-06-17 DIAGNOSIS — Z90.710 ACQUIRED ABSENCE OF BOTH CERVIX AND UTERUS: Chronic | ICD-10-CM

## 2021-06-17 DIAGNOSIS — M16.11 UNILATERAL PRIMARY OSTEOARTHRITIS, RIGHT HIP: ICD-10-CM

## 2021-06-17 DIAGNOSIS — Z96.642 PRESENCE OF LEFT ARTIFICIAL HIP JOINT: Chronic | ICD-10-CM

## 2021-06-17 DIAGNOSIS — N81.9 FEMALE GENITAL PROLAPSE, UNSPECIFIED: Chronic | ICD-10-CM

## 2021-06-17 DIAGNOSIS — M19.90 UNSPECIFIED OSTEOARTHRITIS, UNSPECIFIED SITE: ICD-10-CM

## 2021-06-17 LAB
ALBUMIN SERPL ELPH-MCNC: 4.1 G/DL — SIGNIFICANT CHANGE UP (ref 3.3–5)
ALP SERPL-CCNC: 69 U/L — SIGNIFICANT CHANGE UP (ref 30–120)
ALT FLD-CCNC: 38 U/L DA — SIGNIFICANT CHANGE UP (ref 10–60)
ANION GAP SERPL CALC-SCNC: 7 MMOL/L — SIGNIFICANT CHANGE UP (ref 5–17)
APPEARANCE UR: CLEAR — SIGNIFICANT CHANGE UP
APTT BLD: 36.5 SEC — HIGH (ref 27.5–35.5)
AST SERPL-CCNC: 21 U/L — SIGNIFICANT CHANGE UP (ref 10–40)
BILIRUB SERPL-MCNC: 0.3 MG/DL — SIGNIFICANT CHANGE UP (ref 0.2–1.2)
BILIRUB UR-MCNC: NEGATIVE — SIGNIFICANT CHANGE UP
BLD GP AB SCN SERPL QL: SIGNIFICANT CHANGE UP
BUN SERPL-MCNC: 23 MG/DL — SIGNIFICANT CHANGE UP (ref 7–23)
CALCIUM SERPL-MCNC: 9.3 MG/DL — SIGNIFICANT CHANGE UP (ref 8.4–10.5)
CHLORIDE SERPL-SCNC: 106 MMOL/L — SIGNIFICANT CHANGE UP (ref 96–108)
CO2 SERPL-SCNC: 28 MMOL/L — SIGNIFICANT CHANGE UP (ref 22–31)
COLOR SPEC: YELLOW — SIGNIFICANT CHANGE UP
CREAT SERPL-MCNC: 0.71 MG/DL — SIGNIFICANT CHANGE UP (ref 0.5–1.3)
DIFF PNL FLD: ABNORMAL
EPI CELLS # UR: SIGNIFICANT CHANGE UP
GLUCOSE SERPL-MCNC: 91 MG/DL — SIGNIFICANT CHANGE UP (ref 70–99)
GLUCOSE UR QL: NEGATIVE MG/DL — SIGNIFICANT CHANGE UP
HCT VFR BLD CALC: 41.3 % — SIGNIFICANT CHANGE UP (ref 34.5–45)
HGB BLD-MCNC: 13.4 G/DL — SIGNIFICANT CHANGE UP (ref 11.5–15.5)
INR BLD: 0.84 RATIO — LOW (ref 0.88–1.16)
KETONES UR-MCNC: NEGATIVE — SIGNIFICANT CHANGE UP
LEUKOCYTE ESTERASE UR-ACNC: NEGATIVE — SIGNIFICANT CHANGE UP
MCHC RBC-ENTMCNC: 30.9 PG — SIGNIFICANT CHANGE UP (ref 27–34)
MCHC RBC-ENTMCNC: 32.4 GM/DL — SIGNIFICANT CHANGE UP (ref 32–36)
MCV RBC AUTO: 95.2 FL — SIGNIFICANT CHANGE UP (ref 80–100)
NITRITE UR-MCNC: NEGATIVE — SIGNIFICANT CHANGE UP
NRBC # BLD: 0 /100 WBCS — SIGNIFICANT CHANGE UP (ref 0–0)
PH UR: 5 — SIGNIFICANT CHANGE UP (ref 5–8)
PLATELET # BLD AUTO: 248 K/UL — SIGNIFICANT CHANGE UP (ref 150–400)
POTASSIUM SERPL-MCNC: 4.1 MMOL/L — SIGNIFICANT CHANGE UP (ref 3.5–5.3)
POTASSIUM SERPL-SCNC: 4.1 MMOL/L — SIGNIFICANT CHANGE UP (ref 3.5–5.3)
PROT SERPL-MCNC: 7.1 G/DL — SIGNIFICANT CHANGE UP (ref 6–8.3)
PROT UR-MCNC: 15 MG/DL
PROTHROM AB SERPL-ACNC: 10.3 SEC — LOW (ref 10.6–13.6)
RBC # BLD: 4.34 M/UL — SIGNIFICANT CHANGE UP (ref 3.8–5.2)
RBC # FLD: 13.9 % — SIGNIFICANT CHANGE UP (ref 10.3–14.5)
RBC CASTS # UR COMP ASSIST: SIGNIFICANT CHANGE UP /HPF (ref 0–4)
SODIUM SERPL-SCNC: 141 MMOL/L — SIGNIFICANT CHANGE UP (ref 135–145)
SP GR SPEC: 1.02 — SIGNIFICANT CHANGE UP (ref 1.01–1.02)
UROBILINOGEN FLD QL: NEGATIVE MG/DL — SIGNIFICANT CHANGE UP
WBC # BLD: 4.29 K/UL — SIGNIFICANT CHANGE UP (ref 3.8–10.5)
WBC # FLD AUTO: 4.29 K/UL — SIGNIFICANT CHANGE UP (ref 3.8–10.5)

## 2021-06-17 PROCEDURE — G0463: CPT

## 2021-06-17 NOTE — H&P PST ADULT - NSICDXPASTMEDICALHX_GEN_ALL_CORE_FT
PAST MEDICAL HISTORY:  Bulging lumbar disc L4-5; treated with PINA (last in June 2013)    Cervical disc disease herniated discs; unsure levels (asymptomatic)    Cluster headache     Diverticulosis asymptomatic    Esophageal spasm endoscopy and colonoscopy negative; no reflux    History of recurrent UTIs stable since calpopexy    HTN (hypertension)     Hyperlipemia     Hypothyroid     Osteoarthritis      PAST MEDICAL HISTORY:  Bulging lumbar disc L4-5; treated with PINA (last in June 2013)    Cervical disc disease herniated discs; unsure levels (asymptomatic)    Cluster headache     COVID-19 vaccine series completed moderna 4/2/21    Diverticulosis asymptomatic    Esophageal spasm endoscopy and colonoscopy negative; no reflux    History of recurrent UTIs stable since colpopexy    HTN (hypertension)     Hyperlipemia     Hypothyroid     Osteoarthritis hip

## 2021-06-17 NOTE — H&P PST ADULT - NSICDXPROBLEM_GEN_ALL_CORE_FT
PROBLEM DIAGNOSES  Problem: Osteoarthritis  Assessment and Plan: Right hip replacement on 7/1/21  Medical and cardiac clearance   stress and echo in chart   Pre op instructions on wash, ERAS, ,   H/o UTI uriine culture done

## 2021-06-17 NOTE — H&P PST ADULT - HISTORY OF PRESENT ILLNESS
59 y/o female with PMH of HTN, HLD, Hypothyroidism , OA s/p left hip replacement in 2014, hysterectomy . Pt has been experiencing vaginal  bulging , stress incontinence followed by GYN . Presents to PST for scheduled Cystoscopy. 60 y/o female with PMH of HTN, HLD, Hypothyroidism, UTI presents with 2 year history of progressively worsening right hip pain which radiates to groin, Reports constant pain and worst pain walking , stairs and while sleeping . scheduled for right hip replacement on 7/1/21

## 2021-06-17 NOTE — H&P PST ADULT - NEGATIVE GENERAL GENITOURINARY SYMPTOMS
no hematuria/no renal colic/no flank pain L/no flank pain R/no dysuria/no urinary hesitancy/no nocturia

## 2021-06-17 NOTE — H&P PST ADULT - NSICDXPASTSURGICALHX_GEN_ALL_CORE_FT
PAST SURGICAL HISTORY:  History of total left hip replacement 2014    Normal vaginal delivery x 3    S/P hysterectomy 2004 after prolapse     PAST SURGICAL HISTORY:  History of total left hip replacement 2014    Normal vaginal delivery x 3    Pelvic prolapse laparoscopic colpopexy 2019    S/P hysterectomy 2004 after prolapse

## 2021-06-18 LAB
CULTURE RESULTS: NO GROWTH — SIGNIFICANT CHANGE UP
MRSA PCR RESULT.: SIGNIFICANT CHANGE UP
S AUREUS DNA NOSE QL NAA+PROBE: DETECTED
SPECIMEN SOURCE: SIGNIFICANT CHANGE UP

## 2021-06-21 ENCOUNTER — NON-APPOINTMENT (OUTPATIENT)
Age: 61
End: 2021-06-21

## 2021-06-21 ENCOUNTER — APPOINTMENT (OUTPATIENT)
Dept: CARDIOLOGY | Facility: CLINIC | Age: 61
End: 2021-06-21
Payer: COMMERCIAL

## 2021-06-21 VITALS
SYSTOLIC BLOOD PRESSURE: 146 MMHG | DIASTOLIC BLOOD PRESSURE: 86 MMHG | OXYGEN SATURATION: 99 % | WEIGHT: 188 LBS | BODY MASS INDEX: 26.92 KG/M2 | HEIGHT: 70 IN | HEART RATE: 55 BPM

## 2021-06-21 VITALS — DIASTOLIC BLOOD PRESSURE: 84 MMHG | SYSTOLIC BLOOD PRESSURE: 150 MMHG

## 2021-06-21 PROCEDURE — 99072 ADDL SUPL MATRL&STAF TM PHE: CPT

## 2021-06-21 PROCEDURE — 93000 ELECTROCARDIOGRAM COMPLETE: CPT | Mod: NC

## 2021-06-21 PROCEDURE — 99214 OFFICE O/P EST MOD 30 MIN: CPT

## 2021-06-21 RX ORDER — MUPIROCIN 20 MG/G
1 OINTMENT TOPICAL
Qty: 1 | Refills: 0
Start: 2021-06-21 | End: 2021-06-25

## 2021-06-21 NOTE — CARDIOLOGY SUMMARY
[de-identified] : 6/3/21 sinus rhythm Poor R wave progression ,  TWI anterior leads V1 to V3  [de-identified] : 6/15/21  Normal chemical nuclear stress test  [de-identified] : 6/4/21  moderately dilated aortic root 4.5 cm ,  dilated ascending aorta 4.3 cm  normal left ventricular systolic function

## 2021-06-21 NOTE — REASON FOR VISIT
[Symptom and Test Evaluation] : symptom and test evaluation [Arrhythmia/ECG Abnorrmalities] : arrhythmia/ECG abnormalities [Hyperlipidemia] : hyperlipidemia [Hypertension] : hypertension [Other: ____] : [unfilled] [Structural Heart and Valve Disease] : structural heart and valve disease

## 2021-06-21 NOTE — ASSESSMENT
[FreeTextEntry1] :  60 year old female  above hx \par \par \par without active cardiac symptoms with limited to exercise capacity due to hip pain , who has abnormal ekg , possible  normal  variant   with normal nuclear stress test , normal LVEF   who is stable and optimal for surgery , patient is low to intermediate risk for intermediate risk surgery \par \par \par HTN  : mild elevated non compliance to low salt diet , encourage to be compliant to low salt diet \par \par Dilated aortic root and ascending aorta   : will try to control her blood pressure , will need close monitoring of aorta size periodically \par \par HLD  mild elevated ,  increase  pravastatin  to 80 mg daily  with diet  restriction \par \par Hypothyroidism  controlled continue  medication \par \par

## 2021-06-21 NOTE — PROGRESS NOTE ADULT - SUBJECTIVE AND OBJECTIVE BOX
Nasal PCR culture: staph aureus detected  Topical mupirocin escribed  Spoke to patient; understands treatment

## 2021-06-21 NOTE — HISTORY OF PRESENT ILLNESS
[FreeTextEntry1] : 61  year  old female  with hx of HTN HLD  esophageal spasm rare episode GERD  hypothyroidism  came for pre op cardiac evaluation for right hip replacement . patient had recommended tests , Patient had normal chemical stress test ,   echo showed dilated aortic root 4.5 cm  and ascending aorta\par \par \par \par  Patient denies active cardiac symptoms currently , patient did have hx of esophageal spasm associated with chest pain resolves with drinking water , denies any exertional chest pain or shortness of breath or dizziness or palpitation , though her physical exertion is limited due to hip pain \par \par her blood pressure is not well controlled , , patient ekg  is abnormal some what similar to prior ekg   her cholesterol is mild elevated 206  LDL   106  she is taking 40 mg pravastatin , patient does have migraine attacks if she increases the dose ,  did not tolerate crestor \par \par

## 2021-06-23 ENCOUNTER — APPOINTMENT (OUTPATIENT)
Dept: FAMILY MEDICINE | Facility: CLINIC | Age: 61
End: 2021-06-23
Payer: COMMERCIAL

## 2021-06-23 VITALS
RESPIRATION RATE: 16 BRPM | WEIGHT: 190 LBS | HEIGHT: 70 IN | OXYGEN SATURATION: 99 % | DIASTOLIC BLOOD PRESSURE: 82 MMHG | TEMPERATURE: 98.6 F | SYSTOLIC BLOOD PRESSURE: 140 MMHG | HEART RATE: 70 BPM | BODY MASS INDEX: 27.2 KG/M2

## 2021-06-23 DIAGNOSIS — M25.551 PAIN IN RIGHT HIP: ICD-10-CM

## 2021-06-23 PROCEDURE — 99214 OFFICE O/P EST MOD 30 MIN: CPT

## 2021-06-23 PROCEDURE — 99072 ADDL SUPL MATRL&STAF TM PHE: CPT

## 2021-06-23 NOTE — ASSESSMENT
[Patient Optimized for Surgery] : Patient optimized for surgery [As per surgery] : as per surgery [High Risk Surgery - Intraperitoneal, Intrathoracic or Supringuinal Vascular Procedures] : High Risk Surgery - Intraperitoneal, Intrathoracic or Supringuinal Vascular Procedures - No (0) [Ischemic Heart Disease] : Ischemic Heart Disease - No (0) [Congestive Heart Failure] : Congestive Heart Failure - No (0) [Prior Cerebrovascular Accident or TIA] : Prior Cerebrovascular Accident or TIA - No (0) [Creatinine >= 2mg/dL (1 Point)] : Creatinine >= 2mg/dL - No (0) [Insulin-dependent Diabetic (1 Point)] : Insulin-dependent Diabetic - No (0) [FreeTextEntry2] : stop all supplements and vitamins 1 week prior to surgery, take metoprolol 50 mg and losartan 50 mg morning of surgery with sips of water

## 2021-06-23 NOTE — REVIEW OF SYSTEMS
[Joint Pain] : joint pain [Insomnia] : insomnia [Fever] : no fever [Chills] : no chills [Fatigue] : no fatigue [Hot Flashes] : no hot flashes [Night Sweats] : no night sweats [Recent Change In Weight] : ~T no recent weight change [Discharge] : no discharge [Pain] : no pain [Redness] : no redness [Dryness] : no dryness  [Vision Problems] : no vision problems [Itching] : no itching [Earache] : no earache [Hearing Loss] : no hearing loss [Nosebleed] : no nosebleeds [Hoarseness] : no hoarseness [Nasal Discharge] : no nasal discharge [Sore Throat] : no sore throat [Postnasal Drip] : no postnasal drip [Chest Pain] : no chest pain [Palpitations] : no palpitations [Leg Claudication] : no leg claudication [Lower Ext Edema] : no lower extremity edema [Orthopnea] : no orthopnea [Paroxysmal Nocturnal Dyspnea] : no paroxysmal nocturnal dyspnea [Shortness Of Breath] : no shortness of breath [Wheezing] : no wheezing [Cough] : no cough [Dyspnea on Exertion] : no dyspnea on exertion [Abdominal Pain] : no abdominal pain [Nausea] : no nausea [Constipation] : no constipation [Diarrhea] : diarrhea [Vomiting] : no vomiting [Heartburn] : no heartburn [Melena] : no melena [Dysuria] : no dysuria [Incontinence] : no incontinence [Nocturia] : no nocturia [Hematuria] : no hematuria [Frequency] : no frequency [Joint Stiffness] : no joint stiffness [Joint Swelling] : no joint swelling [Muscle Weakness] : no muscle weakness [Muscle Pain] : no muscle pain [Mole Changes] : no mole changes [Nail Changes] : no nail changes [Hair Changes] : no hair changes [Skin Rash] : no skin rash [Headache] : no headache [Dizziness] : no dizziness [Fainting] : no fainting [Confusion] : no confusion [Memory Loss] : no memory loss [Suicidal] : not suicidal [Anxiety] : no anxiety [Depression] : no depression [Easy Bleeding] : no easy bleeding [Easy Bruising] : no easy bruising [Swollen Glands] : no swollen glands [FreeTextEntry9] : right hip pain

## 2021-06-23 NOTE — HISTORY OF PRESENT ILLNESS
[(Patient denies any chest pain, claudication, dyspnea on exertion, orthopnea, palpitations or syncope)] : Patient denies any chest pain, claudication, dyspnea on exertion, orthopnea, palpitations or syncope [Unable to assess] : unable to assess [Aortic Stenosis] : no aortic stenosis [Atrial Fibrillation] : no atrial fibrillation [Coronary Artery Disease] : no coronary artery disease [Recent Myocardial Infarction] : no recent myocardial infarction [Implantable Device/Pacemaker] : no implantable device/pacemaker [Asthma] : no asthma [COPD] : no COPD [Sleep Apnea] : no sleep apnea [Smoker] : not a smoker [Family Member] : no family member with adverse anesthesia reaction/sudden death [Self] : no previous adverse anesthesia reaction [Chronic Anticoagulation] : no chronic anticoagulation [Chronic Kidney Disease] : no chronic kidney disease [Diabetes] : no diabetes [FreeTextEntry1] : right hip [FreeTextEntry2] : 7/01/2021 [FreeTextEntry3] : Dr. Andres, fax: [phone] [FreeTextEntry8] : unable to exercise due to severe right hip pain, had cardiology evaluation/nuclear stress test

## 2021-06-28 ENCOUNTER — APPOINTMENT (OUTPATIENT)
Dept: ORTHOPEDIC SURGERY | Facility: CLINIC | Age: 61
End: 2021-06-28
Payer: COMMERCIAL

## 2021-06-28 VITALS
WEIGHT: 190 LBS | HEIGHT: 70 IN | SYSTOLIC BLOOD PRESSURE: 143 MMHG | BODY MASS INDEX: 27.2 KG/M2 | DIASTOLIC BLOOD PRESSURE: 87 MMHG | HEART RATE: 63 BPM

## 2021-06-28 DIAGNOSIS — M16.11 UNILATERAL PRIMARY OSTEOARTHRITIS, RIGHT HIP: ICD-10-CM

## 2021-06-28 PROCEDURE — 99072 ADDL SUPL MATRL&STAF TM PHE: CPT

## 2021-06-28 PROCEDURE — 99212 OFFICE O/P EST SF 10 MIN: CPT

## 2021-06-28 NOTE — HISTORY OF PRESENT ILLNESS
[de-identified] : Follow-up pain right hip. Pain over lateral aspect radiating into the groin anterior thigh to the knee and occasionally into the proximal leg. Pain provoked by weightbearing. She symptom free with sitting or lying down. Pain is unresponsive to medication and frequently wakes her up from sleep. She status post left total hip replacement 2014 with no complaints.Patient feels quality of life markedly compromised particularly because of sleep disruption

## 2021-06-28 NOTE — PHYSICAL EXAM
[de-identified] : Constitutional:Well nourished , well developed and in no acute distress\par Psychiatric: Alert and oriented to time place and person.Appropriate affect\par Respiratory: Unlabored respirations,no audible wheezing\par Cardiovascular: no leg swelling  ankle edema\par Vascular: no calf or thigh tenderness, \par Peripheral pulses; intact\par Skin:Head, neck, arms and lower extremities:no lesions or discoloration\par Lymphatics:No groin adenopathy\par Neurological: intact light touch sensation and grossly intact coordination and motor power.\par Right hip Trendelenburg gait mild shortening passive range of motion markedly restricted provokes pain flexion 90 external zero internal 30 abduction 30 abduction 35 neurovascular intact skin intact\par  [de-identified] : Review x-rays AP pelvis right hip AP lateral and a satisfactory postoperative left total hip replacement. Right hip demonstrates acetabular dysplasia superolateral joint space narrowing focal bone-on-bone contact subchondral sclerosis

## 2021-06-28 NOTE — DISCUSSION/SUMMARY
[de-identified] : Impression end-stage osteoarthritis right hip\par Plan right total hip replacement

## 2021-06-29 RX ORDER — OXYCODONE HYDROCHLORIDE 5 MG/1
10 TABLET ORAL
Refills: 0 | Status: DISCONTINUED | OUTPATIENT
Start: 2021-07-01 | End: 2021-07-02

## 2021-06-29 RX ORDER — PANTOPRAZOLE SODIUM 20 MG/1
40 TABLET, DELAYED RELEASE ORAL
Refills: 0 | Status: DISCONTINUED | OUTPATIENT
Start: 2021-07-01 | End: 2021-07-02

## 2021-06-29 RX ORDER — ONDANSETRON 8 MG/1
4 TABLET, FILM COATED ORAL EVERY 6 HOURS
Refills: 0 | Status: DISCONTINUED | OUTPATIENT
Start: 2021-07-01 | End: 2021-07-02

## 2021-06-29 RX ORDER — SODIUM CHLORIDE 9 MG/ML
1000 INJECTION, SOLUTION INTRAVENOUS
Refills: 0 | Status: DISCONTINUED | OUTPATIENT
Start: 2021-07-01 | End: 2021-07-02

## 2021-06-29 RX ORDER — MAGNESIUM HYDROXIDE 400 MG/1
30 TABLET, CHEWABLE ORAL DAILY
Refills: 0 | Status: DISCONTINUED | OUTPATIENT
Start: 2021-07-01 | End: 2021-07-02

## 2021-06-29 RX ORDER — POLYETHYLENE GLYCOL 3350 17 G/17G
17 POWDER, FOR SOLUTION ORAL AT BEDTIME
Refills: 0 | Status: DISCONTINUED | OUTPATIENT
Start: 2021-07-01 | End: 2021-07-02

## 2021-06-29 RX ORDER — ACETAMINOPHEN 500 MG
1000 TABLET ORAL EVERY 8 HOURS
Refills: 0 | Status: DISCONTINUED | OUTPATIENT
Start: 2021-07-02 | End: 2021-07-02

## 2021-06-29 RX ORDER — SODIUM CHLORIDE 9 MG/ML
500 INJECTION INTRAMUSCULAR; INTRAVENOUS; SUBCUTANEOUS ONCE
Refills: 0 | Status: COMPLETED | OUTPATIENT
Start: 2021-07-01 | End: 2021-07-01

## 2021-06-29 RX ORDER — HYDROMORPHONE HYDROCHLORIDE 2 MG/ML
0.5 INJECTION INTRAMUSCULAR; INTRAVENOUS; SUBCUTANEOUS
Refills: 0 | Status: DISCONTINUED | OUTPATIENT
Start: 2021-07-01 | End: 2021-07-02

## 2021-06-29 RX ORDER — SENNA PLUS 8.6 MG/1
2 TABLET ORAL AT BEDTIME
Refills: 0 | Status: DISCONTINUED | OUTPATIENT
Start: 2021-07-01 | End: 2021-07-02

## 2021-06-29 RX ORDER — OXYCODONE HYDROCHLORIDE 5 MG/1
5 TABLET ORAL
Refills: 0 | Status: DISCONTINUED | OUTPATIENT
Start: 2021-07-01 | End: 2021-07-02

## 2021-06-30 ENCOUNTER — FORM ENCOUNTER (OUTPATIENT)
Age: 61
End: 2021-06-30

## 2021-06-30 ENCOUNTER — TRANSCRIPTION ENCOUNTER (OUTPATIENT)
Age: 61
End: 2021-06-30

## 2021-07-01 ENCOUNTER — RESULT REVIEW (OUTPATIENT)
Age: 61
End: 2021-07-01

## 2021-07-01 ENCOUNTER — APPOINTMENT (OUTPATIENT)
Dept: ORTHOPEDIC SURGERY | Facility: HOSPITAL | Age: 61
End: 2021-07-01

## 2021-07-01 ENCOUNTER — INPATIENT (INPATIENT)
Facility: HOSPITAL | Age: 61
LOS: 0 days | Discharge: HOME | DRG: 470 | End: 2021-07-02
Attending: ORTHOPAEDIC SURGERY | Admitting: ORTHOPAEDIC SURGERY
Payer: COMMERCIAL

## 2021-07-01 ENCOUNTER — TRANSCRIPTION ENCOUNTER (OUTPATIENT)
Age: 61
End: 2021-07-01

## 2021-07-01 VITALS
DIASTOLIC BLOOD PRESSURE: 71 MMHG | WEIGHT: 187.17 LBS | RESPIRATION RATE: 10 BRPM | TEMPERATURE: 98 F | OXYGEN SATURATION: 100 % | HEIGHT: 70 IN | SYSTOLIC BLOOD PRESSURE: 113 MMHG | HEART RATE: 67 BPM

## 2021-07-01 DIAGNOSIS — G47.00 INSOMNIA, UNSPECIFIED: ICD-10-CM

## 2021-07-01 DIAGNOSIS — Z96.642 PRESENCE OF LEFT ARTIFICIAL HIP JOINT: Chronic | ICD-10-CM

## 2021-07-01 DIAGNOSIS — E03.9 HYPOTHYROIDISM, UNSPECIFIED: ICD-10-CM

## 2021-07-01 DIAGNOSIS — Z90.710 ACQUIRED ABSENCE OF BOTH CERVIX AND UTERUS: Chronic | ICD-10-CM

## 2021-07-01 DIAGNOSIS — E78.5 HYPERLIPIDEMIA, UNSPECIFIED: ICD-10-CM

## 2021-07-01 DIAGNOSIS — Z01.818 ENCOUNTER FOR OTHER PREPROCEDURAL EXAMINATION: ICD-10-CM

## 2021-07-01 DIAGNOSIS — I10 ESSENTIAL (PRIMARY) HYPERTENSION: ICD-10-CM

## 2021-07-01 DIAGNOSIS — N81.9 FEMALE GENITAL PROLAPSE, UNSPECIFIED: Chronic | ICD-10-CM

## 2021-07-01 DIAGNOSIS — M16.11 UNILATERAL PRIMARY OSTEOARTHRITIS, RIGHT HIP: ICD-10-CM

## 2021-07-01 DIAGNOSIS — Z29.9 ENCOUNTER FOR PROPHYLACTIC MEASURES, UNSPECIFIED: ICD-10-CM

## 2021-07-01 LAB
ANION GAP SERPL CALC-SCNC: 6 MMOL/L — SIGNIFICANT CHANGE UP (ref 5–17)
BUN SERPL-MCNC: 9 MG/DL — SIGNIFICANT CHANGE UP (ref 7–23)
CALCIUM SERPL-MCNC: 8.6 MG/DL — SIGNIFICANT CHANGE UP (ref 8.4–10.5)
CHLORIDE SERPL-SCNC: 102 MMOL/L — SIGNIFICANT CHANGE UP (ref 96–108)
CO2 SERPL-SCNC: 27 MMOL/L — SIGNIFICANT CHANGE UP (ref 22–31)
CREAT SERPL-MCNC: 0.69 MG/DL — SIGNIFICANT CHANGE UP (ref 0.5–1.3)
GLUCOSE SERPL-MCNC: 161 MG/DL — HIGH (ref 70–99)
HCT VFR BLD CALC: 33.1 % — LOW (ref 34.5–45)
HGB BLD-MCNC: 10.8 G/DL — LOW (ref 11.5–15.5)
MCHC RBC-ENTMCNC: 31.1 PG — SIGNIFICANT CHANGE UP (ref 27–34)
MCHC RBC-ENTMCNC: 32.6 GM/DL — SIGNIFICANT CHANGE UP (ref 32–36)
MCV RBC AUTO: 95.4 FL — SIGNIFICANT CHANGE UP (ref 80–100)
NRBC # BLD: 0 /100 WBCS — SIGNIFICANT CHANGE UP (ref 0–0)
PLATELET # BLD AUTO: 203 K/UL — SIGNIFICANT CHANGE UP (ref 150–400)
POTASSIUM SERPL-MCNC: 4.1 MMOL/L — SIGNIFICANT CHANGE UP (ref 3.5–5.3)
POTASSIUM SERPL-SCNC: 4.1 MMOL/L — SIGNIFICANT CHANGE UP (ref 3.5–5.3)
RBC # BLD: 3.47 M/UL — LOW (ref 3.8–5.2)
RBC # FLD: 13.9 % — SIGNIFICANT CHANGE UP (ref 10.3–14.5)
SODIUM SERPL-SCNC: 135 MMOL/L — SIGNIFICANT CHANGE UP (ref 135–145)
WBC # BLD: 8.18 K/UL — SIGNIFICANT CHANGE UP (ref 3.8–10.5)
WBC # FLD AUTO: 8.18 K/UL — SIGNIFICANT CHANGE UP (ref 3.8–10.5)

## 2021-07-01 PROCEDURE — 27130 TOTAL HIP ARTHROPLASTY: CPT | Mod: RT

## 2021-07-01 PROCEDURE — 99253 IP/OBS CNSLTJ NEW/EST LOW 45: CPT

## 2021-07-01 PROCEDURE — 88305 TISSUE EXAM BY PATHOLOGIST: CPT | Mod: 26

## 2021-07-01 PROCEDURE — 88311 DECALCIFY TISSUE: CPT | Mod: 26

## 2021-07-01 RX ORDER — LEVOTHYROXINE SODIUM 125 MCG
150 TABLET ORAL DAILY
Refills: 0 | Status: DISCONTINUED | OUTPATIENT
Start: 2021-07-01 | End: 2021-07-02

## 2021-07-01 RX ORDER — ZOLPIDEM TARTRATE 10 MG/1
5 TABLET ORAL AT BEDTIME
Refills: 0 | Status: DISCONTINUED | OUTPATIENT
Start: 2021-07-01 | End: 2021-07-02

## 2021-07-01 RX ORDER — GABAPENTIN 400 MG/1
1800 CAPSULE ORAL AT BEDTIME
Refills: 0 | Status: DISCONTINUED | OUTPATIENT
Start: 2021-07-01 | End: 2021-07-02

## 2021-07-01 RX ORDER — HYDROMORPHONE HYDROCHLORIDE 2 MG/ML
0.5 INJECTION INTRAMUSCULAR; INTRAVENOUS; SUBCUTANEOUS
Refills: 0 | Status: DISCONTINUED | OUTPATIENT
Start: 2021-07-01 | End: 2021-07-01

## 2021-07-01 RX ORDER — CEFAZOLIN SODIUM 1 G
2000 VIAL (EA) INJECTION ONCE
Refills: 0 | Status: COMPLETED | OUTPATIENT
Start: 2021-07-01 | End: 2021-07-01

## 2021-07-01 RX ORDER — CHLORHEXIDINE GLUCONATE 213 G/1000ML
1 SOLUTION TOPICAL ONCE
Refills: 0 | Status: COMPLETED | OUTPATIENT
Start: 2021-07-01 | End: 2021-07-01

## 2021-07-01 RX ORDER — OXYCODONE HYDROCHLORIDE 5 MG/1
5 TABLET ORAL ONCE
Refills: 0 | Status: DISCONTINUED | OUTPATIENT
Start: 2021-07-01 | End: 2021-07-01

## 2021-07-01 RX ORDER — MELOXICAM 15 MG/1
15 TABLET ORAL
Refills: 0 | Status: DISCONTINUED | OUTPATIENT
Start: 2021-07-02 | End: 2021-07-02

## 2021-07-01 RX ORDER — ACETAMINOPHEN 500 MG
1000 TABLET ORAL ONCE
Refills: 0 | Status: COMPLETED | OUTPATIENT
Start: 2021-07-02 | End: 2021-07-02

## 2021-07-01 RX ORDER — ACETAMINOPHEN 500 MG
1000 TABLET ORAL ONCE
Refills: 0 | Status: COMPLETED | OUTPATIENT
Start: 2021-07-01 | End: 2021-07-01

## 2021-07-01 RX ORDER — ONDANSETRON 8 MG/1
4 TABLET, FILM COATED ORAL ONCE
Refills: 0 | Status: DISCONTINUED | OUTPATIENT
Start: 2021-07-01 | End: 2021-07-01

## 2021-07-01 RX ORDER — TRANEXAMIC ACID 100 MG/ML
1000 INJECTION, SOLUTION INTRAVENOUS ONCE
Refills: 0 | Status: COMPLETED | OUTPATIENT
Start: 2021-07-01 | End: 2021-07-01

## 2021-07-01 RX ORDER — CEFAZOLIN SODIUM 1 G
2000 VIAL (EA) INJECTION EVERY 8 HOURS
Refills: 0 | Status: COMPLETED | OUTPATIENT
Start: 2021-07-01 | End: 2021-07-01

## 2021-07-01 RX ORDER — SODIUM CHLORIDE 9 MG/ML
1000 INJECTION, SOLUTION INTRAVENOUS
Refills: 0 | Status: DISCONTINUED | OUTPATIENT
Start: 2021-07-01 | End: 2021-07-01

## 2021-07-01 RX ORDER — LOSARTAN POTASSIUM 100 MG/1
50 TABLET, FILM COATED ORAL DAILY
Refills: 0 | Status: DISCONTINUED | OUTPATIENT
Start: 2021-07-03 | End: 2021-07-02

## 2021-07-01 RX ORDER — APREPITANT 80 MG/1
40 CAPSULE ORAL ONCE
Refills: 0 | Status: COMPLETED | OUTPATIENT
Start: 2021-07-01 | End: 2021-07-01

## 2021-07-01 RX ORDER — BENZOCAINE AND MENTHOL 5; 1 G/100ML; G/100ML
1 LIQUID ORAL ONCE
Refills: 0 | Status: COMPLETED | OUTPATIENT
Start: 2021-07-01 | End: 2021-07-01

## 2021-07-01 RX ORDER — ATORVASTATIN CALCIUM 80 MG/1
20 TABLET, FILM COATED ORAL DAILY
Refills: 0 | Status: DISCONTINUED | OUTPATIENT
Start: 2021-07-01 | End: 2021-07-02

## 2021-07-01 RX ORDER — METOPROLOL TARTRATE 50 MG
50 TABLET ORAL DAILY
Refills: 0 | Status: DISCONTINUED | OUTPATIENT
Start: 2021-07-01 | End: 2021-07-02

## 2021-07-01 RX ORDER — ASPIRIN/CALCIUM CARB/MAGNESIUM 324 MG
81 TABLET ORAL EVERY 12 HOURS
Refills: 0 | Status: DISCONTINUED | OUTPATIENT
Start: 2021-07-02 | End: 2021-07-02

## 2021-07-01 RX ADMIN — APREPITANT 40 MILLIGRAM(S): 80 CAPSULE ORAL at 06:44

## 2021-07-01 RX ADMIN — Medication 1000 MILLIGRAM(S): at 16:30

## 2021-07-01 RX ADMIN — Medication 1000 MILLIGRAM(S): at 21:37

## 2021-07-01 RX ADMIN — BENZOCAINE AND MENTHOL 1 LOZENGE: 5; 1 LIQUID ORAL at 20:23

## 2021-07-01 RX ADMIN — CHLORHEXIDINE GLUCONATE 1 APPLICATION(S): 213 SOLUTION TOPICAL at 06:44

## 2021-07-01 RX ADMIN — Medication 100 MILLIGRAM(S): at 23:24

## 2021-07-01 RX ADMIN — Medication 400 MILLIGRAM(S): at 21:36

## 2021-07-01 RX ADMIN — SODIUM CHLORIDE 1000 MILLILITER(S): 9 INJECTION INTRAMUSCULAR; INTRAVENOUS; SUBCUTANEOUS at 11:45

## 2021-07-01 RX ADMIN — SODIUM CHLORIDE 75 MILLILITER(S): 9 INJECTION, SOLUTION INTRAVENOUS at 15:44

## 2021-07-01 RX ADMIN — SODIUM CHLORIDE 125 MILLILITER(S): 9 INJECTION, SOLUTION INTRAVENOUS at 18:26

## 2021-07-01 RX ADMIN — GABAPENTIN 1800 MILLIGRAM(S): 400 CAPSULE ORAL at 21:36

## 2021-07-01 RX ADMIN — Medication 100 MILLIGRAM(S): at 15:44

## 2021-07-01 RX ADMIN — Medication 400 MILLIGRAM(S): at 16:12

## 2021-07-01 RX ADMIN — SODIUM CHLORIDE 125 MILLILITER(S): 9 INJECTION, SOLUTION INTRAVENOUS at 19:22

## 2021-07-01 NOTE — DISCHARGE NOTE PROVIDER - NSDCCPCAREPLAN_GEN_ALL_CORE_FT
PRINCIPAL DISCHARGE DIAGNOSIS  Diagnosis: Primary osteoarthritis of right hip  Assessment and Plan of Treatment: right anterior MANOLO  Physical Therapy/Occupational Therapy for: Ambulation, transfers, stairs, & ADLs, isometrics.   Full weight bearing on both legs; Walker/cane use as instructed by Physical therapy/Occupational therapy. Anterior Total Hip Replacement precautions for  6 weeks: No straight leg raise; No external rotation of hip when extended-standing or lying flat; No hyperextension of hip when standing (kickback).   Ice packs to hip for 30 min. every 3 hours and after physical therapy.   Keep incision clean and dry. you have a silverlon dressing which iws water resistant, not waterproof.  You may shower and it may get slightly wet, but don't soak it.  You may remove dressing in 7 days. May shower  if no drainage from incision.  Suture removal on/near after Post Op Day # 14 in Surgeon's office.  • Do not take a tub bath yet.   • Do not resume driving until you have your surgeon’s permission.

## 2021-07-01 NOTE — DISCHARGE NOTE PROVIDER - CARE PROVIDER_API CALL
FRANCISCO Andres (MD)  Orthopaedic Surgery  [address], [address]  [place] [address]  Phone: [phone]  Fax: [phone]  Established Patient  Scheduled Appointment: 07/12/2021 09:45 AM

## 2021-07-01 NOTE — DISCHARGE NOTE PROVIDER - PROVIDER TOKENS
PROVIDER:[TOKEN:[[id number]],SCHEDULEDAPPT:[07/12/2021],SCHEDULEDAPPTTIME:[09:45 AM],ESTABLISHEDPATIENT:[T]]

## 2021-07-01 NOTE — DISCHARGE NOTE PROVIDER - INSTRUCTIONS
regular  For Constipation :   • Increase your water intake. Drink at least 8 glasses of water daily.  • Try adding fiber to your diet by eating fruits, vegetables and foods that are rich in grains.  • If you do experience constipation, you may take an over-the-counter stool softener/laxative such as Aleta Colace, Senekot, miralax or  Milk of Magnesia.

## 2021-07-01 NOTE — BRIEF OPERATIVE NOTE - COMMENTS
Implants : acet n58 mm alteon cluster cup w/ 0 degree xle vit E liner, femur #10 extended offset collarless alteon HA stem w/ 36+0 biolox head Implants : acet 58 mm alteon cluster cup w/ 0 degree xle vit E liner, femur #10 extended offset collarless alteon HA stem w/ 36+0 biolox head

## 2021-07-01 NOTE — DISCHARGE NOTE PROVIDER - NSDCMRMEDTOKEN_GEN_ALL_CORE_FT
gabapentin 600 mg oral tablet: 3  orally once a day (at bedtime)  levothyroxine 150 mcg (0.15 mg) oral tablet: 1 tab(s) orally once a day  losartan 50 mg oral tablet: 1 tab(s) orally once a day  metoprolol succinate 50 mg oral tablet, extended release: 1 tab(s) orally once a day  nitrofurantoin macrocrystals 50 mg oral capsule:   pravastatin 40 mg oral tablet: 1 tab(s) orally once a day  Yuvafem 10 mcg vaginal tablet: 1 tab(s) vaginal 2 times a week  zolpidem 10 mg oral tablet: 1 tab(s) orally once a day (at bedtime)  Zomig 5 mg oral tablet:    acetaminophen 500 mg oral tablet: 2 tab(s) orally every 8 hours  aspirin 81 mg oral delayed release tablet: 1 tab(s) orally every 12 hours.  Take 2 hrs before meloxicam  gabapentin 600 mg oral tablet: 3  orally once a day (at bedtime)  levothyroxine 150 mcg (0.15 mg) oral tablet: 1 tab(s) orally once a day  losartan 50 mg oral tablet: 1 tab(s) orally once a day  meloxicam 15 mg oral tablet: 1 tab(s) orally once a day .  Take 2 hrs after ecotrin  metoprolol succinate 50 mg oral tablet, extended release: 1 tab(s) orally once a day  omeprazole 20 mg oral delayed release capsule: 1 cap(s) orally once a day  before breakfast.   oxyCODONE 5 mg oral tablet: 1-2 tab(s) orally every 4 hours, As Needed -Moderate to severe pain MDD:6   [id number]  polyethylene glycol 3350 oral powder for reconstitution: 17 gram(s) orally once a day (at bedtime)  pravastatin 40 mg oral tablet: 1 tab(s) orally once a day  senna oral tablet: 2 tab(s) orally once a day (at bedtime)  Yuvafem 10 mcg vaginal tablet: 1 tab(s) vaginal 2 times a week. resume in 6 weeks  zolpidem 10 mg oral tablet: 1 tab(s) orally once a day (at bedtime)  Zomig 5 mg oral tablet:    acetaminophen 500 mg oral tablet: 2 tab(s) orally every 8 hours  aspirin 81 mg oral delayed release tablet: 1 tab(s) orally every 12 hours.  Take 2 hrs before meloxicam  gabapentin 600 mg oral tablet: 3  orally once a day (at bedtime)  levothyroxine 150 mcg (0.15 mg) oral tablet: 1 tab(s) orally once a day  losartan 50 mg oral tablet: 1 tab(s) orally once a day  meloxicam 15 mg oral tablet: 1 tab(s) orally once a day .  Take 2 hrs after ecotrin  metoprolol succinate 50 mg oral tablet, extended release: 1 tab(s) orally once a day  omeprazole 20 mg oral delayed release capsule: 1 cap(s) orally once a day  before breakfast.   oxyCODONE 5 mg oral tablet: 1-2 tab(s) orally every 4 hours, As Needed -Moderate to severe pain MDD:6   [id number]  polyethylene glycol 3350 oral powder for reconstitution: 17 gram(s) orally once a day (at bedtime)  pravastatin 80 mg oral tablet: 1 tab(s) orally once a day  senna oral tablet: 2 tab(s) orally once a day (at bedtime)  Yuvafem 10 mcg vaginal tablet: 1 tab(s) vaginal 2 times a week. resume in 6 weeks  zolpidem 10 mg oral tablet: 1 tab(s) orally once a day (at bedtime)  Zomig 5 mg oral tablet:

## 2021-07-01 NOTE — DISCHARGE NOTE PROVIDER - HOSPITAL COURSE
The patient is a 61 y.o. female with severe osteoarthritis of the right hip.  She was evaluated by the PST dept and obtained the appropriate medical clearances.  After admission on 07/01/21 and receiving pre-operative parenteral prophylactic antibiotics (ancef), the patient  underwent an  uncomplicated right anterior MANOLO by orthopedic surgeon Dr. Yasir Andres.    A medical consultation from the Hospitalist service was obtained for post-operative medical co-management. Typical Physical & occupational therapy modalities post anterior MANOLO were performed including ambulation training, range of motion, ADL's, and transfers. Ecotrin 81 mg every 12 hrs, along w/ bilat venodynes, was given for DVT prophylaxis (caprini 9).  The patient had a clean appearing surgical incision with no sign of surgical site infections and had a stable neuro / vascular exam of the operated extremity.  After progression of mobility guided by the PT/ OT staff,  the patient was felt to benefit from further rehabilitative care for restoration to level of function. This was felt to best be accomplished at home.  Discharge and Orthopedic Care instructions were delineated in the Discharge Plan and reviewed with the patient. All medications were delineated in the medication reconciliation tool and key points were reviewed with the patient. They were deemed stable from an Orthopedic & medical standpoint for discharge today.  Upon completion of Home care, she will be  following up with Dr. Andres for office  follow up Orthopedic care.

## 2021-07-01 NOTE — DISCHARGE NOTE PROVIDER - NSDCFUADDAPPT_GEN_ALL_CORE_FT
Please follow up w/ Dr Andres at [facility].  It is advisable to follow up w/ your pcp  in 2-3 weeks to be sure there are no underlying problems.

## 2021-07-01 NOTE — CONSULT NOTE ADULT - PROBLEM SELECTOR RECOMMENDATION 9
Pt is s/p right total hip preplacement, tolerated procedure well without any major complications  Pain control as per ortho team - dilaudid, oyxcodone, acetaminophen, meloxicam, gabapentin  Bowel regiment while on opioids - dulcolax and senna  Zofran prn nausea  PT eval  Cont to f/u ortho recs

## 2021-07-02 ENCOUNTER — TRANSCRIPTION ENCOUNTER (OUTPATIENT)
Age: 61
End: 2021-07-02

## 2021-07-02 VITALS
DIASTOLIC BLOOD PRESSURE: 72 MMHG | RESPIRATION RATE: 16 BRPM | SYSTOLIC BLOOD PRESSURE: 114 MMHG | OXYGEN SATURATION: 99 % | HEART RATE: 64 BPM | TEMPERATURE: 99 F

## 2021-07-02 LAB
ANION GAP SERPL CALC-SCNC: 6 MMOL/L — SIGNIFICANT CHANGE UP (ref 5–17)
BUN SERPL-MCNC: 8 MG/DL — SIGNIFICANT CHANGE UP (ref 7–23)
CALCIUM SERPL-MCNC: 8.8 MG/DL — SIGNIFICANT CHANGE UP (ref 8.4–10.5)
CHLORIDE SERPL-SCNC: 106 MMOL/L — SIGNIFICANT CHANGE UP (ref 96–108)
CO2 SERPL-SCNC: 26 MMOL/L — SIGNIFICANT CHANGE UP (ref 22–31)
CREAT SERPL-MCNC: 0.79 MG/DL — SIGNIFICANT CHANGE UP (ref 0.5–1.3)
GLUCOSE SERPL-MCNC: 97 MG/DL — SIGNIFICANT CHANGE UP (ref 70–99)
HCT VFR BLD CALC: 29.5 % — LOW (ref 34.5–45)
HGB BLD-MCNC: 9.6 G/DL — LOW (ref 11.5–15.5)
MCHC RBC-ENTMCNC: 31 PG — SIGNIFICANT CHANGE UP (ref 27–34)
MCHC RBC-ENTMCNC: 32.5 GM/DL — SIGNIFICANT CHANGE UP (ref 32–36)
MCV RBC AUTO: 95.2 FL — SIGNIFICANT CHANGE UP (ref 80–100)
NRBC # BLD: 0 /100 WBCS — SIGNIFICANT CHANGE UP (ref 0–0)
PLATELET # BLD AUTO: 197 K/UL — SIGNIFICANT CHANGE UP (ref 150–400)
POTASSIUM SERPL-MCNC: 3.5 MMOL/L — SIGNIFICANT CHANGE UP (ref 3.5–5.3)
POTASSIUM SERPL-SCNC: 3.5 MMOL/L — SIGNIFICANT CHANGE UP (ref 3.5–5.3)
RBC # BLD: 3.1 M/UL — LOW (ref 3.8–5.2)
RBC # FLD: 13.9 % — SIGNIFICANT CHANGE UP (ref 10.3–14.5)
SODIUM SERPL-SCNC: 138 MMOL/L — SIGNIFICANT CHANGE UP (ref 135–145)
WBC # BLD: 7.43 K/UL — SIGNIFICANT CHANGE UP (ref 3.8–10.5)
WBC # FLD AUTO: 7.43 K/UL — SIGNIFICANT CHANGE UP (ref 3.8–10.5)

## 2021-07-02 PROCEDURE — C1776: CPT

## 2021-07-02 PROCEDURE — 97530 THERAPEUTIC ACTIVITIES: CPT

## 2021-07-02 PROCEDURE — 99238 HOSP IP/OBS DSCHRG MGMT 30/<: CPT

## 2021-07-02 PROCEDURE — 97165 OT EVAL LOW COMPLEX 30 MIN: CPT

## 2021-07-02 PROCEDURE — 94664 DEMO&/EVAL PT USE INHALER: CPT

## 2021-07-02 PROCEDURE — 88311 DECALCIFY TISSUE: CPT

## 2021-07-02 PROCEDURE — 76000 FLUOROSCOPY <1 HR PHYS/QHP: CPT

## 2021-07-02 PROCEDURE — 97116 GAIT TRAINING THERAPY: CPT

## 2021-07-02 PROCEDURE — 88305 TISSUE EXAM BY PATHOLOGIST: CPT

## 2021-07-02 PROCEDURE — 85027 COMPLETE CBC AUTOMATED: CPT

## 2021-07-02 PROCEDURE — 97161 PT EVAL LOW COMPLEX 20 MIN: CPT

## 2021-07-02 PROCEDURE — 80048 BASIC METABOLIC PNL TOTAL CA: CPT

## 2021-07-02 PROCEDURE — 97535 SELF CARE MNGMENT TRAINING: CPT

## 2021-07-02 PROCEDURE — C1889: CPT

## 2021-07-02 PROCEDURE — 36415 COLL VENOUS BLD VENIPUNCTURE: CPT

## 2021-07-02 RX ORDER — POLYETHYLENE GLYCOL 3350 17 G/17G
17 POWDER, FOR SOLUTION ORAL
Qty: 0 | Refills: 0 | DISCHARGE
Start: 2021-07-02

## 2021-07-02 RX ORDER — ACETAMINOPHEN 500 MG
2 TABLET ORAL
Qty: 0 | Refills: 0 | DISCHARGE
Start: 2021-07-02

## 2021-07-02 RX ORDER — OMEPRAZOLE 10 MG/1
1 CAPSULE, DELAYED RELEASE ORAL
Qty: 30 | Refills: 0
Start: 2021-07-02 | End: 2021-07-31

## 2021-07-02 RX ORDER — OXYCODONE HYDROCHLORIDE 5 MG/1
1 TABLET ORAL
Qty: 42 | Refills: 0
Start: 2021-07-02 | End: 2021-07-08

## 2021-07-02 RX ORDER — ASPIRIN/CALCIUM CARB/MAGNESIUM 324 MG
1 TABLET ORAL
Qty: 84 | Refills: 0
Start: 2021-07-02 | End: 2021-08-12

## 2021-07-02 RX ORDER — NITROFURANTOIN MACROCRYSTAL 50 MG
0 CAPSULE ORAL
Qty: 0 | Refills: 0 | DISCHARGE

## 2021-07-02 RX ORDER — MELOXICAM 15 MG/1
1 TABLET ORAL
Qty: 30 | Refills: 0
Start: 2021-07-02 | End: 2021-07-31

## 2021-07-02 RX ORDER — ESTRADIOL 4 UG/1
1 INSERT VAGINAL
Qty: 0 | Refills: 0 | DISCHARGE

## 2021-07-02 RX ORDER — SENNA PLUS 8.6 MG/1
2 TABLET ORAL
Qty: 0 | Refills: 0 | DISCHARGE
Start: 2021-07-02

## 2021-07-02 RX ADMIN — Medication 400 MILLIGRAM(S): at 04:56

## 2021-07-02 RX ADMIN — Medication 81 MILLIGRAM(S): at 06:17

## 2021-07-02 RX ADMIN — MELOXICAM 15 MILLIGRAM(S): 15 TABLET ORAL at 09:24

## 2021-07-02 RX ADMIN — PANTOPRAZOLE SODIUM 40 MILLIGRAM(S): 20 TABLET, DELAYED RELEASE ORAL at 06:17

## 2021-07-02 RX ADMIN — Medication 50 MILLIGRAM(S): at 06:17

## 2021-07-02 RX ADMIN — Medication 150 MICROGRAM(S): at 05:02

## 2021-07-02 RX ADMIN — Medication 1000 MILLIGRAM(S): at 10:26

## 2021-07-02 RX ADMIN — Medication 1000 MILLIGRAM(S): at 10:27

## 2021-07-02 RX ADMIN — Medication 1000 MILLIGRAM(S): at 05:05

## 2021-07-02 NOTE — DISCHARGE NOTE NURSING/CASE MANAGEMENT/SOCIAL WORK - PATIENT PORTAL LINK FT
You can access the FollowMyHealth Patient Portal offered by [facility] by registering at the following website: [email or web address]. By joining [employer]’s FollowMyHealth portal, you will also be able to view your health information using other applications (apps) compatible with our system.

## 2021-07-02 NOTE — PROGRESS NOTE ADULT - PROBLEM SELECTOR PLAN 1
Pain Management: acceptable- continue current care Tylenol ATC/ Meloxicam ATC/ Oxycodone PRN  Continue PT/OT  DVT proph: [ x ] low risk - Aspirin  [  ] high risk -Lovenox [  ] high risk - Eliquis [  ] other:_________  DC plan:  [ x ] Home with HC -medically cleared for today

## 2021-07-02 NOTE — ANESTHESIA FOLLOW-UP NOTE - NSCONDITION_GEN_ALL_CORE
"----- Message from Yaneli Sumner sent at 1/8/2020  4:24 PM CST -----  Contact: Joseluis   Consult/Advisory:    Name Of Caller: Joseluis   Provider Name: Ellie Santos MD  Does patient feel the need to be seen today? Unclear   Relationship to the Pt?: self   Contact Preference?: [phone]  What is the nature of the call?:  - pt called in to schedule appt with MD, experiencing stomach pains.   Please contact and advise     Additional Notes:  "Thank you for all that you do for our patients'"      "
Returned call to number given for patient.  Number is for a Nursing Home, [facility]. Spoke with staff member who stated no one called our office. She requested for pt to come to phone. Pt did not answer phone. Unable to speak with patient directly.  
Stable

## 2021-07-02 NOTE — DISCHARGE NOTE NURSING/CASE MANAGEMENT/SOCIAL WORK - NSDCFUADDAPPT_GEN_ALL_CORE_FT
Please follow up w/ Dr Andres at [facility].  It is advisable to follow up w/ your pcp  in 2-3 weeks to be sure there are no underlying problems.

## 2021-07-02 NOTE — PROGRESS NOTE ADULT - SUBJECTIVE AND OBJECTIVE BOX
POST OPERATIVE DAY #: 0    61y Female  s/p   Right Anterior THR                        SUBJECTIVE: Patient seen and examined at bedside.     Pain:  well controlled   Pain scale:  3 /10  Denies CP, SOB, N/V/D, weakness, numbness   No new complains     OBJECTIVE:     Vital Signs Last 24 Hrs  T(C): 36.7 (01 Jul 2021 11:15), Max: 36.7 (01 Jul 2021 11:15)  T(F): 98 (01 Jul 2021 11:15), Max: 98 (01 Jul 2021 11:15)  HR: 63 (01 Jul 2021 13:30) (61 - 67)  BP: 99/65 (01 Jul 2021 13:30) (86/46 - 136/87)  BP(mean): --  RR: 16 (01 Jul 2021 13:30) (10 - 18)  SpO2: 100% (01 Jul 2021 13:30) (98% - 100%)    Affected extremity: RLE/ LLE         Dressing: silverlon,clean/dry/intact                     HV intact, on self suction         Sensation: intact to light touch          Motor exam:   5/ 5 Tibialis Anterior/Gastrocnemius-Soleus, EHL/FHL         warm, well-perfused; capillary refill < 3 seconds         negative calf tenderness B/L LE    LABS: pending     I&O's Detail    01 Jul 2021 07:01  -  01 Jul 2021 14:36  --------------------------------------------------------  IN:    Lactated Ringers: 2000 mL    Oral Fluid: 960 mL    Sodium Chloride 0.9% Bolus: 500 mL  Total IN: 3460 mL    OUT:  Total OUT: 0 mL    Total NET: 3460 mL    MEDICATIONS:    Pain management:  HYDROmorphone  Injectable 0.5 milliGRAM(s) IV Push every 10 minutes PRN  ondansetron Injectable 4 milliGRAM(s) IV Push once PRN  oxyCODONE    IR 5 milliGRAM(s) Oral once PRN    DVT prophylaxis: Eliquis      RADIOLOGY & ADDITIONAL STUDIES:    ASSESSMENT AND PLAN:     - Analgesic pain control  - DVT prophylaxis:  Eliquis2.5mg twice a day SCDs       - Antibiotics:  Ancef for 24 hours   - Pain Management: Meloxicam 15mg daily x 21 days   - PT/OT: Weight Bearing Status:  Weight bearing as tolerated, OOBTC           Anterior Hip precautions    -  Incentive spirometry  - Advance diet as tolerated  - Hospitalist is following  - Follow up HV output  -  Follow up labs  -  Disposition: Home       
Procedure: RightAnterior THR  POD#: 1    S: Pt without complaints. No SOB,CP, N/V. Tolerated Fluids / Diet well.   Pain comfortable on Interval Rx  + Tylenol + meloxicam.  Has not yet taken oxy. No BM yet  + flatus, No abdominal pain.  Pain Rx:   acetaminophen   Tablet .. 1000 milliGRAM(s) Oral every 8 hours  gabapentin 1800 milliGRAM(s) Oral at bedtime  HYDROmorphone  Injectable 0.5 milliGRAM(s) IV Push every 3 hours PRN  meloxicam 15 milliGRAM(s) Oral <User Schedule>  ondansetron Injectable 4 milliGRAM(s) IV Push every 6 hours PRN  oxyCODONE    IR 5 milliGRAM(s) Oral every 3 hours PRN  oxyCODONE    IR 10 milliGRAM(s) Oral every 3 hours PRN  zolpidem 5 milliGRAM(s) Oral at bedtime PRN  zolpidem 5 milliGRAM(s) Oral at bedtime PRN    O: General: Pt Alert and oriented, On exam NAD,   VS: Vital Signs Last 24 Hrs  T(C): 36.6 (02 Jul 2021 08:22), Max: 37.4 (01 Jul 2021 23:22)  T(F): 97.8 (02 Jul 2021 08:22), Max: 99.3 (01 Jul 2021 23:22)  HR: 62 (02 Jul 2021 08:22) (56 - 74)  BP: 109/60 (02 Jul 2021 08:22) (86/46 - 148/88)  BP(mean): --  RR: 16 (02 Jul 2021 08:22) (14 - 18)  SpO2: 98% (02 Jul 2021 08:22) (95% - 100%)  Heart: RRR  Lungs: BS clear bilat.  Abdomen: Soft; no distention, benign exam    Ext: Right Ant. Hip: silverlon w/ some bloody drainage, changed.  hemovac d/c'ed--pt tolerated well.  Neurologic: Has sensation bilat. feet & toes ;  Full AROM bilat feet & toes. EHL / AT  = Bilat: 5/5 ; Sensation Present mid lateral thigh  Vascular: Feet toes warm, pink. DP = 2+. Calves soft ; w/o tenderness bilat..  VTEP: On Bilat. Venodynes +   aspirin enteric coated 81 milliGRAM(s) Oral every 12 hours    H.O Prophylaxis: Celebrex 200mg BID - Goal 21 Days   Activity in PT Noted : walked 150'                          9.6    7.43  )-----------( 197      ( 02 Jul 2021 07:53 )             29.5     07-02    138  |  106  |  8   ----------------------------<  97  3.5   |  26  |  0.79    Ca    8.8      02 Jul 2021 07:53        Hospitalist input noted    Primary Orthopedic Assessment:  • Stable from Orthopedic perspective  • Neuro motor exam stable:   • Labs: Post op anemia well tolerated      Plan:   • Continue:  PT/OT/Weightbearing as tolerated with assistance of a walker/Anterior THR precautions/Ice to hip/          Incentive spirometry encouraged / Celebrex for HO PPX  • Continue DVT prophylaxis as prescribed, including use of compression devices and ankle pumps  • Continue Pain Rx  • Anterior hip precautions reviewed with patient  • Plans per Medicine   • Discharge planning – anticipated discharge is Home D/C with home care & home PT  when medically stable & cleared by PT/OT--today    
Patient is a 61y old  Female who presents with a chief complaint of right hip pain--for right ant MANOLO (02 Jul 2021 08:27)    INTERVAL HPI/OVERNIGHT EVENTS: feeling well, pain controlled, no complaints.     MEDICATIONS  (STANDING):  acetaminophen   Tablet .. 1000 milliGRAM(s) Oral every 8 hours  aspirin enteric coated 81 milliGRAM(s) Oral every 12 hours  atorvastatin 20 milliGRAM(s) Oral daily  gabapentin 1800 milliGRAM(s) Oral at bedtime  lactated ringers. 1000 milliLiter(s) (125 mL/Hr) IV Continuous <Continuous>  levothyroxine 150 MICROGram(s) Oral daily  meloxicam 15 milliGRAM(s) Oral <User Schedule>  metoprolol succinate ER 50 milliGRAM(s) Oral daily  pantoprazole    Tablet 40 milliGRAM(s) Oral before breakfast  polyethylene glycol 3350 17 Gram(s) Oral at bedtime  senna 2 Tablet(s) Oral at bedtime    MEDICATIONS  (PRN):  HYDROmorphone  Injectable 0.5 milliGRAM(s) IV Push every 3 hours PRN breakthrough pain  magnesium hydroxide Suspension 30 milliLiter(s) Oral daily PRN Constipation  ondansetron Injectable 4 milliGRAM(s) IV Push every 6 hours PRN Nausea and/or Vomiting  oxyCODONE    IR 5 milliGRAM(s) Oral every 3 hours PRN Moderate Pain (4 - 6)  oxyCODONE    IR 10 milliGRAM(s) Oral every 3 hours PRN Severe Pain (7 - 10)  zolpidem 5 milliGRAM(s) Oral at bedtime PRN Insomnia  zolpidem 5 milliGRAM(s) Oral at bedtime PRN Insomnia      Allergies  clindamycin (Rash)  strawberry (Rash)  sulfa drugs (Unknown)  Sulfur (Rash)    Intolerances  ACE inhibitors (Other)      REVIEW OF SYSTEMS:  CONSTITUTIONAL: No fever, weight loss, or fatigue  EYES: No eye pain, visual disturbances, or discharge  ENMT:  No difficulty hearing, tinnitus, vertigo; No sinus or throat pain  NECK: No pain or stiffness  BREASTS: No pain, masses, or nipple discharge  RESPIRATORY: No cough, wheezing, chills or hemoptysis; No shortness of breath  CARDIOVASCULAR: No chest pain, palpitations, or lightheadedness  GASTROINTESTINAL: No abdominal or epigastric pain. No nausea, vomiting, or hematemesis; No diarrhea or constipation. No melena or hematochezia.  GENITOURINARY: No dysuria, frequency, hematuria, or incontinence  NEUROLOGICAL: No headaches, vertigo, memory loss, loss of strength, numbness, or tremors  SKIN: No itching, burning, rashes, or lesions   LYMPH NODES: No enlarged glands  ENDOCRINE: No heat or cold intolerance; No hair loss; No polydipsia or polyuria  MUSCULOSKELETAL: No back pain  PSYCHIATRIC: No depression, anxiety, or mood swings  HEME/LYMPH: No easy bruising, or bleeding gums  ALLERGY AND IMMUNOLOGIC: No hives or eczema    Vital Signs Last 24 Hrs  T(C): 37.1 (02 Jul 2021 11:45), Max: 37.4 (01 Jul 2021 23:22)  T(F): 98.8 (02 Jul 2021 11:45), Max: 99.3 (01 Jul 2021 23:22)  HR: 64 (02 Jul 2021 11:45) (56 - 74)  BP: 114/72 (02 Jul 2021 11:45) (95/53 - 150/88)  BP(mean): --  RR: 16 (02 Jul 2021 11:45) (14 - 16)  SpO2: 99% (02 Jul 2021 11:45) (95% - 100%)    PHYSICAL EXAM:  GENERAL: NAD, well-groomed, well-developed  HEAD:  Atraumatic, Normocephalic  EYES:  conjunctiva and sclera clear  ENMT: Moist mucous membranes  NECK: Supple, No JVD  NERVOUS SYSTEM:  Alert & Oriented X3, Good concentration; Bilateral LE mobile, sensation to light touch intact  CHEST/LUNG: Clear to auscultation bilaterally; No rales, rhonchi, wheezing, or rubs  HEART: Regular rate and rhythm; No murmurs, rubs, or gallops  ABDOMEN: Soft, Nontender, Nondistended; Bowel sounds present  EXTREMITIES:  2+ Peripheral Pulses, No clubbing or cyanosis  LYMPH: No lymphadenopathy noted  SKIN: No rashes or lesions  INCISION:  Dressing dry and intact    LABS:                        9.6    7.43  )-----------( 197      ( 02 Jul 2021 07:53 )             29.5     02 Jul 2021 07:53    138    |  106    |  8      ----------------------------<  97     3.5     |  26     |  0.79     Ca    8.8        02 Jul 2021 07:53          CAPILLARY BLOOD GLUCOSE          RADIOLOGY & ADDITIONAL TESTS:    Imaging Personally Reviewed:      [ ] Consultant(s) Notes Reviewed  [x] Care Discussed with Consultants/Other Providers:  Ortho PA- plan of care

## 2021-07-02 NOTE — DISCHARGE NOTE NURSING/CASE MANAGEMENT/SOCIAL WORK - NSSCNAMETXT_GEN_ALL_CORE
[facility] (formerly [facility])   [address] [place] [address]

## 2021-07-02 NOTE — DISCHARGE NOTE NURSING/CASE MANAGEMENT/SOCIAL WORK - NSSCTYPOFSERV_GEN_ALL_CORE
Physical Therapist to visit the day after hospital discharge; Registered Nurse to follow if there is a need. Please contact the home care agency at the above phone number if you have not heard from them by 12 noon on the day after your hospital discharge

## 2021-07-07 ENCOUNTER — NON-APPOINTMENT (OUTPATIENT)
Age: 61
End: 2021-07-07

## 2021-07-12 ENCOUNTER — APPOINTMENT (OUTPATIENT)
Dept: ORTHOPEDIC SURGERY | Facility: CLINIC | Age: 61
End: 2021-07-12
Payer: COMMERCIAL

## 2021-07-12 VITALS — HEIGHT: 70 IN | BODY MASS INDEX: 26.92 KG/M2 | WEIGHT: 188 LBS

## 2021-07-12 PROBLEM — Z87.440 PERSONAL HISTORY OF URINARY (TRACT) INFECTIONS: Chronic | Status: ACTIVE | Noted: 2021-06-17

## 2021-07-12 PROBLEM — Z92.29 PERSONAL HISTORY OF OTHER DRUG THERAPY: Chronic | Status: ACTIVE | Noted: 2021-06-17

## 2021-07-12 PROCEDURE — 73502 X-RAY EXAM HIP UNI 2-3 VIEWS: CPT | Mod: RT

## 2021-07-12 PROCEDURE — 99024 POSTOP FOLLOW-UP VISIT: CPT

## 2021-07-12 NOTE — CONSULT LETTER
[Dear  ___] : Dear  [unfilled], [Consult Letter:] : I had the pleasure of evaluating your patient, [unfilled]. [Consult Closing:] : Thank you very much for allowing me to participate in the care of this patient.  If you have any questions, please do not hesitate to contact me. [Sincerely,] : Sincerely, [FreeTextEntry2] : IQRA BRAR [FreeTextEntry3] : Yasir Andres MD, FAAOS\par Total Hip and Total Knee Replacement \par Anterior Total Hip Replacement\par [facility]\par [address]\par [place], [place] \par [phone]\par fax [phone]\par

## 2021-07-12 NOTE — HISTORY OF PRESENT ILLNESS
[de-identified] : Right total hip replacement July 1, 2021 [de-identified] : Reports marked decrease in preoperative right hip pain.  Ambulating with a cane.  Denies fever chills or neurovascular symptoms [de-identified] : Well-nourished no distress\par Right hip Trendelenburg gait leg length equal incision healed sutures intact passive motion satisfactory pain-free [de-identified] : X-ray AP pelvis right hip AP lateral demonstrate satisfactory alignment total hip components [de-identified] : Right total hip replacement [de-identified] : Suture removal, physical therapy, follow-up 1 month

## 2021-08-12 ENCOUNTER — EMERGENCY (EMERGENCY)
Facility: HOSPITAL | Age: 61
LOS: 1 days | Discharge: HOME | End: 2021-08-12
Attending: EMERGENCY MEDICINE | Admitting: EMERGENCY MEDICINE
Payer: COMMERCIAL

## 2021-08-12 VITALS
HEIGHT: 70 IN | OXYGEN SATURATION: 99 % | TEMPERATURE: 99 F | DIASTOLIC BLOOD PRESSURE: 96 MMHG | WEIGHT: 188.05 LBS | SYSTOLIC BLOOD PRESSURE: 167 MMHG | HEART RATE: 92 BPM | RESPIRATION RATE: 18 BRPM

## 2021-08-12 DIAGNOSIS — N81.9 FEMALE GENITAL PROLAPSE, UNSPECIFIED: Chronic | ICD-10-CM

## 2021-08-12 DIAGNOSIS — Z96.642 PRESENCE OF LEFT ARTIFICIAL HIP JOINT: Chronic | ICD-10-CM

## 2021-08-12 DIAGNOSIS — Z90.710 ACQUIRED ABSENCE OF BOTH CERVIX AND UTERUS: Chronic | ICD-10-CM

## 2021-08-12 PROCEDURE — 99284 EMERGENCY DEPT VISIT MOD MDM: CPT

## 2021-08-12 PROCEDURE — 93971 EXTREMITY STUDY: CPT

## 2021-08-12 PROCEDURE — 93971 EXTREMITY STUDY: CPT | Mod: 26,LT

## 2021-08-12 PROCEDURE — 99284 EMERGENCY DEPT VISIT MOD MDM: CPT | Mod: 25

## 2021-08-12 NOTE — ED PROVIDER NOTE - CARE PROVIDER_API CALL
YOUR PMD,   Phone: (   )    -  Fax: (   )    -  Follow Up Time: 1-3 Days    FRANCISCO Andres)  Orthopaedic Surgery  [address] [place] [address]  Phone: [phone]  Fax: [phone]  Follow Up Time: 1-3 Days

## 2021-08-12 NOTE — ED ADULT TRIAGE NOTE - CHIEF COMPLAINT QUOTE
" Dr Donnelly sent me here, he said I need surgery, I have inflammation in my gall bladder " " My left leg/ ankle is swollen since yesterday, it was sore x 1 week, now more pain "

## 2021-08-12 NOTE — ED PROVIDER NOTE - PATIENT PORTAL LINK FT
You can access the FollowMyHealth Patient Portal offered by [facility] by registering at the following website: [email or web address]. By joining [employer]’s FollowMyHealth portal, you will also be able to view your health information using other applications (apps) compatible with our system.

## 2021-08-12 NOTE — ED PROVIDER NOTE - PROVIDER TOKENS
FREE:[LAST:[YOUR PMD],PHONE:[(   )    -],FAX:[(   )    -],FOLLOWUP:[1-3 Days]],PROVIDER:[TOKEN:[[id number]],FOLLOWUP:[1-3 Days]]

## 2021-08-12 NOTE — ED PROVIDER NOTE - OBJECTIVE STATEMENT
62 y/o F with no pmhx presents with c/o L ankle swelling x 3 days. Pt states that she is s/p R hip replacement on 7/1/2021 by Dr. Andres. Pt has been on asa 81mg BID since. States that she has had mild soreness around her L ankle for a while, but noticed mild swelling to her L lower leg with some pain 3 days ago. Pt is concerned about DVT. Pt had recent travel to [place] as well. Denies trauma, CP, SOB, numbness, tingling, calf pain, hx of dvt, fever or other symptoms.

## 2021-08-12 NOTE — ED PROVIDER NOTE - NSICDXPASTMEDICALHX_GEN_ALL_CORE_FT
PAST MEDICAL HISTORY:  Bulging lumbar disc L4-5; treated with PINA (last in June 2013)    Cervical disc disease herniated discs; unsure levels (asymptomatic)    Cluster headache     COVID-19 vaccine series completed moderna 4/2/21    Diverticulosis asymptomatic    Esophageal spasm endoscopy and colonoscopy negative; no reflux    History of recurrent UTIs stable since colpopexy    HTN (hypertension)     Hyperlipemia     Hypothyroid     Osteoarthritis hip

## 2021-08-12 NOTE — ED PROVIDER NOTE - CLINICAL SUMMARY MEDICAL DECISION MAKING FREE TEXT BOX
62 y/o F with no pmhx presents with c/o L ankle swelling x 3 days. Pt states that she is s/p R hip replacement on 7/1/2021 by Dr. Andres. Pt has been on asa 81mg BID since. States that she has had mild soreness around her L ankle for a while, but noticed mild swelling to her L lower leg with some pain 3 days ago. Pt is concerned about DVT. Pt had recent travel to [place] as well. Denies trauma, CP, SOB, numbness, tingling, calf pain, hx of dvt, fever or other symptoms. PE: as above; will r/o dvt; will get LLE US, pt declined xrays at this time, ace wrap, nsaids, f/u ortho

## 2021-08-12 NOTE — ED PROVIDER NOTE - NSFOLLOWUPINSTRUCTIONS_ED_ALL_ED_FT
Follow up with your Orthopedist in 1-2 days for re-evaluation, ongoing care and treatment. Rest, elevate leg, ace wrap for compression, motrin or advil as directed for pain. If having worsening of symptoms or other related symptoms, RETURN TO THE ER IMMEDIATELY.

## 2021-08-12 NOTE — ED PROVIDER NOTE - MUSCULOSKELETAL, MLM
Spine appears normal, range of motion is not limited, no muscle or joint tenderness; LLE: +mild swelling noted to ankle/foot L, no calf swelling noted, calf NT, no erythema or increased warmth noted, no cords noted, neg homanns sign, toes warm & mobile, nl cap refill, pulses and sensation intact, NVI

## 2021-08-12 NOTE — ED ADULT NURSE NOTE - OBJECTIVE STATEMENT
60 y/o female received aox4 ambulatory c/o bilateral lower extremity swelling and pain x 5 days. denies trauma or any other recent issues.

## 2021-08-12 NOTE — ED PROVIDER NOTE - NSICDXPASTSURGICALHX_GEN_ALL_CORE_FT
PAST SURGICAL HISTORY:  History of total left hip replacement 2014    Normal vaginal delivery x 3    Pelvic prolapse laparoscopic colpopexy 2019    S/P hysterectomy 2004 after prolapse

## 2021-08-12 NOTE — ED PROVIDER NOTE - PROGRESS NOTE DETAILS
Christiano RIVERA for ED attending, Dr. Brito: 60 y/o F w/ PMHx of HTN, HLD, hypothyriod, OA, and diverticulosis presents to ED c/o foot pain and swelling. Pt had R hip replacement. Pt is on ASA and Metrazol since surgery. PCP: Dr. Willett Ortho: Dr. Andres. On exam vital signs normal, afebrile and in no distress, L lower leg minor swelling and tenderness, there is no posterior calf tenderness or swelling, chords or homans, FROM pulses and sensation intact. Impression is L ankle swelling plan r/o DVT, venous doppler, pt refused XR may need ace warp and NSAIDs and ortho f/u. Pt declined xrays of foot/ankle at this time in ED, US LLE neg, will d/c home with ace wrap, advised nsaids, f/u ortho.  Reevaluated patient at bedside.  Patient feeling much improved.  Discussed the results of all diagnostic testing in ED and copies of all reports given.   An opportunity to ask questions was given.  Discussed the importance of prompt, close medical follow-up.  Patient will return with any changes, concerns or persistent / worsening symptoms.  Understanding of all instructions verbalized.

## 2021-08-13 ENCOUNTER — NON-APPOINTMENT (OUTPATIENT)
Age: 61
End: 2021-08-13

## 2021-08-16 ENCOUNTER — APPOINTMENT (OUTPATIENT)
Dept: ORTHOPEDIC SURGERY | Facility: CLINIC | Age: 61
End: 2021-08-16
Payer: COMMERCIAL

## 2021-08-16 VITALS — BODY MASS INDEX: 26.92 KG/M2 | WEIGHT: 188 LBS | HEIGHT: 70 IN

## 2021-08-16 PROCEDURE — 99024 POSTOP FOLLOW-UP VISIT: CPT

## 2021-08-16 NOTE — HISTORY OF PRESENT ILLNESS
[de-identified] : Right total hip replacement July 1, 2021 [de-identified] : Reports marked decrease in preoperative right hip pain.  Ambulating independently.  Denies fever chills or neurovascular symptoms [de-identified] : Well-nourished no distress\par Right hip satisfactory gait leg length equal passive motion satisfactory pain-free [de-identified] : Right total hip replacement [de-identified] : Home exercise program follow-up 6 months

## 2021-08-16 NOTE — HISTORY OF PRESENT ILLNESS
[de-identified] : Right total hip replacement July 1, 2021 [de-identified] : Reports marked decrease in preoperative right hip pain.  Ambulating independently.  Denies fever chills or neurovascular symptoms [de-identified] : Well-nourished no distress\par Right hip satisfactory gait leg length equal passive motion satisfactory pain-free [de-identified] : Right total hip replacement [de-identified] : Home exercise program follow-up 6 months

## 2021-08-24 ENCOUNTER — NON-APPOINTMENT (OUTPATIENT)
Age: 61
End: 2021-08-24

## 2021-09-10 ENCOUNTER — OUTPATIENT (OUTPATIENT)
Dept: OUTPATIENT SERVICES | Facility: HOSPITAL | Age: 61
LOS: 1 days | End: 2021-09-10
Payer: COMMERCIAL

## 2021-09-10 ENCOUNTER — APPOINTMENT (OUTPATIENT)
Dept: MAMMOGRAPHY | Facility: HOSPITAL | Age: 61
End: 2021-09-10
Payer: COMMERCIAL

## 2021-09-10 ENCOUNTER — RESULT REVIEW (OUTPATIENT)
Age: 61
End: 2021-09-10

## 2021-09-10 ENCOUNTER — APPOINTMENT (OUTPATIENT)
Dept: UROLOGY | Facility: CLINIC | Age: 61
End: 2021-09-10
Payer: COMMERCIAL

## 2021-09-10 ENCOUNTER — NON-APPOINTMENT (OUTPATIENT)
Age: 61
End: 2021-09-10

## 2021-09-10 DIAGNOSIS — Z96.642 PRESENCE OF LEFT ARTIFICIAL HIP JOINT: Chronic | ICD-10-CM

## 2021-09-10 DIAGNOSIS — N81.9 FEMALE GENITAL PROLAPSE, UNSPECIFIED: Chronic | ICD-10-CM

## 2021-09-10 DIAGNOSIS — Z90.710 ACQUIRED ABSENCE OF BOTH CERVIX AND UTERUS: Chronic | ICD-10-CM

## 2021-09-10 DIAGNOSIS — Z12.39 ENCOUNTER FOR OTHER SCREENING FOR MALIGNANT NEOPLASM OF BREAST: ICD-10-CM

## 2021-09-10 PROCEDURE — 77063 BREAST TOMOSYNTHESIS BI: CPT

## 2021-09-10 PROCEDURE — 77067 SCR MAMMO BI INCL CAD: CPT | Mod: 26

## 2021-09-10 PROCEDURE — 77063 BREAST TOMOSYNTHESIS BI: CPT | Mod: 26

## 2021-09-10 PROCEDURE — 99243 OFF/OP CNSLTJ NEW/EST LOW 30: CPT

## 2021-09-10 PROCEDURE — 77067 SCR MAMMO BI INCL CAD: CPT

## 2021-09-10 NOTE — HISTORY OF PRESENT ILLNESS
[FreeTextEntry1] : This patient presents for the evaluation of asymptomatic microhematuria noted by her physician on a routine determination.  She has no history of urinary tract issues in the past

## 2021-09-10 NOTE — END OF VISIT
[FreeTextEntry3] : Your CAT scan is ordered and cystoscopy will be done.  The etiology and the reasons for evaluation were gone over in detail with the patient

## 2021-09-11 LAB
APPEARANCE: CLEAR
BACTERIA: NEGATIVE
BILIRUBIN URINE: NEGATIVE
BLOOD URINE: NEGATIVE
COLOR: NORMAL
GLUCOSE QUALITATIVE U: NEGATIVE
HYALINE CASTS: 0 /LPF
KETONES URINE: NEGATIVE
LEUKOCYTE ESTERASE URINE: NEGATIVE
MICROSCOPIC-UA: NORMAL
NITRITE URINE: NEGATIVE
PH URINE: 6
PROTEIN URINE: NEGATIVE
RED BLOOD CELLS URINE: 0 /HPF
SPECIFIC GRAVITY URINE: 1.01
SQUAMOUS EPITHELIAL CELLS: 1 /HPF
UROBILINOGEN URINE: NORMAL
WHITE BLOOD CELLS URINE: 1 /HPF

## 2021-09-12 LAB — BACTERIA UR CULT: NORMAL

## 2021-09-13 DIAGNOSIS — R92.8 OTHER ABNORMAL AND INCONCLUSIVE FINDINGS ON DIAGNOSTIC IMAGING OF BREAST: ICD-10-CM

## 2021-09-13 LAB — URINE CYTOLOGY: NORMAL

## 2021-09-14 ENCOUNTER — NON-APPOINTMENT (OUTPATIENT)
Age: 61
End: 2021-09-14

## 2021-09-14 ENCOUNTER — RESULT REVIEW (OUTPATIENT)
Age: 61
End: 2021-09-14

## 2021-09-16 ENCOUNTER — NON-APPOINTMENT (OUTPATIENT)
Age: 61
End: 2021-09-16

## 2021-09-18 ENCOUNTER — OUTPATIENT (OUTPATIENT)
Dept: OUTPATIENT SERVICES | Facility: HOSPITAL | Age: 61
LOS: 1 days | End: 2021-09-18
Payer: COMMERCIAL

## 2021-09-18 ENCOUNTER — APPOINTMENT (OUTPATIENT)
Dept: CT IMAGING | Facility: CLINIC | Age: 61
End: 2021-09-18
Payer: COMMERCIAL

## 2021-09-18 DIAGNOSIS — N81.9 FEMALE GENITAL PROLAPSE, UNSPECIFIED: Chronic | ICD-10-CM

## 2021-09-18 DIAGNOSIS — Z90.710 ACQUIRED ABSENCE OF BOTH CERVIX AND UTERUS: Chronic | ICD-10-CM

## 2021-09-18 DIAGNOSIS — Z00.8 ENCOUNTER FOR OTHER GENERAL EXAMINATION: ICD-10-CM

## 2021-09-18 DIAGNOSIS — Z96.642 PRESENCE OF LEFT ARTIFICIAL HIP JOINT: Chronic | ICD-10-CM

## 2021-09-18 PROCEDURE — 74178 CT ABD&PLV WO CNTR FLWD CNTR: CPT | Mod: 26

## 2021-09-18 PROCEDURE — 71260 CT THORAX DX C+: CPT | Mod: 26

## 2021-09-18 PROCEDURE — 71260 CT THORAX DX C+: CPT

## 2021-09-18 PROCEDURE — 82565 ASSAY OF CREATININE: CPT

## 2021-09-18 PROCEDURE — 74178 CT ABD&PLV WO CNTR FLWD CNTR: CPT

## 2021-09-23 ENCOUNTER — NON-APPOINTMENT (OUTPATIENT)
Age: 61
End: 2021-09-23

## 2021-09-24 ENCOUNTER — APPOINTMENT (OUTPATIENT)
Dept: MAMMOGRAPHY | Facility: HOSPITAL | Age: 61
End: 2021-09-24

## 2021-09-24 ENCOUNTER — APPOINTMENT (OUTPATIENT)
Dept: ULTRASOUND IMAGING | Facility: HOSPITAL | Age: 61
End: 2021-09-24

## 2021-10-12 ENCOUNTER — APPOINTMENT (OUTPATIENT)
Dept: UROLOGY | Facility: CLINIC | Age: 61
End: 2021-10-12
Payer: COMMERCIAL

## 2021-10-12 DIAGNOSIS — Z87.898 PERSONAL HISTORY OF OTHER SPECIFIED CONDITIONS: ICD-10-CM

## 2021-10-12 DIAGNOSIS — N34.3 URETHRAL SYNDROME, UNSPECIFIED: ICD-10-CM

## 2021-10-12 DIAGNOSIS — R31.29 OTHER MICROSCOPIC HEMATURIA: ICD-10-CM

## 2021-10-12 PROCEDURE — 52000 CYSTOURETHROSCOPY: CPT

## 2021-10-13 PROBLEM — R31.29 MICROHEMATURIA: Status: ACTIVE | Noted: 2021-06-23

## 2021-10-13 PROBLEM — N34.3 URETHRAL SYNDROME: Status: ACTIVE | Noted: 2021-10-13

## 2021-10-15 ENCOUNTER — APPOINTMENT (OUTPATIENT)
Dept: FAMILY MEDICINE | Facility: CLINIC | Age: 61
End: 2021-10-15
Payer: COMMERCIAL

## 2021-10-15 VITALS
DIASTOLIC BLOOD PRESSURE: 80 MMHG | SYSTOLIC BLOOD PRESSURE: 120 MMHG | HEART RATE: 50 BPM | WEIGHT: 189 LBS | OXYGEN SATURATION: 99 % | RESPIRATION RATE: 16 BRPM | TEMPERATURE: 97.1 F | BODY MASS INDEX: 27.12 KG/M2

## 2021-10-15 PROCEDURE — 90686 IIV4 VACC NO PRSV 0.5 ML IM: CPT

## 2021-10-15 PROCEDURE — 99396 PREV VISIT EST AGE 40-64: CPT | Mod: 25

## 2021-10-15 PROCEDURE — G0008: CPT

## 2021-10-17 NOTE — PHYSICAL EXAM
[No Acute Distress] : no acute distress [Well Nourished] : well nourished [Well Developed] : well developed [Well-Appearing] : well-appearing [Normal Voice/Communication] : normal voice/communication [Normal Sclera/Conjunctiva] : normal sclera/conjunctiva [PERRL] : pupils equal round and reactive to light [EOMI] : extraocular movements intact [Normal TMs] : both tympanic membranes were normal [Normal Oropharynx] : the oropharynx was normal [No Lymphadenopathy] : no lymphadenopathy [Supple] : supple [Thyroid Normal, No Nodules] : the thyroid was normal and there were no nodules present [No Respiratory Distress] : no respiratory distress  [No Accessory Muscle Use] : no accessory muscle use [Clear to Auscultation] : lungs were clear to auscultation bilaterally [Normal Rate] : normal rate  [Normal S1, S2] : normal S1 and S2 [Regular Rhythm] : with a regular rhythm [Pedal Pulses Present] : the pedal pulses are present [No Edema] : there was no peripheral edema [No Extremity Clubbing/Cyanosis] : no extremity clubbing/cyanosis [Normal Appearance] : normal in appearance [No Nipple Discharge] : no nipple discharge [No Axillary Lymphadenopathy] : no axillary lymphadenopathy [Non Tender] : non-tender [Soft] : abdomen soft [Non-distended] : non-distended [No Masses] : no abdominal mass palpated [No HSM] : no HSM [Normal Supraclavicular Nodes] : no supraclavicular lymphadenopathy [Normal Anterior Cervical Nodes] : no anterior cervical lymphadenopathy [Speech Grossly Normal] : speech grossly normal [Normal Affect] : the affect was normal [Memory Grossly Normal] : memory grossly normal [Alert and Oriented x3] : oriented to person, place, and time [Normal Mood] : the mood was normal [Normal Insight/Judgement] : insight and judgment were intact

## 2021-10-17 NOTE — HISTORY OF PRESENT ILLNESS
[FreeTextEntry1] : Presents for CPE. Feels well. Had w/u for microhematuria. no uterus/no cervix. Last mammogram few weeks ago, looked good. +dilatation of ascending aorta being followed by Dr. Palla, cardiology.

## 2021-10-17 NOTE — REVIEW OF SYSTEMS
[Redness] : redness [Itching] : itching [Nocturia] : nocturia [Headache] : headache [Insomnia] : insomnia [Fever] : no fever [Chills] : no chills [Fatigue] : no fatigue [Hot Flashes] : no hot flashes [Night Sweats] : no night sweats [Recent Change In Weight] : ~T no recent weight change [Discharge] : no discharge [Pain] : no pain [Dryness] : no dryness  [Vision Problems] : no vision problems [Earache] : no earache [Hearing Loss] : no hearing loss [Nosebleed] : no nosebleeds [Hoarseness] : no hoarseness [Nasal Discharge] : no nasal discharge [Sore Throat] : no sore throat [Postnasal Drip] : no postnasal drip [Chest Pain] : no chest pain [Palpitations] : no palpitations [Leg Claudication] : no leg claudication [Lower Ext Edema] : no lower extremity edema [Shortness Of Breath] : no shortness of breath [Wheezing] : no wheezing [Cough] : no cough [Dyspnea on Exertion] : no dyspnea on exertion [Abdominal Pain] : no abdominal pain [Nausea] : no nausea [Constipation] : no constipation [Diarrhea] : diarrhea [Vomiting] : no vomiting [Heartburn] : no heartburn [Melena] : no melena [Dysuria] : no dysuria [Incontinence] : no incontinence [Hematuria] : no hematuria [Frequency] : no frequency [Joint Pain] : no joint pain [Joint Stiffness] : no joint stiffness [Joint Swelling] : no joint swelling [Muscle Weakness] : no muscle weakness [Muscle Pain] : no muscle pain [Back Pain] : no back pain [Itching] : no itching [Mole Changes] : no mole changes [Nail Changes] : no nail changes [Hair Changes] : no hair changes [Skin Rash] : no skin rash [Dizziness] : no dizziness [Fainting] : no fainting [Confusion] : no confusion [Memory Loss] : no memory loss [Suicidal] : not suicidal [Anxiety] : no anxiety [Depression] : no depression [Easy Bleeding] : no easy bleeding [Easy Bruising] : no easy bruising [Swollen Glands] : no swollen glands [FreeTextEntry3] : seasonal allergies [FreeTextEntry4] : +tinnitus, saw ENT [FreeTextEntry6] : exercise: babysitting [FreeTextEntry7] : colonoscopy due soon [FreeTextEntry8] : microscopic only [FreeTextEntry9] : right hip replaced 2nd one, doing well [de-identified] : left chest mole hasn't grown [de-identified] : occ headaches, one this year-h/o migranes

## 2021-10-24 ENCOUNTER — RX RENEWAL (OUTPATIENT)
Age: 61
End: 2021-10-24

## 2021-10-26 ENCOUNTER — NON-APPOINTMENT (OUTPATIENT)
Age: 61
End: 2021-10-26

## 2021-10-26 DIAGNOSIS — Z87.898 PERSONAL HISTORY OF OTHER SPECIFIED CONDITIONS: ICD-10-CM

## 2021-12-07 ENCOUNTER — RX RENEWAL (OUTPATIENT)
Age: 61
End: 2021-12-07

## 2021-12-09 ENCOUNTER — RX RENEWAL (OUTPATIENT)
Age: 61
End: 2021-12-09

## 2021-12-17 ENCOUNTER — NON-APPOINTMENT (OUTPATIENT)
Age: 61
End: 2021-12-17

## 2021-12-17 ENCOUNTER — APPOINTMENT (OUTPATIENT)
Dept: CARDIOLOGY | Facility: CLINIC | Age: 61
End: 2021-12-17
Payer: COMMERCIAL

## 2021-12-17 VITALS
BODY MASS INDEX: 27.49 KG/M2 | HEIGHT: 70 IN | OXYGEN SATURATION: 100 % | WEIGHT: 192 LBS | HEART RATE: 53 BPM | SYSTOLIC BLOOD PRESSURE: 120 MMHG | DIASTOLIC BLOOD PRESSURE: 80 MMHG

## 2021-12-17 PROCEDURE — 99214 OFFICE O/P EST MOD 30 MIN: CPT

## 2021-12-17 PROCEDURE — 93000 ELECTROCARDIOGRAM COMPLETE: CPT

## 2021-12-17 NOTE — ASSESSMENT
[FreeTextEntry1] : 61 year old female  above hx \par \par \par without active cardiac symptoms who has abnormal ekg stable  with normal nuclear stress test , normal LVEF   \par \par HTN  : improved on medication  encourage to be compliant to low salt diet \par \par Dilated aortic root and ascending aorta   : close monitoring the size with periodic echocardiogram , strict bp control , continue statin \par \par HLD  mild elevated ,  increased  pravastatin  to 80 mg daily , with diet  restriction  will check blood work \par \par Hypothyroidism  controlled continue  medication \par \par

## 2021-12-17 NOTE — CARDIOLOGY SUMMARY
[de-identified] : 12/17/21  sinus rhythm Poor R wave progression ,  TWI anterior leads V1 to V3  [de-identified] : 6/15/21  Normal chemical nuclear stress test  [de-identified] : 6/4/21  moderately dilated aortic root 4.5 cm ,  dilated ascending aorta 4.3 cm  normal left ventricular systolic function

## 2021-12-17 NOTE — HISTORY OF PRESENT ILLNESS
[FreeTextEntry1] : 61  year  old female  with hx of HTN HLD  esophageal spasm rare episode GERD  hypothyroidism  came for pre op cardiac evaluation for right hip replacement . patient had recommended tests , Patient had normal chemical stress test ,   echo showed dilated aortic root 4.5 cm  and ascending aorta  Patient  contrast  CT chest   root  size 4.2 cm , ascending aorta 4.1 cm \par \par Patient denies active cardiac symptoms currently , patient did have hx of esophageal spasm associated with chest pain resolves with drinking water , denies any exertional chest pain or shortness of breath or dizziness or palpitation , though her physical exertion is limited due to hip pain \par \par her blood pressure is  controlled on current medication , , patient ekg  is abnormal some what similar to prior ekg   her cholesterol is mild elevated 206  LDL   106  is tolerating pravastatin 80 mg , \par \par , patient does have migraine attacks if she increases the dose ,  did not tolerate crestor \par \par

## 2022-01-11 LAB
BASOPHILS # BLD AUTO: 0.03 K/UL
BASOPHILS NFR BLD AUTO: 0.9 %
CHOLEST SERPL-MCNC: 206 MG/DL
EOSINOPHIL # BLD AUTO: 0.1 K/UL
EOSINOPHIL NFR BLD AUTO: 2.9 %
HCT VFR BLD CALC: 39.1 %
HDLC SERPL-MCNC: 85 MG/DL
HGB BLD-MCNC: 12.6 G/DL
IMM GRANULOCYTES NFR BLD AUTO: 0 %
LDLC SERPL CALC-MCNC: 103 MG/DL
LYMPHOCYTES # BLD AUTO: 1.29 K/UL
LYMPHOCYTES NFR BLD AUTO: 37 %
MAN DIFF?: NORMAL
MCHC RBC-ENTMCNC: 30.9 PG
MCHC RBC-ENTMCNC: 32.2 GM/DL
MCV RBC AUTO: 95.8 FL
MONOCYTES # BLD AUTO: 0.37 K/UL
MONOCYTES NFR BLD AUTO: 10.6 %
NEUTROPHILS # BLD AUTO: 1.7 K/UL
NEUTROPHILS NFR BLD AUTO: 48.6 %
NONHDLC SERPL-MCNC: 121 MG/DL
PLATELET # BLD AUTO: 210 K/UL
RBC # BLD: 4.08 M/UL
RBC # FLD: 13.7 %
TRIGL SERPL-MCNC: 88 MG/DL
WBC # FLD AUTO: 3.49 K/UL

## 2022-01-13 ENCOUNTER — RX RENEWAL (OUTPATIENT)
Age: 62
End: 2022-01-13

## 2022-01-13 LAB
25(OH)D3 SERPL-MCNC: 49.7 NG/ML
ALBUMIN SERPL ELPH-MCNC: 4.4 G/DL
ALP BLD-CCNC: 78 U/L
ALT SERPL-CCNC: 21 U/L
ANION GAP SERPL CALC-SCNC: 12 MMOL/L
AST SERPL-CCNC: 20 U/L
BILIRUB SERPL-MCNC: 0.3 MG/DL
BUN SERPL-MCNC: 11 MG/DL
CALCIUM SERPL-MCNC: 9.6 MG/DL
CHLORIDE SERPL-SCNC: 107 MMOL/L
CO2 SERPL-SCNC: 25 MMOL/L
CREAT SERPL-MCNC: 0.85 MG/DL
ESTIMATED AVERAGE GLUCOSE: 100 MG/DL
GLUCOSE SERPL-MCNC: 80 MG/DL
HBA1C MFR BLD HPLC: 5.1 %
POTASSIUM SERPL-SCNC: 4 MMOL/L
PROT SERPL-MCNC: 6.1 G/DL
SODIUM SERPL-SCNC: 144 MMOL/L
TSH SERPL-ACNC: 0.27 UIU/ML

## 2022-01-16 NOTE — HISTORY OF PRESENT ILLNESS
[Vaginal Wall Prolapse] : none [] : years ago [Rectal Prolapse] : none [Unable To Restrain Bowel Movement] : none [Urinary Frequency] : none [FreeTextEntry3] : sometimes [de-identified] : few [FreeTextEntry5] : worse when more active [de-identified] : POP getting worse over time [de-identified] : + sexually active [FreeTextEntry1] : 57yo with Stage 2 POP and JUNIOR. No new complaints or concerns today. \par \par PMH: OAB, JUNIOR, HTN, HLD, hypothyroidism, RLS\par PSH: TVH by Dr. Arvizu with prolapse surgery - no mesh, left hip replacement \par All: ACE inhibitors, Clindamycin, Sulfa, Mobic\par \par UDS 12/20/18: Leak at 400-700cc at 71-119cm, MUCP 59, no DI\par \par Urine Cx: obtained today 2/4/19\par \par Clearance scheduled for week before surgery.  Ampicillin

## 2022-01-20 NOTE — H&P PST ADULT - PRO ARRIVE FROM
home Cimetidine Counseling:  I discussed with the patient the risks of Cimetidine including but not limited to gynecomastia, headache, diarrhea, nausea, drowsiness, arrhythmias, pancreatitis, skin rashes, psychosis, bone marrow suppression and kidney toxicity.

## 2022-01-28 ENCOUNTER — RX RENEWAL (OUTPATIENT)
Age: 62
End: 2022-01-28

## 2022-02-14 ENCOUNTER — RX RENEWAL (OUTPATIENT)
Age: 62
End: 2022-02-14

## 2022-02-17 LAB
BASOPHILS # BLD AUTO: 0.02 K/UL
BASOPHILS NFR BLD AUTO: 0.5 %
EOSINOPHIL # BLD AUTO: 0.12 K/UL
EOSINOPHIL NFR BLD AUTO: 2.8 %
HCT VFR BLD CALC: 41.4 %
HGB BLD-MCNC: 13.7 G/DL
IMM GRANULOCYTES NFR BLD AUTO: 0.2 %
LYMPHOCYTES # BLD AUTO: 1.54 K/UL
LYMPHOCYTES NFR BLD AUTO: 36.2 %
MAN DIFF?: NORMAL
MCHC RBC-ENTMCNC: 31.4 PG
MCHC RBC-ENTMCNC: 33.1 GM/DL
MCV RBC AUTO: 95 FL
MONOCYTES # BLD AUTO: 0.36 K/UL
MONOCYTES NFR BLD AUTO: 8.5 %
NEUTROPHILS # BLD AUTO: 2.2 K/UL
NEUTROPHILS NFR BLD AUTO: 51.8 %
PLATELET # BLD AUTO: 226 K/UL
RBC # BLD: 4.36 M/UL
RBC # FLD: 12.9 %
WBC # FLD AUTO: 4.25 K/UL

## 2022-03-10 ENCOUNTER — NON-APPOINTMENT (OUTPATIENT)
Age: 62
End: 2022-03-10

## 2022-03-14 ENCOUNTER — APPOINTMENT (OUTPATIENT)
Dept: ORTHOPEDIC SURGERY | Facility: CLINIC | Age: 62
End: 2022-03-14
Payer: COMMERCIAL

## 2022-03-14 PROCEDURE — 99213 OFFICE O/P EST LOW 20 MIN: CPT

## 2022-03-14 NOTE — HISTORY OF PRESENT ILLNESS
[de-identified] : Right total hip replacement July 1, 2021 [de-identified] : Reports marked decrease in preoperative right hip pain.  Ambulating independently.  Denies fever chills or neurovascular symptoms [de-identified] : Well-nourished no distress\par Right hip satisfactory gait leg length equal passive motion satisfactory pain-free [de-identified] : Right total hip replacement [de-identified] : Home exercise program follow-up 6 months

## 2022-03-14 NOTE — HISTORY OF PRESENT ILLNESS
[de-identified] : Right total hip replacement July 1, 2021 [de-identified] : Reports marked decrease in preoperative right hip pain.  Ambulating independently.  Denies fever chills or neurovascular symptoms [de-identified] : Well-nourished no distress\par Right hip satisfactory gait leg length equal passive motion satisfactory pain-free [de-identified] : Right total hip replacement [de-identified] : Home exercise program follow-up 6 months

## 2022-04-27 ENCOUNTER — APPOINTMENT (OUTPATIENT)
Dept: FAMILY MEDICINE | Facility: CLINIC | Age: 62
End: 2022-04-27
Payer: COMMERCIAL

## 2022-04-27 VITALS
WEIGHT: 198 LBS | BODY MASS INDEX: 28.35 KG/M2 | TEMPERATURE: 97 F | OXYGEN SATURATION: 99 % | RESPIRATION RATE: 16 BRPM | HEIGHT: 70 IN | DIASTOLIC BLOOD PRESSURE: 69 MMHG | HEART RATE: 62 BPM | SYSTOLIC BLOOD PRESSURE: 133 MMHG

## 2022-04-27 DIAGNOSIS — R22.31 LOCALIZED SWELLING, MASS AND LUMP, RIGHT UPPER LIMB: ICD-10-CM

## 2022-04-27 DIAGNOSIS — G47.00 INSOMNIA, UNSPECIFIED: ICD-10-CM

## 2022-04-27 PROCEDURE — 99214 OFFICE O/P EST MOD 30 MIN: CPT

## 2022-04-27 NOTE — PHYSICAL EXAM
[No Acute Distress] : no acute distress [Well Nourished] : well nourished [Well Developed] : well developed [Well-Appearing] : well-appearing [Normal Voice/Communication] : normal voice/communication [Normal Sclera/Conjunctiva] : normal sclera/conjunctiva [Normal Outer Ear/Nose] : the outer ears and nose were normal in appearance [Normal Oropharynx] : the oropharynx was normal [Supple] : supple [No Respiratory Distress] : no respiratory distress  [No Accessory Muscle Use] : no accessory muscle use [Clear to Auscultation] : lungs were clear to auscultation bilaterally [Normal Rate] : normal rate  [Regular Rhythm] : with a regular rhythm [Normal S1, S2] : normal S1 and S2 [Coordination Grossly Intact] : coordination grossly intact [No Focal Deficits] : no focal deficits [Normal Gait] : normal gait [Speech Grossly Normal] : speech grossly normal [Memory Grossly Normal] : memory grossly normal [Normal Affect] : the affect was normal [Alert and Oriented x3] : oriented to person, place, and time [Normal Mood] : the mood was normal [Normal Insight/Judgement] : insight and judgment were intact [de-identified] : volar surface of right arm 0.4 cm ?nodule, rubbery, non-tender

## 2022-04-27 NOTE — HISTORY OF PRESENT ILLNESS
[FreeTextEntry1] : Catia presents for insomnia, HTN f/u, hypothyroid f/u, needs renewal of Ambien. right forearm volar surface ?nodule 0.4 cm circumference, non-tender x 2 months. no h/o trauma remembered, although is right handed and sometimes doesn't remember if hits her hand on something. No reported ha, blurry vision or epistaxis, feels well. \par renewed ambien\par \par \par

## 2022-05-04 NOTE — ASSESSMENT
6/2/21 ME PERSISTS , HGA1C IMPROVED TO 8.5. FOCAL LASER WITHIN 1 WEEK. [FreeTextEntry1] : d/w patient importance of colonoscopy\par f/u 6 months for weight f/u

## 2022-05-09 ENCOUNTER — TRANSCRIPTION ENCOUNTER (OUTPATIENT)
Age: 62
End: 2022-05-09

## 2022-05-09 DIAGNOSIS — R22.9 LOCALIZED SWELLING, MASS AND LUMP, UNSPECIFIED: ICD-10-CM

## 2022-05-10 ENCOUNTER — APPOINTMENT (OUTPATIENT)
Dept: ULTRASOUND IMAGING | Facility: HOSPITAL | Age: 62
End: 2022-05-10

## 2022-05-10 ENCOUNTER — TRANSCRIPTION ENCOUNTER (OUTPATIENT)
Age: 62
End: 2022-05-10

## 2022-05-26 ENCOUNTER — NON-APPOINTMENT (OUTPATIENT)
Age: 62
End: 2022-05-26

## 2022-05-27 ENCOUNTER — NON-APPOINTMENT (OUTPATIENT)
Age: 62
End: 2022-05-27

## 2022-06-06 ENCOUNTER — RX RENEWAL (OUTPATIENT)
Age: 62
End: 2022-06-06

## 2022-06-09 ENCOUNTER — APPOINTMENT (OUTPATIENT)
Dept: CARDIOLOGY | Facility: CLINIC | Age: 62
End: 2022-06-09
Payer: COMMERCIAL

## 2022-06-09 ENCOUNTER — NON-APPOINTMENT (OUTPATIENT)
Age: 62
End: 2022-06-09

## 2022-06-09 VITALS
HEART RATE: 57 BPM | HEIGHT: 70 IN | OXYGEN SATURATION: 98 % | SYSTOLIC BLOOD PRESSURE: 118 MMHG | DIASTOLIC BLOOD PRESSURE: 72 MMHG | BODY MASS INDEX: 28.35 KG/M2 | WEIGHT: 198 LBS

## 2022-06-09 PROCEDURE — 93000 ELECTROCARDIOGRAM COMPLETE: CPT

## 2022-06-09 PROCEDURE — 99214 OFFICE O/P EST MOD 30 MIN: CPT

## 2022-06-09 PROCEDURE — 93306 TTE W/DOPPLER COMPLETE: CPT

## 2022-06-09 RX ORDER — EPINEPHRINE 0.3 MG/.3ML
0.3 INJECTION INTRAMUSCULAR
Qty: 1 | Refills: 3 | Status: DISCONTINUED | COMMUNITY
Start: 2020-02-28 | End: 2022-06-09

## 2022-06-09 NOTE — HISTORY OF PRESENT ILLNESS
[FreeTextEntry1] : 61  year  old female  with hx of HTN HLD  esophageal spasm rare episode GERD  hypothyroidism  \par came for follow up says she is doing ok \par \par \par Patient had normal chemical stress test ,   echo showed dilated aortic root 4.5 cm  and ascending aorta  Patient  contrast  CT chest   root  size 4.2 cm , ascending aorta 4.1 cm \par \par Patient denies active cardiac symptoms currently , patient did have hx of esophageal spasm associated with chest pain resolves with drinking water , denies any exertional chest pain or shortness of breath or dizziness or palpitation , though her physical exertion is limited due to hip pain \par \par her blood pressure is  controlled on current medication , , patient ekg  is abnormal some what similar to prior ekg   her cholesterol is mild elevated 206  LDL   106  started taking rosuvastatin 10 mg po daily \par

## 2022-06-09 NOTE — ASSESSMENT
[FreeTextEntry1] : 61 year old female  above hx \par \par \par without active cardiac symptoms who has abnormal ekg stable  with normal nuclear stress test , normal LVEF   \par \par HTN  : improved on medication  encourage to be compliant to low salt diet \par \par Dilated aortic root and ascending aorta   CT chest showed 4.2 cm aortic root , 4.1 cm mid ascending aorta  : close monitoring the size with periodic echocardiogram , strict bp control , continue statin , will obtain echo to monitor size \par \par HLD  mild elevated , continue rosuvastatin 10 mg po daily  , with diet  restriction  will check blood work \par \par Hypothyroidism  controlled continue  medication \par \par

## 2022-06-09 NOTE — CARDIOLOGY SUMMARY
[de-identified] : 6/9/22 sinus rhythm Poor R wave progression ,  TWI anterior leads V1 to V3   no significant change from prior  [de-identified] : 6/15/21  Normal chemical nuclear stress test  [de-identified] : 6/4/21  moderately dilated aortic root 4.5 cm ,  dilated ascending aorta 4.3 cm  normal left ventricular systolic function

## 2022-06-10 LAB
25(OH)D3 SERPL-MCNC: 48.2 NG/ML
ALBUMIN SERPL ELPH-MCNC: 4.8 G/DL
ALP BLD-CCNC: 99 U/L
ALT SERPL-CCNC: 22 U/L
ANION GAP SERPL CALC-SCNC: 13 MMOL/L
AST SERPL-CCNC: 22 U/L
BASOPHILS # BLD AUTO: 0.02 K/UL
BASOPHILS NFR BLD AUTO: 0.4 %
BILIRUB SERPL-MCNC: 0.2 MG/DL
BUN SERPL-MCNC: 16 MG/DL
CALCIUM SERPL-MCNC: 9.8 MG/DL
CHLORIDE SERPL-SCNC: 103 MMOL/L
CHOLEST SERPL-MCNC: 175 MG/DL
CO2 SERPL-SCNC: 24 MMOL/L
CREAT SERPL-MCNC: 0.74 MG/DL
EGFR: 91 ML/MIN/1.73M2
EOSINOPHIL # BLD AUTO: 0.13 K/UL
EOSINOPHIL NFR BLD AUTO: 2.3 %
ESTIMATED AVERAGE GLUCOSE: 97 MG/DL
GLUCOSE SERPL-MCNC: 92 MG/DL
HBA1C MFR BLD HPLC: 5 %
HCT VFR BLD CALC: 40.3 %
HDLC SERPL-MCNC: 72 MG/DL
HGB BLD-MCNC: 13.2 G/DL
IMM GRANULOCYTES NFR BLD AUTO: 0.4 %
LDLC SERPL CALC-MCNC: 87 MG/DL
LYMPHOCYTES # BLD AUTO: 1.54 K/UL
LYMPHOCYTES NFR BLD AUTO: 27 %
MAN DIFF?: NORMAL
MCHC RBC-ENTMCNC: 30.8 PG
MCHC RBC-ENTMCNC: 32.8 GM/DL
MCV RBC AUTO: 93.9 FL
MONOCYTES # BLD AUTO: 0.56 K/UL
MONOCYTES NFR BLD AUTO: 9.8 %
NEUTROPHILS # BLD AUTO: 3.43 K/UL
NEUTROPHILS NFR BLD AUTO: 60.1 %
NONHDLC SERPL-MCNC: 103 MG/DL
PLATELET # BLD AUTO: 295 K/UL
POTASSIUM SERPL-SCNC: 4.9 MMOL/L
PROT SERPL-MCNC: 6.8 G/DL
RBC # BLD: 4.29 M/UL
RBC # FLD: 13.1 %
SODIUM SERPL-SCNC: 140 MMOL/L
TRIGL SERPL-MCNC: 80 MG/DL
TSH SERPL-ACNC: 0.16 UIU/ML
WBC # FLD AUTO: 5.7 K/UL

## 2022-06-22 DIAGNOSIS — M77.9 ENTHESOPATHY, UNSPECIFIED: ICD-10-CM

## 2022-06-27 ENCOUNTER — NON-APPOINTMENT (OUTPATIENT)
Age: 62
End: 2022-06-27

## 2022-07-28 ENCOUNTER — APPOINTMENT (OUTPATIENT)
Dept: ORTHOPEDIC SURGERY | Facility: CLINIC | Age: 62
End: 2022-07-28

## 2022-07-28 VITALS — WEIGHT: 195 LBS | HEIGHT: 70 IN | BODY MASS INDEX: 27.92 KG/M2

## 2022-07-28 DIAGNOSIS — M65.9 SYNOVITIS AND TENOSYNOVITIS, UNSPECIFIED: ICD-10-CM

## 2022-07-28 PROCEDURE — 99214 OFFICE O/P EST MOD 30 MIN: CPT

## 2022-08-15 ENCOUNTER — RX RENEWAL (OUTPATIENT)
Age: 62
End: 2022-08-15

## 2022-09-12 ENCOUNTER — APPOINTMENT (OUTPATIENT)
Dept: ORTHOPEDIC SURGERY | Facility: CLINIC | Age: 62
End: 2022-09-12

## 2022-09-12 VITALS — BODY MASS INDEX: 27.92 KG/M2 | WEIGHT: 195 LBS | HEIGHT: 70 IN

## 2022-09-12 PROCEDURE — 99213 OFFICE O/P EST LOW 20 MIN: CPT

## 2022-09-12 NOTE — PHYSICAL EXAM
[de-identified] : Constitutional:Well nourished , well developed and in no acute distress\par Psychiatric: Alert and oriented to time place and person.Appropriate affect\par Respiratory: Unlabored respirations,no audible wheezing\par Cardiovascular: no leg swelling  ankle edema\par Vascular: no calf or thigh tenderness, \par Peripheral pulses; intact\par Skin:Head, neck, arms and lower extremities:no lesions or discoloration\par Lymphatics:No groin adenopathy\par Neurological: intact light touch sensation and grossly intact coordination and motor power.\par Right hip Satisfactory gait leg length equal passive range of motion satisfactory pain-free\par 
normal...

## 2022-09-12 NOTE — HISTORY OF PRESENT ILLNESS
[de-identified] : Follow-up right total hip replacement July 2021.  Reports right hip is pain and symptom free.  Ambulating independently and denies any restriction on activities.

## 2022-11-07 ENCOUNTER — APPOINTMENT (OUTPATIENT)
Dept: ORTHOPEDIC SURGERY | Facility: CLINIC | Age: 62
End: 2022-11-07

## 2022-11-07 VITALS — WEIGHT: 195 LBS | HEIGHT: 70 IN | BODY MASS INDEX: 27.92 KG/M2

## 2022-11-07 PROCEDURE — 73564 X-RAY EXAM KNEE 4 OR MORE: CPT | Mod: LT

## 2022-11-07 PROCEDURE — 99213 OFFICE O/P EST LOW 20 MIN: CPT

## 2022-11-07 NOTE — DISCUSSION/SUMMARY
[de-identified] : Impression osteoarthritis left knee possible concurrent tear lateral meniscus\par Plan patient reports Aleve provides sufficient relief so she can sleep.  As such continue with Aleve, add Tylenol, physical therapy, if not improved in 1 month will refer for injection therapy

## 2022-11-07 NOTE — PHYSICAL EXAM
[de-identified] : Constitutional:Well nourished , well developed and in no acute distress\par Psychiatric: Alert and oriented to time place and person.Appropriate affect \par Skin:Head, neck, arms and lower extremities:no lesions or discoloration\par HEENT: Normocephalic, EOM intact, Nasal septum midline,\par Respiratory: Unlabored respirations,no audible wheezing ,no tachypnea, no cyanosis\par Cardiovascular: no leg swelling  no ankle edema no JVD, pulse regular\par Vascular: no calf or thigh tenderness, \par Peripheral pulses; intact\par Lymphatics:No groin adenopathy,no lymphedema lower  or upper extremities\par Left knee effusion 1+ flexion 0/125 right 0/140 ligaments intact neurovascular intact tender lateral joint line positive lateral Tamia [de-identified] : X-ray left knee 4 views weightbearing lateral compartment degenerative marginal osteophyte medial compartment degenerative narrowing lateral patellofemoral compartment degenerative narrowing lateral tilt

## 2022-11-10 ENCOUNTER — APPOINTMENT (OUTPATIENT)
Dept: GASTROENTEROLOGY | Facility: CLINIC | Age: 62
End: 2022-11-10

## 2022-11-10 VITALS
HEART RATE: 66 BPM | DIASTOLIC BLOOD PRESSURE: 95 MMHG | BODY MASS INDEX: 27.92 KG/M2 | HEIGHT: 70 IN | SYSTOLIC BLOOD PRESSURE: 159 MMHG | WEIGHT: 195 LBS

## 2022-11-10 DIAGNOSIS — Z12.11 ENCOUNTER FOR SCREENING FOR MALIGNANT NEOPLASM OF COLON: ICD-10-CM

## 2022-11-10 PROCEDURE — 99203 OFFICE O/P NEW LOW 30 MIN: CPT

## 2022-11-10 RX ORDER — PRAVASTATIN SODIUM 80 MG/1
80 TABLET ORAL
Qty: 1 | Refills: 1 | Status: DISCONTINUED | COMMUNITY
Start: 2017-09-18 | End: 2022-11-10

## 2022-11-10 NOTE — ASSESSMENT
[FreeTextEntry1] : 1.  Encounter for colon cancer screening.  Family history of colon polyps (parents).  Previous colonoscopy in 2014 with diverticulosis and hemorrhoids.\par 2.  Sliding hiatal hernia seen on EGD in 2014.\par 3.  HTN.\par 4.  HLD.\par 5.  Hypothyroidism.\par 6.  Hsitory of vaginal prolapse s/p sacrocolpopexy, sling.\par \par Recs:\par - Recent labs reviewed.\par - Patient was advised to undergo colonoscopy- procedure, risks, benefits, and alternatives were explained. Patient agreeable. Brochure given. PEG prep.

## 2022-11-10 NOTE — REVIEW OF SYSTEMS
[Joint Swelling] : joint swelling [Joint Stiffness] : joint stiffness [Negative] : Heme/Lymph [As Noted in HPI] : as noted in HPI

## 2022-11-18 ENCOUNTER — APPOINTMENT (OUTPATIENT)
Dept: FAMILY MEDICINE | Facility: CLINIC | Age: 62
End: 2022-11-18

## 2022-11-18 VITALS
BODY MASS INDEX: 27.77 KG/M2 | HEIGHT: 70 IN | DIASTOLIC BLOOD PRESSURE: 101 MMHG | TEMPERATURE: 97.1 F | SYSTOLIC BLOOD PRESSURE: 162 MMHG | WEIGHT: 194 LBS | OXYGEN SATURATION: 99 % | HEART RATE: 64 BPM | RESPIRATION RATE: 16 BRPM

## 2022-11-18 VITALS — SYSTOLIC BLOOD PRESSURE: 137 MMHG | DIASTOLIC BLOOD PRESSURE: 89 MMHG

## 2022-11-18 PROCEDURE — 99396 PREV VISIT EST AGE 40-64: CPT | Mod: 25

## 2022-11-18 PROCEDURE — G0008: CPT

## 2022-11-18 PROCEDURE — 90686 IIV4 VACC NO PRSV 0.5 ML IM: CPT

## 2022-11-18 RX ORDER — LEVOTHYROXINE SODIUM 0.14 MG/1
137 TABLET ORAL DAILY
Qty: 90 | Refills: 3 | Status: COMPLETED | COMMUNITY
Start: 2022-04-27 | End: 2022-11-18

## 2022-11-18 NOTE — PHYSICAL EXAM
[No Acute Distress] : no acute distress [Well Nourished] : well nourished [Well Developed] : well developed [Well-Appearing] : well-appearing [Normal Sclera/Conjunctiva] : normal sclera/conjunctiva [PERRL] : pupils equal round and reactive to light [EOMI] : extraocular movements intact [Normal Outer Ear/Nose] : the outer ears and nose were normal in appearance [Normal Oropharynx] : the oropharynx was normal [No JVD] : no jugular venous distention [No Lymphadenopathy] : no lymphadenopathy [Supple] : supple [Thyroid Normal, No Nodules] : the thyroid was normal and there were no nodules present [No Respiratory Distress] : no respiratory distress  [No Accessory Muscle Use] : no accessory muscle use [Clear to Auscultation] : lungs were clear to auscultation bilaterally [Normal Rate] : normal rate  [Regular Rhythm] : with a regular rhythm [Normal S1, S2] : normal S1 and S2 [Pedal Pulses Present] : the pedal pulses are present [No Edema] : there was no peripheral edema [No Extremity Clubbing/Cyanosis] : no extremity clubbing/cyanosis [Soft] : abdomen soft [Non Tender] : non-tender [Non-distended] : non-distended [No HSM] : no HSM [Normal Anterior Cervical Nodes] : no anterior cervical lymphadenopathy [No Joint Swelling] : no joint swelling [Grossly Normal Strength/Tone] : grossly normal strength/tone [No Rash] : no rash [Coordination Grossly Intact] : coordination grossly intact [No Focal Deficits] : no focal deficits [Normal Gait] : normal gait [Normal Affect] : the affect was normal [Normal Insight/Judgement] : insight and judgment were intact [Normal Voice/Communication] : normal voice/communication [Normal TMs] : both tympanic membranes were normal [Normal Appearance] : normal in appearance [No Masses] : no palpable masses [No Nipple Discharge] : no nipple discharge [No Axillary Lymphadenopathy] : no axillary lymphadenopathy [Normal Supraclavicular Nodes] : no supraclavicular lymphadenopathy [Speech Grossly Normal] : speech grossly normal [Memory Grossly Normal] : memory grossly normal [Alert and Oriented x3] : oriented to person, place, and time [Normal Mood] : the mood was normal [de-identified] : Jennifer Resendiz RN chaperone

## 2022-11-18 NOTE — REVIEW OF SYSTEMS
[Fatigue] : fatigue [Hot Flashes] : hot flashes [Vision Problems] : vision problems [Hearing Loss] : hearing loss [Joint Pain] : joint pain [Insomnia] : insomnia [Fever] : no fever [Chills] : no chills [Night Sweats] : no night sweats [Recent Change In Weight] : ~T no recent weight change [Discharge] : no discharge [Pain] : no pain [Redness] : no redness [Dryness] : no dryness  [Itching] : no itching [Earache] : no earache [Nosebleed] : no nosebleeds [Hoarseness] : no hoarseness [Nasal Discharge] : no nasal discharge [Sore Throat] : no sore throat [Postnasal Drip] : no postnasal drip [Chest Pain] : no chest pain [Palpitations] : no palpitations [Leg Claudication] : no leg claudication [Lower Ext Edema] : no lower extremity edema [Shortness Of Breath] : no shortness of breath [Wheezing] : no wheezing [Cough] : no cough [Dyspnea on Exertion] : no dyspnea on exertion [Abdominal Pain] : no abdominal pain [Nausea] : no nausea [Constipation] : no constipation [Diarrhea] : diarrhea [Vomiting] : no vomiting [Heartburn] : no heartburn [Melena] : no melena [Dysuria] : no dysuria [Incontinence] : no incontinence [Nocturia] : no nocturia [Hematuria] : no hematuria [Frequency] : no frequency [Joint Stiffness] : no joint stiffness [Joint Swelling] : no joint swelling [Muscle Weakness] : no muscle weakness [Muscle Pain] : no muscle pain [Back Pain] : no back pain [Itching] : no itching [Mole Changes] : no mole changes [Nail Changes] : no nail changes [Hair Changes] : no hair changes [Skin Rash] : no skin rash [Headache] : no headache [Dizziness] : no dizziness [Fainting] : no fainting [Confusion] : no confusion [Memory Loss] : no memory loss [Unsteady Walking] : no ataxia [Suicidal] : not suicidal [Anxiety] : no anxiety [Depression] : no depression [Easy Bleeding] : no easy bleeding [Easy Bruising] : no easy bruising [Swollen Glands] : no swollen glands [FreeTextEntry2] : rare hot flash [FreeTextEntry3] : ophtho: last visit this year [FreeTextEntry7] : colonoscopy: 12/2 scheduled [FreeTextEntry8] : no pmb  [FreeTextEntry9] : PT for left knee didn't start yet

## 2022-11-28 DIAGNOSIS — Z01.818 ENCOUNTER FOR OTHER PREPROCEDURAL EXAMINATION: ICD-10-CM

## 2022-12-01 LAB — SARS-COV-2 N GENE NPH QL NAA+PROBE: NOT DETECTED

## 2022-12-02 ENCOUNTER — APPOINTMENT (OUTPATIENT)
Dept: GASTROENTEROLOGY | Facility: CLINIC | Age: 62
End: 2022-12-02

## 2022-12-02 PROCEDURE — 45378 DIAGNOSTIC COLONOSCOPY: CPT

## 2022-12-24 NOTE — PHYSICAL THERAPY INITIAL EVALUATION ADULT - DIAGNOSIS, PT EVAL
Anesthesia Post Evaluation    Patient: Oscar Ramirez    Procedure(s) Performed: Procedure(s) (LRB):   SECTION (N/A)    Final Anesthesia Type: spinal      Patient location during evaluation: labor & delivery  Patient participation: Yes- Able to Participate  Level of consciousness: awake and alert and oriented  Post-procedure vital signs: reviewed and stable  Pain management: adequate  Airway patency: patent    PONV status at discharge: No PONV  Anesthetic complications: no      Cardiovascular status: blood pressure returned to baseline and hemodynamically stable  Respiratory status: unassisted, spontaneous ventilation and room air  Hydration status: euvolemic  Follow-up not needed.          Vitals Value Taken Time   /90 22 1230   Temp 36.8 °C (98.3 °F) 22 1230   Pulse 68 22 1230   Resp 16 22 1230   SpO2 100 % 22 1230         Event Time   Out of Recovery 21:45:00         Pain/Rell Score: Pain Rating Prior to Med Admin: 7 (2022  5:08 AM)  Pain Rating Post Med Admin: 4 (2022  6:00 AM)         decreased strength right LE

## 2023-01-19 LAB — TSH SERPL-ACNC: 0.45 UIU/ML

## 2023-01-29 ENCOUNTER — RX RENEWAL (OUTPATIENT)
Age: 63
End: 2023-01-29

## 2023-02-12 ENCOUNTER — RX RENEWAL (OUTPATIENT)
Age: 63
End: 2023-02-12

## 2023-02-16 ENCOUNTER — APPOINTMENT (OUTPATIENT)
Dept: CARDIOLOGY | Facility: CLINIC | Age: 63
End: 2023-02-16
Payer: COMMERCIAL

## 2023-02-16 ENCOUNTER — NON-APPOINTMENT (OUTPATIENT)
Age: 63
End: 2023-02-16

## 2023-02-16 VITALS
HEIGHT: 70 IN | BODY MASS INDEX: 28.49 KG/M2 | HEART RATE: 61 BPM | SYSTOLIC BLOOD PRESSURE: 156 MMHG | WEIGHT: 199 LBS | OXYGEN SATURATION: 97 % | DIASTOLIC BLOOD PRESSURE: 94 MMHG

## 2023-02-16 VITALS
OXYGEN SATURATION: 97 % | DIASTOLIC BLOOD PRESSURE: 94 MMHG | HEART RATE: 61 BPM | RESPIRATION RATE: 16 BRPM | SYSTOLIC BLOOD PRESSURE: 156 MMHG

## 2023-02-16 PROCEDURE — 99214 OFFICE O/P EST MOD 30 MIN: CPT | Mod: 25

## 2023-02-16 PROCEDURE — 93000 ELECTROCARDIOGRAM COMPLETE: CPT

## 2023-02-16 RX ORDER — POLYETHYLENE GLYCOL 3350 AND ELECTROLYTES WITH LEMON FLAVOR 236; 22.74; 6.74; 5.86; 2.97 G/4L; G/4L; G/4L; G/4L; G/4L
236 POWDER, FOR SOLUTION ORAL
Qty: 1 | Refills: 0 | Status: DISCONTINUED | COMMUNITY
Start: 2022-11-10 | End: 2023-02-16

## 2023-02-17 RX ORDER — HYDROCHLOROTHIAZIDE 12.5 MG/1
12.5 TABLET ORAL DAILY
Qty: 90 | Refills: 1 | Status: DISCONTINUED | COMMUNITY
Start: 2023-02-16 | End: 2023-02-17

## 2023-02-27 ENCOUNTER — RX CHANGE (OUTPATIENT)
Age: 63
End: 2023-02-27

## 2023-02-28 ENCOUNTER — NON-APPOINTMENT (OUTPATIENT)
Age: 63
End: 2023-02-28

## 2023-03-01 ENCOUNTER — RX CHANGE (OUTPATIENT)
Age: 63
End: 2023-03-01

## 2023-03-10 ENCOUNTER — APPOINTMENT (OUTPATIENT)
Dept: ORTHOPEDIC SURGERY | Facility: CLINIC | Age: 63
End: 2023-03-10
Payer: COMMERCIAL

## 2023-03-10 PROCEDURE — 20611 DRAIN/INJ JOINT/BURSA W/US: CPT | Mod: LT

## 2023-03-10 PROCEDURE — 99214 OFFICE O/P EST MOD 30 MIN: CPT | Mod: 25

## 2023-04-16 ENCOUNTER — RX RENEWAL (OUTPATIENT)
Age: 63
End: 2023-04-16

## 2023-04-20 ENCOUNTER — APPOINTMENT (OUTPATIENT)
Dept: CARDIOLOGY | Facility: CLINIC | Age: 63
End: 2023-04-20

## 2023-04-21 ENCOUNTER — APPOINTMENT (OUTPATIENT)
Dept: FAMILY MEDICINE | Facility: CLINIC | Age: 63
End: 2023-04-21
Payer: COMMERCIAL

## 2023-04-21 VITALS
WEIGHT: 191 LBS | DIASTOLIC BLOOD PRESSURE: 74 MMHG | OXYGEN SATURATION: 98 % | HEART RATE: 60 BPM | BODY MASS INDEX: 27.35 KG/M2 | RESPIRATION RATE: 16 BRPM | HEIGHT: 70 IN | TEMPERATURE: 97.6 F | SYSTOLIC BLOOD PRESSURE: 131 MMHG

## 2023-04-21 PROCEDURE — 99214 OFFICE O/P EST MOD 30 MIN: CPT

## 2023-04-21 NOTE — HISTORY OF PRESENT ILLNESS
[FreeTextEntry1] : Presents for f/u hypothyroid, HTN. Also c/o left knee pain that is recently worsening. Has seen ortho Dr. Andres. Did do PT as well. Wanted to see specialist regarding possible meniscus repair.\par \par ROS: negative except as noted above\par

## 2023-04-21 NOTE — ASSESSMENT
[FreeTextEntry1] : referral to Dr. Herber Lau, ortho\par lower levothyroxine to 112, repeat tsh in 6-8 weeks\par

## 2023-05-03 ENCOUNTER — APPOINTMENT (OUTPATIENT)
Dept: ORTHOPEDIC SURGERY | Facility: CLINIC | Age: 63
End: 2023-05-03

## 2023-05-05 ENCOUNTER — APPOINTMENT (OUTPATIENT)
Dept: ORTHOPEDIC SURGERY | Facility: CLINIC | Age: 63
End: 2023-05-05
Payer: COMMERCIAL

## 2023-05-05 VITALS
OXYGEN SATURATION: 98 % | HEART RATE: 57 BPM | TEMPERATURE: 97.8 F | BODY MASS INDEX: 27.92 KG/M2 | HEIGHT: 70 IN | WEIGHT: 195 LBS

## 2023-05-05 DIAGNOSIS — M25.562 PAIN IN LEFT KNEE: ICD-10-CM

## 2023-05-05 DIAGNOSIS — M17.12 UNILATERAL PRIMARY OSTEOARTHRITIS, LEFT KNEE: ICD-10-CM

## 2023-05-05 PROCEDURE — 72170 X-RAY EXAM OF PELVIS: CPT

## 2023-05-05 PROCEDURE — 73564 X-RAY EXAM KNEE 4 OR MORE: CPT | Mod: LT

## 2023-05-05 PROCEDURE — 99213 OFFICE O/P EST LOW 20 MIN: CPT

## 2023-05-09 PROBLEM — M17.12 PRIMARY OSTEOARTHRITIS OF LEFT KNEE: Status: ACTIVE | Noted: 2022-11-07

## 2023-05-09 NOTE — DISCUSSION/SUMMARY
[de-identified] : Ms. Last is a 62-year-old female presented to the office for evaluation of her left knee pain.  Patient has been experiencing persistent left knee pain since November 2022.  She has tried physical therapy, anti-inflammatories, and injections.  X-rays showed significant left knee osteoarthritis.  Examination showed some tenderness over the posterior knee, but otherwise good knee range of motion.  Discussed with patient the examination and imaging findings.  Discussed with patient the operative and nonoperative management of knee osteoarthritis, including total knee arthroplasty.  Discussed the nonoperative management of knee osteoarthritis, including physical therapy, anti-inflammatories, and injections.  Patient has tried nonoperative management, but continues to have knee pain.  Discussed total knee arthroplasty at length, including surgical procedure, hospital course, DVT prophylaxis, antibiotics, physical therapy, recovery, and risks.  Patient has been previously told that her knee pain was secondary to a meniscus tear.  Discussed that further evaluation of her meniscus and cartilage can be performed through an MRI.  Patient would like an MRI at this time.  MRI of the left knee was ordered.  Patient will follow-up in 3 weeks for reevaluation and management.  Patient understanding and agreement with the plan.  All questions answered.\par \par Plan:\par -MRI Left Knee\par -Follow up in 3 weeks for reevaluation and management

## 2023-05-09 NOTE — HISTORY OF PRESENT ILLNESS
[de-identified] : Ms. Last is a 62-year-old female presents the office for evaluation of her left knee pain.  In August 2022, patient jumped out of her bed and felt a pop in the knee, causing significant pain.  This pain then resolved.  Her pain returned in November 2022 and she has experienced consistently worsening pain.  Patient has a history of bilateral THAs by Dr. Andres and she was evaluated by Dr. Andres for her knee pain.  Patient was still not she may have a meniscus tear.  She tried physical therapy for about 2.5 months, without significant relief.  Patient underwent corticosteroid injection by Dr. Leal on 3/10/2023, which did not provide significant relief.  She has tried Tylenol and Aleve, with some relief.  Patient has a history of a sulfa allergy and is unable to take Celebrex.  She continues to have significant anterior knee pain and difficulty with standing.  There is pain with activity.  Pain is improved with sitting, rest, and ice.  She states that her pain is now affecting her quality of life.\par \par History: HTN, Migraines, Hypothyroidism, Dilated Aortic Root, HLD, Allergy: Sulfa

## 2023-05-09 NOTE — PHYSICAL EXAM
[de-identified] : Constitutional: 62 year old female, alert and oriented, cooperative, in no acute distress.\par \par HEENT \par NC/AT.  Appearance: symmetric\par \par Neck/Back\par Straight without deformity or instability.  Good ROM.\par \par Chest/Respiratory \par Respiratory effort: no intercostal retractions or use of accessory muscles. Nonlabored Breathing\par \par Skin \par On inspection, warm and dry without rashes or lesions.\par \par Mental Status: \par Judgment, insight: intact\par Orientation: oriented to time, place, and person\par \par Neurological:\par Sensory and Motor are grossly intact throughout\par \par Left Knee\par \par Inspection:\par     Skin intact, no rashes or lesions\par     No Effusion\par     Non-tender to palpation over tibial tubercle, patella, medial and lateral joint line, and pes insertion.\par     Tenderness over the posterior knee\par \par Range of Motion:\par 	Extension - 0 degrees\par 	Flexion - 120 degrees\par 	Alignment - Valgus 3 degrees\par 	Extensor lag: None\par \par Stability:\par      Demonstrates no Varus or Valgus instability\par      Negative Anterior or Posterior drawer.\par      Negative Lachman's\par      Negative McMurrays\par \par Patella: stable, tracks well. \par \par Neurologic Exam\par     Motor intact including 5/5 Extensor Hallucis Longus, 5/5 Flexor Hallucis Longus, 5/5 Tibialis Anterior and 5/5 Gastrocnemius\par     Sensation Intact to Light Touch including Saphenous, Sural, Superficial Peroneal, Deep Peroneal, Tibial nerve distributions\par \par Vascular Exam\par     Foot is warm and well perfused with 2+ Dorsalis Pedis Pulse \par \par No pain with range of motion of the bilateral hips or right knee. No lumbar paraspinal muscle tenderness.  [de-identified] : XRay:  XRay of the Pelvis (1 View) taken in the office today and reviewed with the patient. XRays demonstrate Bilateral Total Hip Arthroplasties in good position and alignment. There is no obvious evidence of fracture, dislocation, osteolysis or loosening. (my personal interpretation)\par \par XRay: XRays of the Left Knee (4 Views) taken in the office today and reviewed with the patient. XRays demonstrate tricompartmental joint space narrowing, with subchondral sclerosis, overlying osteophytes, all consistent with severe osteoarthritis, KL Grade: 3. (my personal interpretation)

## 2023-05-10 ENCOUNTER — RX RENEWAL (OUTPATIENT)
Age: 63
End: 2023-05-10

## 2023-05-10 NOTE — HISTORY OF PRESENT ILLNESS
[FreeTextEntry1] : 62 year  old female  with hx of HTN HLD  esophageal spasm rare episode GERD  hypothyroidism  came for follow up says she is feeling fine , her blood pressure markedly elevated 156/94 mm hg , \par \par Patient had normal chemical stress test ,\par \par Patient had echo showed dilated aortic root 4.4 cm  and  4.2 cm ascending aorta  same as prior echo \par \par Patient  contrast  CT chest   root  size 4.2 cm , ascending aorta 4.1 cm \par \par Patient denies active cardiac symptoms currently , patient did have hx of esophageal spasm associated with chest pain resolves with drinking water , denies any exertional chest pain or shortness of breath or dizziness or palpitation , though her physical exertion is limited due to hip pain \par \par  Patient ekg  is abnormal some what similar to prior ekg   her cholesterol  level 175  LDL  87  \par \par patient does have migraine attacks  , now she is tolerating ,rosuvastatin 10 mg po daily \par \par

## 2023-05-10 NOTE — CARDIOLOGY SUMMARY
[de-identified] : 12/17/21  sinus rhythm Poor R wave progression ,  TWI anterior leads V1 to V3  [de-identified] : 6/15/21  Normal chemical nuclear stress test  [de-identified] : 6/9/22   moderately dilated aortic root 4.4 cm ,Dilated ascending aorta 4.2 cm , normal left ventricular systolic function

## 2023-05-10 NOTE — ASSESSMENT
[FreeTextEntry1] : 62 year old female  above hx \par \par \par without active cardiac symptoms who has abnormal ekg stable  with normal nuclear stress test , normal LVEF   \par \par HTN  :  uncontrolled   ,  encourage to be compliant to low salt diet   hCTZ 12.5 mg po daily \par \par Dilated aortic root and ascending aorta   : close monitoring the size with periodic echocardiogram , strict bp control , continue statin \par \par HLD : improved  continue rosuvastatin  with diet  restriction  will check blood work \par \par Hypothyroidism : controlled continue  medication \par \par

## 2023-05-23 ENCOUNTER — OUTPATIENT (OUTPATIENT)
Dept: OUTPATIENT SERVICES | Facility: HOSPITAL | Age: 63
LOS: 1 days | End: 2023-05-23
Payer: COMMERCIAL

## 2023-05-23 ENCOUNTER — APPOINTMENT (OUTPATIENT)
Dept: MRI IMAGING | Facility: CLINIC | Age: 63
End: 2023-05-23
Payer: COMMERCIAL

## 2023-05-23 DIAGNOSIS — Z90.710 ACQUIRED ABSENCE OF BOTH CERVIX AND UTERUS: Chronic | ICD-10-CM

## 2023-05-23 DIAGNOSIS — Z96.642 PRESENCE OF LEFT ARTIFICIAL HIP JOINT: Chronic | ICD-10-CM

## 2023-05-23 DIAGNOSIS — M17.12 UNILATERAL PRIMARY OSTEOARTHRITIS, LEFT KNEE: ICD-10-CM

## 2023-05-23 DIAGNOSIS — Z00.8 ENCOUNTER FOR OTHER GENERAL EXAMINATION: ICD-10-CM

## 2023-05-23 DIAGNOSIS — N81.9 FEMALE GENITAL PROLAPSE, UNSPECIFIED: Chronic | ICD-10-CM

## 2023-05-23 PROCEDURE — 73721 MRI JNT OF LWR EXTRE W/O DYE: CPT

## 2023-05-23 PROCEDURE — 73721 MRI JNT OF LWR EXTRE W/O DYE: CPT | Mod: 26,LT

## 2023-05-25 ENCOUNTER — NON-APPOINTMENT (OUTPATIENT)
Age: 63
End: 2023-05-25

## 2023-06-02 ENCOUNTER — APPOINTMENT (OUTPATIENT)
Dept: ORTHOPEDIC SURGERY | Facility: CLINIC | Age: 63
End: 2023-06-02
Payer: COMMERCIAL

## 2023-06-02 VITALS
TEMPERATURE: 98 F | BODY MASS INDEX: 27.92 KG/M2 | SYSTOLIC BLOOD PRESSURE: 155 MMHG | WEIGHT: 195 LBS | OXYGEN SATURATION: 96 % | HEIGHT: 70 IN | DIASTOLIC BLOOD PRESSURE: 89 MMHG | HEART RATE: 56 BPM

## 2023-06-02 PROCEDURE — 99214 OFFICE O/P EST MOD 30 MIN: CPT

## 2023-06-04 ENCOUNTER — RX RENEWAL (OUTPATIENT)
Age: 63
End: 2023-06-04

## 2023-06-11 NOTE — DISCUSSION/SUMMARY
[de-identified] : Catia Last is a 62-year-old female presented to the office for follow-up of her left knee pain.  Patient has been experiencing severe left knee pain that is now affecting her quality of life.  She has tried physical therapy, anti-inflammatories, and injections, without significant relief.  X-rays showed severe left knee osteoarthritis.  MRI showed tricompartmental osteoarthritis, worst in the lateral compartment.  Examination showed tenderness over the knee, but otherwise good knee range of motion. Discussed with patient the examination and imaging findings.  Discussed with patient the operative and nonoperative management of knee osteoarthritis, including total knee arthroplasty.  Discussed the nonoperative management of knee osteoarthritis, including physical therapy, anti-inflammatories, and injections.  Patient has tried nonoperative management, but continues to have knee pain.  Discussed total knee arthroplasty at length, including surgical procedure, hospital course, DVT prophylaxis, antibiotics, physical therapy, recovery, and risks. Patient would like to proceed with total knee arthroplasty.  Discussed that patient will require medical and cardiac clearance prior to surgery.  Discussed obtaining a CT scan prior to surgery.  Of note, patient is unable to take sulfa medications, but is able to take meloxicam.  Patient understanding and in agreement with the plan.  All questions answered. \par \par Discussed the imaging and physical exam findings with the patient consistent with endstage knee degenerative disease. The patient has failed conservative management including physical therapy, anti-inflammatories, and injections. The risks, benefits and alternatives to total knee replacement were discussed with the patient in detail and the patient elected to proceed with surgery. Discussed the surgical plan with the patient including implant options and surgical approach. \par \par Surgical risks including fracture requiring fixation, instability or dislocation, temporary or permanent leg length inequality, infection, bleeding, stiffness, failure to alleviate pain, failure to achieve desired results, need for further surgery, scar tissue formation, hardware failure, chronic pain, injury to nerves resulting in extremity dysfunction, injury to arteries and veins, deep vein thrombosis or pulmonary embolism requiring anticoagulation and medical risk factors including heart attack, stroke, death, neurological injury, pneumonia, kidney or other organ failure were discussed with the patient. \par \par Patient was understanding and in agreement with the treatment plan. All questions answered.\par \par Plan:\par -CT Scan Left Lower Extremity\par -Medical Clearance\par -Cardiac Clearance\par -Left Total Knee Arthroplasty\par \par Surgical Plan:\par Diagnosis: Left Knee Osteoarthritis\par Laterality: Left\par Operative procedure: Left Total Knee Arthroplasty\par Location: [facility]\par \par DVT prophylaxis: Aspirin 325mg twice per day\par TXA: IV\par Plan for discharge: Home \par Pre- & Post Operative Antibiotics: Cefazolin 2g\par No Sulfa Medications (Can Take Meloxicam)\par \par Clearances:\par      Medical: Pending\par      Cardiology: Pending\par \par Comorbidities:\par      Metal Allergy: Negative\par      Chronic Pain: Negative\par      Diabetes: Negative\par      Use of Anticoagulation: Negative\par      Atrial Fibrillation: Negative\par      History of VTE: Negative\par      History of Cardiac Stents: Negative\par      Skin Infections/Open Wounds: Negative\par      MRSA Infection/Colonization: Negative\par      Current Urinary Symptoms: Negative\par      Immunocompromise: Negative\par      Inflammatory Arthritis: Negative\par      Smoking: Negative\par      Drug Use: Negative\par      Alcohol Use: Infrequent\par      Obstructive Sleep Apnea: Negative\par      Neurologic Disease: Negative

## 2023-06-11 NOTE — HISTORY OF PRESENT ILLNESS
[de-identified] : 6/2/2023\par \par Catia Last presents to the office for follow-up of her left knee pain.  Patient continues to have significant left knee pain that is keeping her up at night.  Pain is located to the anterior knee, but is mostly medial at this time.  She does have some posterior knee pain.  She has not noted any relieving factors at this time.  Patient has tried ice.  She has tried physical therapy, injections, and anti-inflammatories, without significant relief.  Patient is unable to take Celebrex because of a sulfa allergy.  She states that the pain is now affecting her quality of life.  Prior knee injection helped for about 1 week.  Patient underwent left knee MRI, which showed osteoarthritis.\par \par 5/5/2023\par Ms. Last is a 62-year-old female presents the office for evaluation of her left knee pain.  In August 2022, patient jumped out of her bed and felt a pop in the knee, causing significant pain.  This pain then resolved.  Her pain returned in November 2022 and she has experienced consistently worsening pain.  Patient has a history of bilateral THAs by Dr. Andres and she was evaluated by Dr. Andres for her knee pain.  Patient was still not she may have a meniscus tear.  She tried physical therapy for about 2.5 months, without significant relief.  Patient underwent corticosteroid injection by Dr. Leal on 3/10/2023, which did not provide significant relief.  She has tried Tylenol and Aleve, with some relief.  Patient has a history of a sulfa allergy and is unable to take Celebrex.  She continues to have significant anterior knee pain and difficulty with standing.  There is pain with activity.  Pain is improved with sitting, rest, and ice.  She states that her pain is now affecting her quality of life.\par \par History: HTN, Migraines, Hypothyroidism, Dilated Aortic Root, HLD, Allergy: Sulfa

## 2023-06-11 NOTE — PHYSICAL EXAM
[de-identified] : Constitutional: 62 year old female, alert and oriented, cooperative, in no acute distress.\par \par HEENT \par NC/AT.  Appearance: symmetric\par \par Neck/Back\par Straight without deformity or instability.  Good ROM.\par \par Chest/Respiratory \par Respiratory effort: no intercostal retractions or use of accessory muscles. Nonlabored Breathing\par \par Skin \par On inspection, warm and dry without rashes or lesions.\par \par Mental Status: \par Judgment, insight: intact\par Orientation: oriented to time, place, and person\par \par Neurological:\par Sensory and Motor are grossly intact throughout\par \par Left Knee\par \par Inspection:\par     Skin intact, no rashes or lesions\par     No Effusion\par     Non-tender to palpation over tibial tubercle, patella, lateral joint line, and pes insertion.\par     Tenderness over the medial joint line (anteriorly)\par \par Range of Motion:\par 	Extension - 0 degrees\par 	Flexion - 120 degrees\par 	Alignment - Valgus 3 degrees\par 	Extensor lag: None\par \par Stability:\par      Demonstrates no Varus or Valgus instability\par      Negative Anterior or Posterior drawer.\par      Negative Lachman's\par      Negative McMurrays\par \par Patella: stable, tracks well. \par \par Neurologic Exam\par     Motor intact including 5/5 Extensor Hallucis Longus, 5/5 Flexor Hallucis Longus, 5/5 Tibialis Anterior and 5/5 Gastrocnemius\par     Sensation Intact to Light Touch including Saphenous, Sural, Superficial Peroneal, Deep Peroneal, Tibial nerve distributions\par \par Vascular Exam\par     Foot is warm and well perfused with 2+ Dorsalis Pedis Pulse \par \par No pain with range of motion of the bilateral hips or right knee. No lumbar paraspinal muscle tenderness.  [de-identified] : XRay:  XRay of the Pelvis (1 View) taken on 5/5/2023. XRays demonstrate Bilateral Total Hip Arthroplasties in good position and alignment. There is no obvious evidence of fracture, dislocation, osteolysis or loosening. (my personal interpretation)\par \par XRay: XRays of the Left Knee (4 Views) taken on 5/5/2023. XRays demonstrate tricompartmental joint space narrowing, with subchondral sclerosis, overlying osteophytes, all consistent with severe osteoarthritis, KL Grade: 3. (my personal interpretation) \par \par \par \par   MR Knee No Cont, Left             Final\par \par No Documents Attached\par \par \par \par \par   EXAM: [id number] - MR KNEE LT  - ORDERED BY:  LURDES HUSSEIN\par \par \par PROCEDURE DATE:  05/23/2023\par \par \par \par INTERPRETATION:  MR KNEE LEFT\par \par CLINICAL INDICATION: Knee pain. "Popping" injury in August 2022. No relief with physical therapy or injections.\par \par TECHNIQUE: Multiplanar, multisequence MRI was obtained of the left  knee.\par \par COMPARISON: Knee radiographs 5/5/2023 (available on [facility]).\par \par FINDINGS:\par \par \par EXTENSOR MECHANISM: The quadriceps and patellar tendons are intact.\par CRUCIATE AND COLLATERAL LIGAMENTS: The ACL, PCL, MCL, and LCL are within normal limits.\par PATELLOFEMORAL COMPARTMENT: High-grade cartilage loss throughout the mid patella and trochlea. Marginal osteophyte formation.\par MEDIAL COMPARTMENT: Linear intermediate signal in the posterior horn which may represent a nondisplaced or healed horizontal tear (5:9). No displaced meniscal tear. Full-thickness chondral loss at the central and posterior weightbearing portions of the medial femoral condyle. Marginal osteophyte formation.\par LATERAL COMPARTMENT: Degenerative tearing of the anterior horn of the lateral meniscus. Full-thickness chondral loss at the posterior weightbearing portion of the lateral femoral condyle and in the central tibia.\par SYNOVIUM/ JOINT FLUID: Moderate joint effusion with synovitis and intra-articular bodies. Multilobulated ganglion cyst arising posterior to the PCL with extension into a marginal osteophyte arising along the posterior aspect of the medial tibial plateau. Multilobulated ganglion cyst contacts the adjacent tibial nerve. Moderate Baker's cyst with intra-articular bodies.\par BONE MARROW: Fibrocystic changes at the PCL tibial insertion. Subchondral cystic changes at the fibular head consistent with overlying chondral loss. No fracture or osteonecrosis.\par MUSCLES: Within normal limits.\par NEUROVASCULAR STRUCTURES: Within normal limits.\par SUBCUTANEOUS SOFT TISSUES: Within normal limits.\par \par IMPRESSION:\par 1.  Moderate tricompartmental osteoarthritis worst at the lateral compartment with associated severe degenerative tearing of the lateral meniscus.\par 2.  Joint effusion containing synovitis and multiple intra-articular bodies. Moderate Baker's cyst with intra-articular bodies.\par \par --- End of Report ---\par \par \par AISHWARYA CORMIER MD; Fellow Radiologist\par This document has been electronically signed.\par ARIADNE HARRIS MD; Attending Radiologist\par This document has been electronically signed. May 25 2023 12:07PM

## 2023-06-16 ENCOUNTER — APPOINTMENT (OUTPATIENT)
Dept: CT IMAGING | Facility: IMAGING CENTER | Age: 63
End: 2023-06-16
Payer: COMMERCIAL

## 2023-06-16 ENCOUNTER — OUTPATIENT (OUTPATIENT)
Dept: OUTPATIENT SERVICES | Facility: HOSPITAL | Age: 63
LOS: 1 days | End: 2023-06-16
Payer: COMMERCIAL

## 2023-06-16 DIAGNOSIS — Z96.642 PRESENCE OF LEFT ARTIFICIAL HIP JOINT: Chronic | ICD-10-CM

## 2023-06-16 DIAGNOSIS — Z90.710 ACQUIRED ABSENCE OF BOTH CERVIX AND UTERUS: Chronic | ICD-10-CM

## 2023-06-16 DIAGNOSIS — M17.12 UNILATERAL PRIMARY OSTEOARTHRITIS, LEFT KNEE: ICD-10-CM

## 2023-06-16 PROCEDURE — 73700 CT LOWER EXTREMITY W/O DYE: CPT

## 2023-06-16 PROCEDURE — 73700 CT LOWER EXTREMITY W/O DYE: CPT | Mod: 26,LT

## 2023-06-19 ENCOUNTER — RX RENEWAL (OUTPATIENT)
Age: 63
End: 2023-06-19

## 2023-06-19 LAB
ALBUMIN SERPL ELPH-MCNC: 4.9 G/DL
ALP BLD-CCNC: 78 U/L
ALT SERPL-CCNC: 54 U/L
ANION GAP SERPL CALC-SCNC: 13 MMOL/L
AST SERPL-CCNC: 38 U/L
BILIRUB SERPL-MCNC: 0.4 MG/DL
BUN SERPL-MCNC: 16 MG/DL
CALCIUM SERPL-MCNC: 10.1 MG/DL
CHLORIDE SERPL-SCNC: 103 MMOL/L
CHOLEST SERPL-MCNC: 200 MG/DL
CO2 SERPL-SCNC: 25 MMOL/L
CREAT SERPL-MCNC: 0.86 MG/DL
EGFR: 75 ML/MIN/1.73M2
GLUCOSE SERPL-MCNC: 91 MG/DL
HDLC SERPL-MCNC: 81 MG/DL
LDLC SERPL CALC-MCNC: 95 MG/DL
NONHDLC SERPL-MCNC: 119 MG/DL
POTASSIUM SERPL-SCNC: 4.4 MMOL/L
PROT SERPL-MCNC: 7.1 G/DL
SODIUM SERPL-SCNC: 141 MMOL/L
TRIGL SERPL-MCNC: 120 MG/DL

## 2023-06-23 ENCOUNTER — NON-APPOINTMENT (OUTPATIENT)
Age: 63
End: 2023-06-23

## 2023-06-23 ENCOUNTER — APPOINTMENT (OUTPATIENT)
Dept: CARDIOLOGY | Facility: CLINIC | Age: 63
End: 2023-06-23
Payer: COMMERCIAL

## 2023-06-23 VITALS
OXYGEN SATURATION: 99 % | SYSTOLIC BLOOD PRESSURE: 152 MMHG | BODY MASS INDEX: 28.49 KG/M2 | HEIGHT: 70 IN | DIASTOLIC BLOOD PRESSURE: 82 MMHG | WEIGHT: 199 LBS | HEART RATE: 49 BPM

## 2023-06-23 DIAGNOSIS — R94.31 ABNORMAL ELECTROCARDIOGRAM [ECG] [EKG]: ICD-10-CM

## 2023-06-23 DIAGNOSIS — E78.5 HYPERLIPIDEMIA, UNSPECIFIED: ICD-10-CM

## 2023-06-23 DIAGNOSIS — E03.9 HYPOTHYROIDISM, UNSPECIFIED: ICD-10-CM

## 2023-06-23 DIAGNOSIS — Z01.810 ENCOUNTER FOR PREPROCEDURAL CARDIOVASCULAR EXAMINATION: ICD-10-CM

## 2023-06-23 PROCEDURE — 93000 ELECTROCARDIOGRAM COMPLETE: CPT | Mod: NC

## 2023-06-23 PROCEDURE — 99214 OFFICE O/P EST MOD 30 MIN: CPT | Mod: 25

## 2023-06-23 RX ORDER — LEVOTHYROXINE SODIUM 0.12 MG/1
125 TABLET ORAL
Qty: 90 | Refills: 3 | Status: DISCONTINUED | COMMUNITY
Start: 2022-11-18 | End: 2023-06-23

## 2023-06-23 NOTE — REVIEW OF SYSTEMS
[Fever] : no fever [Headache] : no headache [Blurry Vision] : no blurred vision [Seeing Double (Diplopia)] : no diplopia [Earache] : no earache [Sore Throat] : no sore throat [SOB] : no shortness of breath [Dyspnea on exertion] : not dyspnea during exertion [Chest Discomfort] : no chest discomfort [Leg Claudication] : no intermittent leg claudication [Syncope] : no syncope [Cough] : no cough [Wheezing] : no wheezing [Abdominal Pain] : no abdominal pain [Change in Appetite] : no change in appetite [Constipation] : no constipation [Dysuria] : no dysuria [Pelvic Pain] : no pelvic pain [Abn Vaginal Bleeding] : no unexplained vaginal bleeding [Joint Pain] : joint pain [Joint Stiffness] : joint stiffness [Confusion] : no confusion was observed [Memory Lapses Or Loss] : no memory lapses or loss [Depression] : no depression [Easy Bleeding] : no tendency for easy bleeding [Swollen Glands] : no swollen glands [Easy Bruising] : no tendency for easy bruising [Negative] : Psychiatric

## 2023-06-23 NOTE — PHYSICAL EXAM
[Well Developed] : well developed [Well Nourished] : well nourished [No Acute Distress] : no acute distress [Normal Conjunctiva] : normal conjunctiva [Normal Venous Pressure] : normal venous pressure [No Carotid Bruit] : no carotid bruit [Normal S1, S2] : normal S1, S2 [No Rub] : no rub [No Gallop] : no gallop [Murmur] : murmur [Clear Lung Fields] : clear lung fields [Good Air Entry] : good air entry [No Respiratory Distress] : no respiratory distress  [Soft] : abdomen soft [Non Tender] : non-tender [Normal Bowel Sounds] : normal bowel sounds [Normal Gait] : normal gait [No Edema] : no edema [No Cyanosis] : no cyanosis [No Clubbing] : no clubbing [No Varicosities] : no varicosities [Normal Radial B/L] : normal radial B/L [Normal PT B/L] : normal PT B/L [No Rash] : no rash [No Skin Lesions] : no skin lesions [Moves all extremities] : moves all extremities [No Focal Deficits] : no focal deficits [Normal Speech] : normal speech [Alert and Oriented] : alert and oriented [Normal memory] : normal memory [de-identified] : 1/6/SM

## 2023-06-23 NOTE — CARDIOLOGY SUMMARY
[de-identified] : 6/2/323 sinus rhythm Poor R wave progression ,  TWI anterior leads V1 to V3  [de-identified] : 6/15/21  Normal chemical nuclear stress test  [de-identified] : 6/9/22   moderately dilated aortic root 4.4 cm ,Dilated ascending aorta 4.2 cm , normal left ventricular systolic function

## 2023-06-23 NOTE — ASSESSMENT
[FreeTextEntry1] : 63 year old female  above hx \par \par without active cardiac symptoms who has abnormal ekg  marked bradycardia due to BB   with normal nuclear stress test , normal LVEF   , will decrease dose to 25 mg po daily \par \par HTN  :  uncontrolled   ,  encourage to be compliant to low salt diet   will decrease  toprol dose to 25 mg po daily , add  spironolactone 25 mg po daily ,  \par \par Dilated aortic root and ascending aorta   : close monitoring the size with periodic echocardiogram , strict bp control , continue statin \par \par HLD : improved  continue rosuvastatin  with diet  restriction  will check blood work \par \par Hypothyroidism : controlled continue  medication \par \par \par patient is overall optimal for procedure , patient is low risk for intermediate risk surgery \par

## 2023-06-23 NOTE — HISTORY OF PRESENT ILLNESS
[FreeTextEntry1] : 62 year  old female  with hx of HTN HLD  esophageal spasm rare episode GERD  hypothyroidism  came for  pre op cardiac evaluation for left knee replacement scheduled for july 10 th ,  , other wise says she is feeling fine , her blood pressure markedly elevated 156/94 mm hg , \par \par Patient denies active cardiac symptoms currently , patient did have hx of esophageal spasm associated with chest pain resolves with drinking water , denies any exertional chest pain or shortness of breath or dizziness or palpitation , though her physical exertion is limited due to hip pain \par \par Patient had normal chemical stress test ,\par \par Patient had echo showed dilated aortic root 4.4 cm  and  4.2 cm ascending aorta  same as prior echo \par \par Patient  contrast  CT chest   root  size 4.2 cm , ascending aorta 4.1 cm \par \par Patient ekg  is abnormal some what similar to prior ekg   her cholesterol  level 175  LDL  87  \par \par patient does have migraine attacks  , now she is tolerating ,rosuvastatin 10 mg po daily \par \par

## 2023-06-27 ENCOUNTER — NON-APPOINTMENT (OUTPATIENT)
Age: 63
End: 2023-06-27

## 2023-06-27 LAB — TSH SERPL-ACNC: 5.85 UIU/ML

## 2023-06-28 ENCOUNTER — OUTPATIENT (OUTPATIENT)
Dept: OUTPATIENT SERVICES | Facility: HOSPITAL | Age: 63
LOS: 1 days | End: 2023-06-28

## 2023-06-28 ENCOUNTER — APPOINTMENT (OUTPATIENT)
Dept: FAMILY MEDICINE | Facility: CLINIC | Age: 63
End: 2023-06-28
Payer: COMMERCIAL

## 2023-06-28 VITALS
HEIGHT: 69 IN | OXYGEN SATURATION: 98 % | SYSTOLIC BLOOD PRESSURE: 125 MMHG | DIASTOLIC BLOOD PRESSURE: 83 MMHG | HEART RATE: 59 BPM | TEMPERATURE: 98 F | RESPIRATION RATE: 16 BRPM | WEIGHT: 197.98 LBS

## 2023-06-28 VITALS
OXYGEN SATURATION: 98 % | BODY MASS INDEX: 28.41 KG/M2 | TEMPERATURE: 98.2 F | RESPIRATION RATE: 16 BRPM | WEIGHT: 198 LBS | DIASTOLIC BLOOD PRESSURE: 72 MMHG | SYSTOLIC BLOOD PRESSURE: 122 MMHG | HEART RATE: 57 BPM

## 2023-06-28 DIAGNOSIS — M17.12 UNILATERAL PRIMARY OSTEOARTHRITIS, LEFT KNEE: ICD-10-CM

## 2023-06-28 DIAGNOSIS — N81.9 FEMALE GENITAL PROLAPSE, UNSPECIFIED: Chronic | ICD-10-CM

## 2023-06-28 DIAGNOSIS — Z96.641 PRESENCE OF RIGHT ARTIFICIAL HIP JOINT: Chronic | ICD-10-CM

## 2023-06-28 DIAGNOSIS — I10 ESSENTIAL (PRIMARY) HYPERTENSION: ICD-10-CM

## 2023-06-28 DIAGNOSIS — R79.89 OTHER SPECIFIED ABNORMAL FINDINGS OF BLOOD CHEMISTRY: ICD-10-CM

## 2023-06-28 DIAGNOSIS — R74.01 ELEVATION OF LEVELS OF LIVER TRANSAMINASE LEVELS: ICD-10-CM

## 2023-06-28 DIAGNOSIS — E03.9 HYPOTHYROIDISM, UNSPECIFIED: ICD-10-CM

## 2023-06-28 DIAGNOSIS — Z96.642 PRESENCE OF LEFT ARTIFICIAL HIP JOINT: Chronic | ICD-10-CM

## 2023-06-28 DIAGNOSIS — Z90.710 ACQUIRED ABSENCE OF BOTH CERVIX AND UTERUS: Chronic | ICD-10-CM

## 2023-06-28 LAB
A1C WITH ESTIMATED AVERAGE GLUCOSE RESULT: 5.3 % — SIGNIFICANT CHANGE UP (ref 4–5.6)
APPEARANCE UR: CLEAR — SIGNIFICANT CHANGE UP
BILIRUB UR-MCNC: NEGATIVE — SIGNIFICANT CHANGE UP
BLD GP AB SCN SERPL QL: NEGATIVE — SIGNIFICANT CHANGE UP
COLOR SPEC: SIGNIFICANT CHANGE UP
DIFF PNL FLD: NEGATIVE — SIGNIFICANT CHANGE UP
ESTIMATED AVERAGE GLUCOSE: 105 — SIGNIFICANT CHANGE UP
GLUCOSE UR QL: NEGATIVE — SIGNIFICANT CHANGE UP
KETONES UR-MCNC: NEGATIVE — SIGNIFICANT CHANGE UP
LEUKOCYTE ESTERASE UR-ACNC: NEGATIVE — SIGNIFICANT CHANGE UP
MRSA PCR RESULT.: SIGNIFICANT CHANGE UP
NITRITE UR-MCNC: NEGATIVE — SIGNIFICANT CHANGE UP
PH UR: 6 — SIGNIFICANT CHANGE UP (ref 5–8)
PROT UR-MCNC: NEGATIVE — SIGNIFICANT CHANGE UP
RH IG SCN BLD-IMP: POSITIVE — SIGNIFICANT CHANGE UP
S AUREUS DNA NOSE QL NAA+PROBE: SIGNIFICANT CHANGE UP
SP GR SPEC: 1.02 — SIGNIFICANT CHANGE UP (ref 1.01–1.05)
UROBILINOGEN FLD QL: SIGNIFICANT CHANGE UP

## 2023-06-28 PROCEDURE — 99214 OFFICE O/P EST MOD 30 MIN: CPT

## 2023-06-28 RX ORDER — ZOLPIDEM TARTRATE 10 MG/1
1 TABLET ORAL
Qty: 0 | Refills: 0 | DISCHARGE

## 2023-06-28 RX ORDER — CHLORHEXIDINE GLUCONATE 213 G/1000ML
1 SOLUTION TOPICAL ONCE
Refills: 0 | Status: COMPLETED | OUTPATIENT
Start: 2023-07-10 | End: 2023-07-10

## 2023-06-28 RX ORDER — SODIUM CHLORIDE 9 MG/ML
1000 INJECTION, SOLUTION INTRAVENOUS
Refills: 0 | Status: DISCONTINUED | OUTPATIENT
Start: 2023-07-10 | End: 2023-07-11

## 2023-06-28 RX ORDER — ESTRADIOL 4 UG/1
1 INSERT VAGINAL
Qty: 0 | Refills: 0 | DISCHARGE

## 2023-06-28 RX ORDER — GABAPENTIN 400 MG/1
3 CAPSULE ORAL
Qty: 0 | Refills: 0 | DISCHARGE

## 2023-06-28 RX ORDER — LOSARTAN POTASSIUM 100 MG/1
1 TABLET, FILM COATED ORAL
Qty: 0 | Refills: 0 | DISCHARGE

## 2023-06-28 RX ORDER — PANTOPRAZOLE SODIUM 20 MG/1
40 TABLET, DELAYED RELEASE ORAL ONCE
Refills: 0 | Status: DISCONTINUED | OUTPATIENT
Start: 2023-07-10 | End: 2023-07-10

## 2023-06-28 RX ORDER — LEVOTHYROXINE SODIUM 125 MCG
1 TABLET ORAL
Refills: 0 | DISCHARGE

## 2023-06-28 RX ORDER — SODIUM CHLORIDE 9 MG/ML
3 INJECTION INTRAMUSCULAR; INTRAVENOUS; SUBCUTANEOUS EVERY 8 HOURS
Refills: 0 | Status: DISCONTINUED | OUTPATIENT
Start: 2023-07-10 | End: 2023-07-11

## 2023-06-28 RX ORDER — ZOLMITRIPTAN 5 MG/1
0 TABLET ORAL
Qty: 0 | Refills: 0 | DISCHARGE

## 2023-06-28 RX ORDER — TRAMADOL HYDROCHLORIDE 50 MG/1
50 TABLET ORAL ONCE
Refills: 0 | Status: DISCONTINUED | OUTPATIENT
Start: 2023-07-10 | End: 2023-07-10

## 2023-06-28 NOTE — H&P PST ADULT - MUSCULOSKELETAL COMMENTS
+ crepitus with flexion and extension of left knee. preop dx of unilateral primary OA, left knee. see HPI

## 2023-06-28 NOTE — H&P PST ADULT - ATTENDING COMMENTS
Catia Last is a 62 year old female, past medical history of hypertension, migraines, hypothyroidism, dilated aortic root, hyperlipidemia, Sulfa Allergy, who presented to the office for evaluation of her left knee pain. Patient has been experiencing left knee pain for about 1 year. Pain is located over the anterior, posterior and medial knee. Pain can cause difficulty with sleeping. Patient tried physical therapy, anti-inflammatories and injections. Her last corticosteroid injection was on 3/10/2023, but did not provide significant relief. Pain is now affecting her quality of life. XRays showed severe left knee osteoarthritis. MRI showed tricompartmental osteoarthritis. Patient was indicated for Left Total Knee Arthroplasty. Patient was cleared by Medicine and Cardiology.    Discussed the operative and nonoperative management of knee osteoarthritis at length with the patient. Nonoperative management would consist of pain control, physical therapy, and anti-inflammatories. The patient had failed conservative management, including physical therapy, anti-inflammatories, and injections. The patient did not want to continue nonoperative management due to the impact knee pain has had on the patient’s quality of life. Operative management would consist of total knee arthroplasty. The risks, benefits and alternatives to total knee arthroplasty were discussed with the patient in detail and the patient elected to proceed with surgery. Discussed the surgical plan and implants with the patient, including robotic assisted total knee arthroplasty. Discussed the recovery process following total knee arthroplasty at length, including, but not limited to, inpatient hospital stay, physical therapy,    recovery, antibiotics, and DVT prophylaxis. Surgical risks including fracture requiring fixation, instability or dislocation, temporary or permanent leg length inequality, risks of cementation, infection, bleeding, stiffness, failure to alleviate pain, failure to achieve desired results, need for further surgery, scar tissue formation, hardware failure, component loosening, chronic pain, injury to nerves resulting in extremity dysfunction, injury to arteries and veins, deep vein thrombosis or pulmonary embolism requiring anticoagulation and medical risk factors including heart attack, stroke, death, neurological injury, pneumonia, kidney or other organ failure were discussed with the patient at length.    Following discussion, patient elected to proceed with Left Total Knee Arthroplasty with BAKARI Robotic Assistance. Informed consent was obtained. Patient's leg was then marked with verbal confirmation of the patient. Patient was understanding and in agreement with the operative plan. All questions were answered.    Physical Exam:  Skin intact, no erythema  Motor: Intact EHL/FHL/Tibialis Anterior/Gastrocnemius  Sensory: Intact Superficial Peroneal/Deep Peroneal/Saphenous/Sural/Tibial Nerves  Vascular: Foot warm, well perfused    Assessment/Plan:  Catia Last is a 63 year old female who presented for a Left Total Knee Arthroplasty with BAKARI Robotic Assistance    Plan:  -Left Total Knee Arthroplasty with BAKARI Robotic Assistance  -Clearance in chart

## 2023-06-28 NOTE — H&P PST ADULT - MUSCULOSKELETAL
details… left knee/no calf tenderness/joint swelling/joint warmth/strength 5/5 bilateral upper extremities/back exam

## 2023-06-28 NOTE — H&P PST ADULT - NSICDXPASTMEDICALHX_GEN_ALL_CORE_FT
PAST MEDICAL HISTORY:  Ascending aorta dilation     Bulging lumbar disc L4-5; treated with PINA (last in June 2013)    Cervical disc disease herniated discs; unsure levels (asymptomatic)    Cluster headache     COVID-19 vaccine series completed moderna 4/2/21    Diverticulosis asymptomatic    Esophageal spasm endoscopy and colonoscopy negative; no reflux    History of recurrent UTIs stable since colpopexy    HTN (hypertension)     Hyperlipemia     Hypothyroid     Osteoarthritis hip    Restless leg     Unilateral primary osteoarthritis, left knee

## 2023-06-28 NOTE — H&P PST ADULT - NEGATIVE ENMT SYMPTOMS
no hearing difficulty/no ear pain/no nasal congestion/no nose bleeds/no gum bleeding/no throat pain/no dysphagia

## 2023-06-28 NOTE — H&P PST ADULT - HISTORY OF PRESENT ILLNESS
64 y/o female presents to PST preop for left total knee arthroplasty. Pt reports progressively worsening left knee pain x 1 year. Xray of left knee revealed "severe osteoarthritis". Pt reports despite conservative measures (Tylenol, aleve and steroid injections) left knee pain persists.

## 2023-06-28 NOTE — H&P PST ADULT - NSICDXPASTSURGICALHX_GEN_ALL_CORE_FT
PAST SURGICAL HISTORY:  History of total left hip replacement 2014    Normal vaginal delivery x 3    Pelvic prolapse laparoscopic colpopexy 2019    S/P hip replacement, right     S/P hysterectomy 2004 after prolapse

## 2023-06-28 NOTE — H&P PST ADULT - ASSESSMENT
62 y/o female presents to PST preop for left total knee arthroplasty. Pt reports progressively worsening left knee pain x 1 year. Xray of left knee revealed "severe osteoarthritis". Pt reports despite conservative measures (Tylenol, aleve and steroid injections) left knee pain persists.

## 2023-06-28 NOTE — H&P PST ADULT - PROBLEM SELECTOR PLAN 1
preop for left total knee arthroplasty on 7/10/23  preop instructions given, pt verbalized understanding  GI prophylaxis and chlorhexidine wash provided  cbc, cmp results in chart from 6/14/23  hga1c, type, UA, urine culture & MRSA swab pending   cardiac evaluation in chart  medical evaluation requested- pt has abnormal TSH and medication dose recently adjusted by PCP

## 2023-06-28 NOTE — H&P PST ADULT - NEGATIVE GASTROINTESTINAL SYMPTOMS
Patient attended session 23 of Cardiac Rehab Phase 2. See scanned in exercise session report in the Media tab.     no nausea/no vomiting/no diarrhea/no constipation/no change in bowel habits/no abdominal pain

## 2023-06-29 ENCOUNTER — APPOINTMENT (OUTPATIENT)
Dept: FAMILY MEDICINE | Facility: CLINIC | Age: 63
End: 2023-06-29

## 2023-06-29 LAB
CULTURE RESULTS: SIGNIFICANT CHANGE UP
SPECIMEN SOURCE: SIGNIFICANT CHANGE UP

## 2023-06-30 ENCOUNTER — APPOINTMENT (OUTPATIENT)
Dept: CARDIOLOGY | Facility: CLINIC | Age: 63
End: 2023-06-30
Payer: COMMERCIAL

## 2023-06-30 PROCEDURE — 93306 TTE W/DOPPLER COMPLETE: CPT

## 2023-07-01 ENCOUNTER — NON-APPOINTMENT (OUTPATIENT)
Age: 63
End: 2023-07-01

## 2023-07-01 DIAGNOSIS — I77.810 THORACIC AORTIC ECTASIA: ICD-10-CM

## 2023-07-01 LAB
ALBUMIN SERPL ELPH-MCNC: 4.6 G/DL
ALP BLD-CCNC: 76 U/L
ALT SERPL-CCNC: 132 U/L
ANION GAP SERPL CALC-SCNC: 10 MMOL/L
APTT BLD: 34.8 SEC
AST SERPL-CCNC: 62 U/L
BILIRUB SERPL-MCNC: 0.5 MG/DL
BUN SERPL-MCNC: 21 MG/DL
CALCIUM SERPL-MCNC: 10.1 MG/DL
CHLORIDE SERPL-SCNC: 102 MMOL/L
CO2 SERPL-SCNC: 26 MMOL/L
CREAT SERPL-MCNC: 0.89 MG/DL
EGFR: 72 ML/MIN/1.73M2
GLUCOSE SERPL-MCNC: 79 MG/DL
INR PPP: 0.99 RATIO
POTASSIUM SERPL-SCNC: 5.2 MMOL/L
PROT SERPL-MCNC: 6.8 G/DL
PT BLD: 11.6 SEC
SODIUM SERPL-SCNC: 137 MMOL/L

## 2023-07-05 PROBLEM — R74.01 ELEVATED AST (SGOT): Status: ACTIVE | Noted: 2023-07-01

## 2023-07-05 PROBLEM — R74.01 TRANSAMINITIS: Status: ACTIVE | Noted: 2023-07-05

## 2023-07-05 LAB
ALBUMIN SERPL ELPH-MCNC: 4.8 G/DL
ALP BLD-CCNC: 82 U/L
ALT SERPL-CCNC: 101 U/L
ANION GAP SERPL CALC-SCNC: 10 MMOL/L
AST SERPL-CCNC: 49 U/L
BILIRUB SERPL-MCNC: 0.3 MG/DL
BUN SERPL-MCNC: 16 MG/DL
CALCIUM SERPL-MCNC: 10 MG/DL
CHLORIDE SERPL-SCNC: 101 MMOL/L
CO2 SERPL-SCNC: 27 MMOL/L
CREAT SERPL-MCNC: 0.78 MG/DL
EGFR: 85 ML/MIN/1.73M2
GLUCOSE SERPL-MCNC: 83 MG/DL
POTASSIUM SERPL-SCNC: 4.2 MMOL/L
PROT SERPL-MCNC: 7 G/DL
SODIUM SERPL-SCNC: 139 MMOL/L

## 2023-07-06 ENCOUNTER — NON-APPOINTMENT (OUTPATIENT)
Age: 63
End: 2023-07-06

## 2023-07-06 NOTE — REVIEW OF SYSTEMS
[Nocturia] : nocturia [Joint Pain] : joint pain [Joint Stiffness] : joint stiffness [Joint Swelling] : joint swelling [Insomnia] : insomnia [Fever] : no fever [Chills] : no chills [Fatigue] : no fatigue [Hot Flashes] : no hot flashes [Night Sweats] : no night sweats [Recent Change In Weight] : ~T no recent weight change [Discharge] : no discharge [Pain] : no pain [Redness] : no redness [Dryness] : no dryness  [Vision Problems] : no vision problems [Itching] : no itching [Earache] : no earache [Hearing Loss] : no hearing loss [Nosebleed] : no nosebleeds [Hoarseness] : no hoarseness [Nasal Discharge] : no nasal discharge [Sore Throat] : no sore throat [Postnasal Drip] : no postnasal drip [Chest Pain] : no chest pain [Palpitations] : no palpitations [Leg Claudication] : no leg claudication [Lower Ext Edema] : no lower extremity edema [Shortness Of Breath] : no shortness of breath [Wheezing] : no wheezing [Cough] : no cough [Dyspnea on Exertion] : no dyspnea on exertion [Abdominal Pain] : no abdominal pain [Nausea] : no nausea [Constipation] : no constipation [Diarrhea] : diarrhea [Vomiting] : no vomiting [Heartburn] : no heartburn [Melena] : no melena [Dysuria] : no dysuria [Hematuria] : no hematuria [Incontinence] : no incontinence [Frequency] : no frequency [Vaginal Discharge] : no vaginal discharge [Muscle Weakness] : no muscle weakness [Muscle Pain] : no muscle pain [Back Pain] : no back pain [Itching] : no itching [Mole Changes] : no mole changes [Nail Changes] : no nail changes [Hair Changes] : no hair changes [Skin Rash] : no skin rash [Headache] : no headache [Dizziness] : no dizziness [Fainting] : no fainting [Confusion] : no confusion [Memory Loss] : no memory loss [Suicidal] : not suicidal [Anxiety] : no anxiety [Depression] : no depression [Easy Bleeding] : no easy bleeding [Easy Bruising] : no easy bruising [Swollen Glands] : no swollen glands [FreeTextEntry8] : no pmb-uterus removed, occ nocturia

## 2023-07-06 NOTE — HISTORY OF PRESENT ILLNESS
[(Patient denies any chest pain, claudication, dyspnea on exertion, orthopnea, palpitations or syncope)] : Patient denies any chest pain, claudication, dyspnea on exertion, orthopnea, palpitations or syncope [Moderate (4-6 METs)] : Moderate (4-6 METs) [Aortic Stenosis] : no aortic stenosis [Atrial Fibrillation] : no atrial fibrillation [Coronary Artery Disease] : no coronary artery disease [Recent Myocardial Infarction] : no recent myocardial infarction [Implantable Device/Pacemaker] : no implantable device/pacemaker [Asthma] : no asthma [COPD] : no COPD [Sleep Apnea] : no sleep apnea [Smoker] : not a smoker [Family Member] : no family member with adverse anesthesia reaction/sudden death [Self] : no previous adverse anesthesia reaction [Chronic Anticoagulation] : no chronic anticoagulation [Chronic Kidney Disease] : no chronic kidney disease [Diabetes] : no diabetes [FreeTextEntry1] : left knee TKR [FreeTextEntry2] : 7/10/2023 [FreeTextEntry3] : Dr. Atilio Graham

## 2023-07-06 NOTE — ASSESSMENT
[High Risk Surgery - Intraperitoneal, Intrathoracic or Supringuinal Vascular Procedures] : High Risk Surgery - Intraperitoneal, Intrathoracic or Supringuinal Vascular Procedures - No (0) [Ischemic Heart Disease] : Ischemic Heart Disease - No (0) [Congestive Heart Failure] : Congestive Heart Failure - No (0) [Prior Cerebrovascular Accident or TIA] : Prior Cerebrovascular Accident or TIA - No (0) [Creatinine >= 2mg/dL (1 Point)] : Creatinine >= 2mg/dL - No (0) [Insulin-dependent Diabetic (1 Point)] : Insulin-dependent Diabetic - No (0) [0] : 0 , RCRI Class: I, Risk of Post-Op Cardiac Complications: 3.9%, 95% CI for Risk Estimate: 2.8% - 5.4% [Patient Optimized for Surgery] : Patient optimized for surgery [As per surgery] : as per surgery [FreeTextEntry2] : Take levothyroxine 112 mcg, losartan potassium 100 mg, metoprolol succinate 25 mg , and spironolactone 25 mg with only sips of water morning of procedure

## 2023-07-06 NOTE — PHYSICAL EXAM
[No Acute Distress] : no acute distress [Well Nourished] : well nourished [Well Developed] : well developed [Well-Appearing] : well-appearing [Normal Voice/Communication] : normal voice/communication [Normal Sclera/Conjunctiva] : normal sclera/conjunctiva [PERRL] : pupils equal round and reactive to light [EOMI] : extraocular movements intact [Normal Outer Ear/Nose] : the outer ears and nose were normal in appearance [Normal Oropharynx] : the oropharynx was normal [Normal TMs] : both tympanic membranes were normal [No JVD] : no jugular venous distention [No Lymphadenopathy] : no lymphadenopathy [Supple] : supple [No Respiratory Distress] : no respiratory distress  [Thyroid Normal, No Nodules] : the thyroid was normal and there were no nodules present [No Accessory Muscle Use] : no accessory muscle use [Clear to Auscultation] : lungs were clear to auscultation bilaterally [Normal Rate] : normal rate  [Regular Rhythm] : with a regular rhythm [Normal S1, S2] : normal S1 and S2 [No Edema] : there was no peripheral edema [Pedal Pulses Present] : the pedal pulses are present [No Extremity Clubbing/Cyanosis] : no extremity clubbing/cyanosis [Soft] : abdomen soft [Non Tender] : non-tender [Non-distended] : non-distended [No Masses] : no abdominal mass palpated [No HSM] : no HSM [Normal Supraclavicular Nodes] : no supraclavicular lymphadenopathy [Normal Anterior Cervical Nodes] : no anterior cervical lymphadenopathy [No Rash] : no rash [Coordination Grossly Intact] : coordination grossly intact [No Focal Deficits] : no focal deficits [Normal Gait] : normal gait [Speech Grossly Normal] : speech grossly normal [Memory Grossly Normal] : memory grossly normal [Normal Affect] : the affect was normal [Alert and Oriented x3] : oriented to person, place, and time [Normal Mood] : the mood was normal [Normal Insight/Judgement] : insight and judgment were intact [de-identified] : +Raynaud's phenomenon of toes-sl purplish discoloration, slightly cool to touch, pedal pulses normal

## 2023-07-06 NOTE — PLAN
[FreeTextEntry1] : await repeat LFTs, coagulation testing\par LFTs slightly elevated, will repeat off statin and will stop tylenol and NSAIDs\par repeat ECHO-aorta 4.5 cm still, cardiology note appreciated\par \par Reepat LFTs downtrending off statin and Tylenol. Presumed statin-induced transaminitis, will continue to hold statin and Tylenol. Will follow up after surgery. \par \par Patient is a moderate-risk patient for a moderate-risk procedure. \par

## 2023-07-07 PROBLEM — G25.81 RESTLESS LEGS SYNDROME: Chronic | Status: ACTIVE | Noted: 2023-06-28

## 2023-07-07 PROBLEM — I77.810 THORACIC AORTIC ECTASIA: Chronic | Status: ACTIVE | Noted: 2023-06-28

## 2023-07-07 PROBLEM — M17.12 UNILATERAL PRIMARY OSTEOARTHRITIS, LEFT KNEE: Chronic | Status: ACTIVE | Noted: 2023-06-28

## 2023-07-09 ENCOUNTER — TRANSCRIPTION ENCOUNTER (OUTPATIENT)
Age: 63
End: 2023-07-09

## 2023-07-10 ENCOUNTER — APPOINTMENT (OUTPATIENT)
Dept: ORTHOPEDIC SURGERY | Facility: HOSPITAL | Age: 63
End: 2023-07-10

## 2023-07-10 ENCOUNTER — INPATIENT (INPATIENT)
Facility: HOSPITAL | Age: 63
LOS: 0 days | Discharge: HOME HEALTH | End: 2023-07-11
Attending: STUDENT IN AN ORGANIZED HEALTH CARE EDUCATION/TRAINING PROGRAM | Admitting: STUDENT IN AN ORGANIZED HEALTH CARE EDUCATION/TRAINING PROGRAM
Payer: COMMERCIAL

## 2023-07-10 ENCOUNTER — TRANSCRIPTION ENCOUNTER (OUTPATIENT)
Age: 63
End: 2023-07-10

## 2023-07-10 VITALS
OXYGEN SATURATION: 100 % | SYSTOLIC BLOOD PRESSURE: 113 MMHG | RESPIRATION RATE: 18 BRPM | WEIGHT: 197.98 LBS | DIASTOLIC BLOOD PRESSURE: 69 MMHG | HEIGHT: 69 IN | HEART RATE: 58 BPM | TEMPERATURE: 98 F

## 2023-07-10 DIAGNOSIS — M17.12 UNILATERAL PRIMARY OSTEOARTHRITIS, LEFT KNEE: ICD-10-CM

## 2023-07-10 DIAGNOSIS — Z96.642 PRESENCE OF LEFT ARTIFICIAL HIP JOINT: Chronic | ICD-10-CM

## 2023-07-10 DIAGNOSIS — Z96.641 PRESENCE OF RIGHT ARTIFICIAL HIP JOINT: Chronic | ICD-10-CM

## 2023-07-10 DIAGNOSIS — N81.9 FEMALE GENITAL PROLAPSE, UNSPECIFIED: Chronic | ICD-10-CM

## 2023-07-10 DIAGNOSIS — Z90.710 ACQUIRED ABSENCE OF BOTH CERVIX AND UTERUS: Chronic | ICD-10-CM

## 2023-07-10 LAB
GLUCOSE BLDC GLUCOMTR-MCNC: 80 MG/DL — SIGNIFICANT CHANGE UP (ref 70–99)
RH IG SCN BLD-IMP: POSITIVE — SIGNIFICANT CHANGE UP

## 2023-07-10 PROCEDURE — 73560 X-RAY EXAM OF KNEE 1 OR 2: CPT | Mod: 26,LT

## 2023-07-10 PROCEDURE — 20985 CPTR-ASST DIR MS PX: CPT | Mod: NC

## 2023-07-10 PROCEDURE — 27447 TOTAL KNEE ARTHROPLASTY: CPT | Mod: LT

## 2023-07-10 DEVICE — BASEPLATE TIB UNIV TRIATHLON SZ 5: Type: IMPLANTABLE DEVICE | Status: FUNCTIONAL

## 2023-07-10 DEVICE — INSERT TIB BEARING PS X3 SZ 5 13MM: Type: IMPLANTABLE DEVICE | Status: FUNCTIONAL

## 2023-07-10 DEVICE — MAKO BONE PIN 4MM X 110MM: Type: IMPLANTABLE DEVICE | Status: FUNCTIONAL

## 2023-07-10 DEVICE — MAKO BONE PIN 4MM X 140MM: Type: IMPLANTABLE DEVICE | Status: FUNCTIONAL

## 2023-07-10 DEVICE — PIN PINABALL PRELOADABLE 90MMX3.2MM: Type: IMPLANTABLE DEVICE | Status: FUNCTIONAL

## 2023-07-10 DEVICE — COMP PATELLA ASYMMETRIC X3 32X10MM: Type: IMPLANTABLE DEVICE | Status: FUNCTIONAL

## 2023-07-10 DEVICE — COMP FEM TRIATHLON PS SZ 5 LT: Type: IMPLANTABLE DEVICE | Status: FUNCTIONAL

## 2023-07-10 DEVICE — FEM DIST FIXATION PEG MOD TKS: Type: IMPLANTABLE DEVICE | Status: FUNCTIONAL

## 2023-07-10 DEVICE — CEMENT SIMPLEX P 40GM: Type: IMPLANTABLE DEVICE | Status: FUNCTIONAL

## 2023-07-10 RX ORDER — OXYCODONE HYDROCHLORIDE 5 MG/1
5 TABLET ORAL EVERY 4 HOURS
Refills: 0 | Status: DISCONTINUED | OUTPATIENT
Start: 2023-07-10 | End: 2023-07-11

## 2023-07-10 RX ORDER — HYDROMORPHONE HYDROCHLORIDE 2 MG/ML
0.5 INJECTION INTRAMUSCULAR; INTRAVENOUS; SUBCUTANEOUS ONCE
Refills: 0 | Status: DISCONTINUED | OUTPATIENT
Start: 2023-07-10 | End: 2023-07-11

## 2023-07-10 RX ORDER — SENNA PLUS 8.6 MG/1
2 TABLET ORAL AT BEDTIME
Refills: 0 | Status: DISCONTINUED | OUTPATIENT
Start: 2023-07-10 | End: 2023-07-11

## 2023-07-10 RX ORDER — HYDRALAZINE HCL 50 MG
5 TABLET ORAL ONCE
Refills: 0 | Status: COMPLETED | OUTPATIENT
Start: 2023-07-10 | End: 2023-07-10

## 2023-07-10 RX ORDER — DEXAMETHASONE 0.5 MG/5ML
8 ELIXIR ORAL ONCE
Refills: 0 | Status: COMPLETED | OUTPATIENT
Start: 2023-07-11 | End: 2023-07-11

## 2023-07-10 RX ORDER — FENTANYL CITRATE 50 UG/ML
25 INJECTION INTRAVENOUS
Refills: 0 | Status: DISCONTINUED | OUTPATIENT
Start: 2023-07-10 | End: 2023-07-10

## 2023-07-10 RX ORDER — KETOROLAC TROMETHAMINE 30 MG/ML
15 SYRINGE (ML) INJECTION EVERY 6 HOURS
Refills: 0 | Status: DISCONTINUED | OUTPATIENT
Start: 2023-07-10 | End: 2023-07-10

## 2023-07-10 RX ORDER — POLYETHYLENE GLYCOL 3350 17 G/17G
17 POWDER, FOR SOLUTION ORAL AT BEDTIME
Refills: 0 | Status: DISCONTINUED | OUTPATIENT
Start: 2023-07-10 | End: 2023-07-11

## 2023-07-10 RX ORDER — LEVOTHYROXINE SODIUM 125 MCG
1 TABLET ORAL
Refills: 0 | DISCHARGE

## 2023-07-10 RX ORDER — MAGNESIUM HYDROXIDE 400 MG/1
30 TABLET, CHEWABLE ORAL DAILY
Refills: 0 | Status: DISCONTINUED | OUTPATIENT
Start: 2023-07-10 | End: 2023-07-11

## 2023-07-10 RX ORDER — SODIUM CHLORIDE 9 MG/ML
500 INJECTION, SOLUTION INTRAVENOUS ONCE
Refills: 0 | Status: COMPLETED | OUTPATIENT
Start: 2023-07-10 | End: 2023-07-10

## 2023-07-10 RX ORDER — SODIUM CHLORIDE 9 MG/ML
500 INJECTION, SOLUTION INTRAVENOUS ONCE
Refills: 0 | Status: COMPLETED | OUTPATIENT
Start: 2023-07-10 | End: 2023-07-11

## 2023-07-10 RX ORDER — OXYCODONE HYDROCHLORIDE 5 MG/1
10 TABLET ORAL EVERY 4 HOURS
Refills: 0 | Status: DISCONTINUED | OUTPATIENT
Start: 2023-07-10 | End: 2023-07-11

## 2023-07-10 RX ORDER — METOPROLOL TARTRATE 50 MG
1 TABLET ORAL
Refills: 0 | DISCHARGE

## 2023-07-10 RX ORDER — OXYCODONE HYDROCHLORIDE 5 MG/1
5 TABLET ORAL ONCE
Refills: 0 | Status: DISCONTINUED | OUTPATIENT
Start: 2023-07-10 | End: 2023-07-10

## 2023-07-10 RX ORDER — PANTOPRAZOLE SODIUM 20 MG/1
40 TABLET, DELAYED RELEASE ORAL ONCE
Refills: 0 | Status: COMPLETED | OUTPATIENT
Start: 2023-07-10 | End: 2023-07-10

## 2023-07-10 RX ORDER — TRAMADOL HYDROCHLORIDE 50 MG/1
50 TABLET ORAL EVERY 6 HOURS
Refills: 0 | Status: DISCONTINUED | OUTPATIENT
Start: 2023-07-10 | End: 2023-07-11

## 2023-07-10 RX ORDER — ONDANSETRON 8 MG/1
4 TABLET, FILM COATED ORAL EVERY 6 HOURS
Refills: 0 | Status: DISCONTINUED | OUTPATIENT
Start: 2023-07-10 | End: 2023-07-11

## 2023-07-10 RX ORDER — PANTOPRAZOLE SODIUM 20 MG/1
40 TABLET, DELAYED RELEASE ORAL
Refills: 0 | Status: DISCONTINUED | OUTPATIENT
Start: 2023-07-10 | End: 2023-07-11

## 2023-07-10 RX ORDER — ONDANSETRON 8 MG/1
4 TABLET, FILM COATED ORAL ONCE
Refills: 0 | Status: DISCONTINUED | OUTPATIENT
Start: 2023-07-10 | End: 2023-07-10

## 2023-07-10 RX ORDER — CEFAZOLIN SODIUM 1 G
2000 VIAL (EA) INJECTION EVERY 8 HOURS
Refills: 0 | Status: COMPLETED | OUTPATIENT
Start: 2023-07-10 | End: 2023-07-11

## 2023-07-10 RX ORDER — GABAPENTIN 400 MG/1
0 CAPSULE ORAL
Refills: 0 | DISCHARGE

## 2023-07-10 RX ORDER — ASPIRIN/CALCIUM CARB/MAGNESIUM 324 MG
325 TABLET ORAL EVERY 12 HOURS
Refills: 0 | Status: DISCONTINUED | OUTPATIENT
Start: 2023-07-10 | End: 2023-07-11

## 2023-07-10 RX ORDER — KETOROLAC TROMETHAMINE 30 MG/ML
15 SYRINGE (ML) INJECTION EVERY 6 HOURS
Refills: 0 | Status: COMPLETED | OUTPATIENT
Start: 2023-07-11 | End: 2023-07-11

## 2023-07-10 RX ORDER — HYDROMORPHONE HYDROCHLORIDE 2 MG/ML
0.5 INJECTION INTRAMUSCULAR; INTRAVENOUS; SUBCUTANEOUS ONCE
Refills: 0 | Status: DISCONTINUED | OUTPATIENT
Start: 2023-07-10 | End: 2023-07-10

## 2023-07-10 RX ORDER — GABAPENTIN 400 MG/1
1 CAPSULE ORAL
Refills: 0 | DISCHARGE

## 2023-07-10 RX ORDER — IBUPROFEN 200 MG
600 TABLET ORAL EVERY 6 HOURS
Refills: 0 | Status: DISCONTINUED | OUTPATIENT
Start: 2023-07-12 | End: 2023-07-11

## 2023-07-10 RX ADMIN — Medication 325 MILLIGRAM(S): at 21:01

## 2023-07-10 RX ADMIN — FENTANYL CITRATE 25 MICROGRAM(S): 50 INJECTION INTRAVENOUS at 19:00

## 2023-07-10 RX ADMIN — HYDROMORPHONE HYDROCHLORIDE 0.5 MILLIGRAM(S): 2 INJECTION INTRAMUSCULAR; INTRAVENOUS; SUBCUTANEOUS at 19:45

## 2023-07-10 RX ADMIN — SODIUM CHLORIDE 1000 MILLILITER(S): 9 INJECTION, SOLUTION INTRAVENOUS at 18:05

## 2023-07-10 RX ADMIN — SODIUM CHLORIDE 30 MILLILITER(S): 9 INJECTION, SOLUTION INTRAVENOUS at 22:47

## 2023-07-10 RX ADMIN — SODIUM CHLORIDE 1000 MILLILITER(S): 9 INJECTION, SOLUTION INTRAVENOUS at 22:48

## 2023-07-10 RX ADMIN — SODIUM CHLORIDE 3 MILLILITER(S): 9 INJECTION INTRAMUSCULAR; INTRAVENOUS; SUBCUTANEOUS at 20:54

## 2023-07-10 RX ADMIN — FENTANYL CITRATE 25 MICROGRAM(S): 50 INJECTION INTRAVENOUS at 19:06

## 2023-07-10 RX ADMIN — HYDROMORPHONE HYDROCHLORIDE 0.5 MILLIGRAM(S): 2 INJECTION INTRAMUSCULAR; INTRAVENOUS; SUBCUTANEOUS at 19:23

## 2023-07-10 RX ADMIN — TRAMADOL HYDROCHLORIDE 50 MILLIGRAM(S): 50 TABLET ORAL at 10:29

## 2023-07-10 RX ADMIN — PANTOPRAZOLE SODIUM 40 MILLIGRAM(S): 20 TABLET, DELAYED RELEASE ORAL at 10:29

## 2023-07-10 RX ADMIN — FENTANYL CITRATE 25 MICROGRAM(S): 50 INJECTION INTRAVENOUS at 19:05

## 2023-07-10 RX ADMIN — Medication 100 MILLIGRAM(S): at 22:47

## 2023-07-10 RX ADMIN — CHLORHEXIDINE GLUCONATE 1 APPLICATION(S): 213 SOLUTION TOPICAL at 10:32

## 2023-07-10 RX ADMIN — OXYCODONE HYDROCHLORIDE 5 MILLIGRAM(S): 5 TABLET ORAL at 20:20

## 2023-07-10 RX ADMIN — Medication 5 MILLIGRAM(S): at 20:24

## 2023-07-10 RX ADMIN — TRAMADOL HYDROCHLORIDE 50 MILLIGRAM(S): 50 TABLET ORAL at 21:01

## 2023-07-10 RX ADMIN — OXYCODONE HYDROCHLORIDE 5 MILLIGRAM(S): 5 TABLET ORAL at 19:50

## 2023-07-10 RX ADMIN — TRAMADOL HYDROCHLORIDE 50 MILLIGRAM(S): 50 TABLET ORAL at 21:31

## 2023-07-10 RX ADMIN — FENTANYL CITRATE 25 MICROGRAM(S): 50 INJECTION INTRAVENOUS at 19:15

## 2023-07-10 RX ADMIN — SODIUM CHLORIDE 30 MILLILITER(S): 9 INJECTION, SOLUTION INTRAVENOUS at 10:30

## 2023-07-10 NOTE — DISCHARGE NOTE PROVIDER - NSDCCPCAREPLAN_GEN_ALL_CORE_FT
PRINCIPAL DISCHARGE DIAGNOSIS  Diagnosis: OA (osteoarthritis)  Assessment and Plan of Treatment: Diet: Continue regular diet upon discharge.   Activity: No heavy lifting > 25 lbs for 4 weeks. Avoid straining or excessive activity x 6 weeks.   -Continue to use your walker when ambulating until your postoperative follow up appointment.   Dressings: Keep dressing clean, dry, and intact. Your doctor will remove your bandage at your post-operative follow up appointment.   Other Care:   -You may shower when you get home but DO NOT soak dressing and/or incision. The water may run over your dressing/incision but DO NOT let the water directly hit your dressing/incision (take a shower with your wound away from the direct stream of water). NO hot tubes, NO bath rubs, NO swimming pools.   -Elevate your operative leg 2 feet above heart level for 2 hours in the morning, 2 hours in the afternoon, and 2 hours in the evening.   -Apply ice for 20min every time you elevate.   -Sit for 90 min/day: 45mins x2 or 30min x3  -DO NOT sit for more than the 90min/day. Walk or lay down when not elevating your leg.   -DO NOT place the elevation pillow behind your knees. Only place it under your calf and heel.   -DO NOT bend more than 45 degrees at the waist

## 2023-07-10 NOTE — DISCHARGE NOTE PROVIDER - CARE PROVIDER_API CALL
Atilio Graham  Orthopaedic Surgery  [address] [place] [address], [address]  [place] [address]  Phone: [phone]  Fax: [phone]  Established Patient  Follow Up Time:

## 2023-07-10 NOTE — DISCHARGE NOTE PROVIDER - NSDCCPTREATMENT_GEN_ALL_CORE_FT
PRINCIPAL PROCEDURE  Procedure: Knee replacement, total  Findings and Treatment: Pain control:    Standing:        -Tramadol 50mg - 1 tab every 6 hours - Take only if needed for MODERATE pain       -oxycodone 5mg - 1 tab every 4-6 hours - Take only if needed for SEVERE or BREAKTHROUGH pain  Oxycodone and Tramadol have been sent to your pharmacy. Please do not drive, operate machinery, or make important decisions while taking these medications.   Other Medications: (Standing)  -Aspirin (Enteric Coated) 325mg every 12 hours - to prevent blood clots (for 6 weeks post operatively.)  -Protonix 40mg - 1 tab every 24 hours - to prevent stomach irritation/ulcers  -Senna 8.6mg - 2 pills every 24 hours - stool softener  -Miralax 17g - daily - constipation   Follow up: Please follow up at your prescheduled post-operative follow up appointment with Dr. Graham for 2 weeks after hospital discharge. Please call with any questions or concerns including fevers, worsening pain, pus from the wounds, redness of the skin and difficulty breathing or heaviness in the chest at [phone].     PRINCIPAL PROCEDURE  Procedure: Knee replacement, total  Findings and Treatment: This is a 64yo Female with PMH of ascending aorta dilation, DDD, cluster HA, recurrent UTIs, HTN, HLD, hyperthyroidism, and diverticulitis who presents to [facility] for [facility]opedic surgery. Patient s/p left total knee arthroplasty with Dr. Graham on 7/10/2023. Patient tolerated the procedure well without any intraoperative complications. Patient tolerated physical therapy well, and the pain was controlled. Patient is weight bearing as tolerated with cane/walker as needed. Seen by medical attending for continuity of care and management and cleared for safe discharge. Keep dressing/incision clean, dry and intact. Any suture/staples to be removed on post-op day #14 at your office visit. Patient is on 325mg of ASA for DVT prophylaxis, please take for 6 weeks unless otherwise instructed by your surgeon. Please follow up with Dr. Graham in 2 weeks. Please follow up with your PMD for continuity of care and management as medications may have changed.

## 2023-07-10 NOTE — BRIEF OPERATIVE NOTE - ASSISTANT(S)
[facility]PITAL[facility] DISCHARGE SUMMARY    ADMISSION DATE:  6/29/2022  DISCHARGE DATE:  7/2/2022  2:35 PM    DISCHARGING PHYSICIAN:  Prashant Dominguez MD    CONSULTS:    IP CONSULT TO GI  IP CONSULT TO GENERAL SURGERY  IP CONSULT TO OB GYN  IP CONSULT TO INFECTIOUS DISEASES  IP CONSULT TO INTERVENTIONAL RADIOLOGY       DISCHARGE DISPOSITION:  Home       CONDITION AT DISCHARGE:    stable        DISCHARGE DIAGNOSIS & HOSPITAL COURSE  33 year old female who presented to the ED with concerns of abdominal pain.    Early of parital SBO - Resolved  Patient with history of ectopic pregnancy surgery  CT of abdomen and pelvis without contrast upon admission also showed borderline left jejunal dilatation which possibly could be ileus versus early or partial SBO.  General surgery consulted.    NG tube placed at the ED. Now removed.             Symptoms improving  Tolerating diet     Initial concern for rectosimoid colitis - Ruled out  Pelvic ascites  CT of abdomen and pelvis without contrast upon admission showed abnormal appearance of sigmoid colon, rectum.  Infectious versus inflammatory colitis.  It also showed pelvic ascites.  No hemoperitoneum.  IR consulted for possible paracentesis; pelvic fluid too small to drain at this time.  ID consulted.   GI consulted  S/p colonoscopy with no evidence of colitis. (7/1)  To complete course of Augmentin at home     SIRS  WBC 15, transient tachycardia up to 90 upon admission  WBC as high as 20.6, now improving  Lactic acid within normal limits  Procalcitonin level negative  Blood culture negative  Urinalysis with large blood, >100 RBCs, 6-10 WBCs; Negative leukocyte esterase, negative nitrate     Metal foreign body(surgical suture needle) in left upper abdominal wall subcutaneous region  CT of abdomen and pelvis with metal foreign body in the ventral left paramedian subcutaneous fat mid to upper abdomen at level L2 having sickle appearance.  X-ray of abdomen with surgical needle foreign body in  the subcutaneous fat of the left paramedian ventral upper abdomen; it was not present on CT from 05/10/2009.             General surgery consulted as above     Hematemesis  GI consulted.  Discussed with Dr. Haley.  Likely Funmilayo Julius tear. No need for EGD.     Abnormal vaginal bleeding  Gyn consulted.             Recommends outpatient workup.               Acute on chronic normocytic anemia  Hemoglobin 7.8 upon admission.  Previous hemoglobin has been fluctuating from 7.2-10 in the past.  Hb remaining stable.      Severe hypokalemia  Hypomagnesemia  Hypophosphatemia  Given repletion     Hyponatremia  Likely SIADH     Thrombocytosis  Platelet level elevated at 451 upon admission  Likely reactive     Mild intermittent asthma  No acute issues currently.  Nebs as needed.      OBJECTIVE:    VITALS:    Patient Vitals for the past 24 hrs:   BP Temp Temp src Pulse Resp SpO2 Weight   07/02/22 1152 104/63 99.1 °F (37.3 °C) Oral 84 18 99 % --   07/02/22 1009 104/66 -- -- 88 -- -- --   07/02/22 0746 104/62 98.6 °F (37 °C) Oral 90 18 98 % --   07/02/22 0609 108/60 -- -- 87 -- -- --   07/02/22 0600 -- -- -- -- -- -- 54.6 kg (120 lb 5.9 oz)      Gen:  Not in acute distress, Non-obese  HEENT: anicteric, mmm, No conjunctival pallor/injection,  Heme: No lymphadenopathy, no bruises, no bleeding, no pallor  C/V: No elevated jugular venous pressure, No carotid bruits, S1, S2, No m/r/g, No LE edema  Lungs: Clear to auscultation b/l  Abd: +BS,  nondistended, soft,  mild tenderness to palpation in the lower abdominal region bilaterally, no rebound or guarding, No organomegaly  : No CVAT, No suprapubic tenderness  Ext: No joint erythema/swelling/effusion  Skin: No rash, no jaundice  Neuro:  AAO x3, CN grossly intact, strength and sensations normal              Labs      Recent Labs     06/30/22  0817 06/30/22  1458 07/01/22  0546 07/01/22  1215 07/01/22  2000 07/02/22  0545   SODIUM 138  --  134*  --   --  131*   POTASSIUM 2.9*   < >  3.0* 3.4 3.2* 3.1*   CO2 28  --  26  --   --  27   ANIONGAP 15  --  15  --   --  12   GLUCOSE 75  --  69*  --   --  102*   BUN 5*  --  3*  --   --  3*   CREATININE 0.68  --  0.60  --   --  0.50*   BCRAT 7  --  5*  --   --  6*   CALCIUM 8.9  --  8.6  --   --  8.9   BILIRUBIN 0.3  --   --   --   --   --    AST 15  --   --   --   --   --    GPT 12  --   --   --   --   --    ALKPT 55  --   --   --   --   --    GLOB 4.9*  --   --   --   --   --    AGR 0.6*  --   --   --   --   --     < > = values in this interval not displayed.               Recent Labs     06/30/22  0817 07/01/22  0546 07/02/22  0545   WBC 20.6* 14.9* 12.6*   RBC 3.20* 2.81* 2.91*   HGB 8.7* 7.7* 8.0*   HCT 27.5* 24.4* 25.1*   * 419 458*   MCV 85.9 86.8 86.3   MCH 27.2 27.4 27.5   MCHC 31.6* 31.6* 31.9*   NRBCRE 0 0 0          Imaging  CT ABDOMEN PELVIS WO CONTRAST  Impression:   1.  Abnormal appearance of the sigmoid colon, rectum, and adjacent mesentery.  Infectious or inflammatory colitis suspected.  2.  Pelvic ascites, no definite hemoperitoneum as suspected and reported in the preliminary report.  3.  Ovaries prominent but have normal morphology on pelvic ultrasound.  4.  Borderline left jejunal dilatation which may represent peristalsis.  Reflex ileus or early or partial small bowel obstruction not entirely excluded, consider abdomen x-ray follow-up.   5.  Probable small uterine fibroid not detected at pelvic ultrasound.   6.  Nonspecific retroperitoneal adenopathy reactive.   7.  Caveat: Sensitivity of this study for a variety of pathology is  diminished due to the absence of contrast material..  Signed on: 6/30/2022 7:57 AM      XR CHEST PA OR AP 1 VIEW  Impression: FINDINGS AND IMPRESSION::  Alimentary tube projecting upward in the upper esophagus.  Lungs clear, prominent right nipple shadow.  Support apparatus overlies.  Metal foreign body in projection of left upper abdomen.  Signed on: 6/30/2022 6:52 AM      XR ABDOMEN 1  VIEW  Impression: FINDINGS AND IMPRESSION::  There is a surgical needle foreign body in the subcutaneous fat of the left paramedian ventral upper abdomen; it was not present on CT from 05/10/2009.  New alimentary tube is coiled in esophagus with tip projecting upward.  A more recent abdomen x-ray has shown that this has been repositioned successfully.    Low abdomen excluded from field-of-view, visualized upper abdominal bowel gas pattern is normal.    Signed on: 6/30/2022 6:29 AM      XR CHEST PA OR AP 1 VIEW  Impression: FINDINGS with IMPRESSION:  NG tube tip in projection of stomach.    Lungs clear, heart size normal.  Metal foreign body in left upper abdomen.  Support apparatus overlies.  Signed on: 6/30/2022 6:17 AM      NM GI BLOOD LOSS  Impression:  FINDINGS AND IMPRESSION::  No abnormal activity is detected.  Nothing to explain GI bleeding.  If bleeding becomes active today, follow-up scanning could be performed today.  Signed on: 6/30/2022 6:09 AM       DISCHARGE MEDICATIONS:       Summary of your Discharge Medications      Take these Medications      Details   amoxicillin-clavulanate 875-125 MG per tablet  Commonly known as: Augmentin   Take 1 tablet by mouth in the morning and 1 tablet before bedtime. Do all this for 5 days.               DISCHARGE INSTRUCTIONS:      FOLLOW-UP:    Primary care doctor    Follow up      Wali Barbour MD  [address] [place] [id number]  [phone]    Schedule an appointment as soon as possible for a visit  Gynecology follow up for evaluation of abnormal vaginal bleeding    Your surgeon who did your previous surgery    Schedule an appointment as soon as possible for a visit  To have a surgical needle removed from your left upper abdominal wall.    No discharge procedures on file.     PCP: No Pcp       TIME TAKEN FOR DISCHARGE:  40 min  Face-to-face time spent greater than 50%    Prashant Dominguez MD  7/3/2022    This note was generated in part using \"Dragon\" voice  recognition technology. The report was reviewed by this physician, but still may have unintentional errors due to inherent limitations of voice recognition technology   Mark, PGY4  Rhonda, PGY3  Lacy, PS4

## 2023-07-10 NOTE — DISCHARGE NOTE PROVIDER - NSDCMRMEDTOKEN_GEN_ALL_CORE_FT
Aleve 220 mg oral capsule: 1 tab(s) orally prn  gabapentin 600 mg oral tablet: 1 tab(s) orally 3 times a day  levothyroxine 112 mcg (0.112 mg) oral tablet: 1 tab(s) orally 5 times a week  levothyroxine 125 mcg (0.125 mg) oral tablet: 1 tab(s) orally 2 times a week  losartan 100 mg oral tablet: 1 tab(s) orally once a day (in the morning)  metoprolol succinate 50 mg oral tablet, extended release: 1 tab(s) orally once a day  rosuvastatin 10 mg oral tablet: 1 tab(s) orally once a day (in the morning)  spironolactone 25 mg oral tablet: 1 tab(s) orally once a day (in the morning)  SUMAtriptan 100 mg oral tablet: 1 tab(s) orally prn  Tylenol 500mg 1 tab prn:    aspirin 325 mg oral delayed release tablet: 1 tab(s) orally every 12 hours MDD: 2 tabs  gabapentin 600 mg oral tablet: 1 tab(s) orally 3 times a day  levothyroxine 112 mcg (0.112 mg) oral tablet: 1 tab(s) orally 5 times a week  levothyroxine 125 mcg (0.125 mg) oral tablet: 1 tab(s) orally 2 times a week  metoprolol succinate 50 mg oral tablet, extended release: 1 tab(s) orally once a day  Outpatient Physical Therapy: outpatient PT s/p Left TKA MDD: 1  oxyCODONE 5 mg oral tablet: 1 tab(s) orally every 4 hours as needed for Moderate-Severe Pain MDD: 6 tabs  pantoprazole 40 mg oral delayed release tablet: 1 tab(s) orally once a day (before a meal) MDD: 1 tab  polyethylene glycol 3350 oral powder for reconstitution: 17 gram(s) orally once a day (at bedtime) MDD: 17 grams  senna leaf extract oral tablet: 2 tab(s) orally once a day (at bedtime) MDD: 2 tabs  traMADol 50 mg oral tablet: 1 tab(s) orally every 8 hours MDD: 3 tabs

## 2023-07-10 NOTE — DISCHARGE NOTE PROVIDER - HOSPITAL COURSE
This is a 62yo Female with PMH of ascending aorta dilation, DDD, cluster HA, recurrent UTIs, HTN, HLD, hyperthyroidism, and diverticulitis who presents to [facility] for orthopedic surgery. Patient s/p left TKA with Dr. Graham on 7/10/2023. Patient tolerated the procedure well without any intraoperative complications. Patient tolerated physical therapy well, and the pain was controlled. Patient is weight bearing as tolerated with cane/walker as needed. Seen by medical attending for continuity of care and management and cleared for safe discharge. Keep dressing/incision clean, dry and intact. Any suture/staples to be removed on post-op day #14 at your office visit. Patient is on 325mg of ASA for DVT prophylaxis, please take for 6 weeks unless otherwise instructed by your surgeon. Please follow up with Dr. Graham in 2 weeks. Please follow up with your PMD for continuity of care and management as medications may have changed.

## 2023-07-10 NOTE — PATIENT PROFILE ADULT - FALL HARM RISK - UNIVERSAL INTERVENTIONS
Bed in lowest position, wheels locked, appropriate side rails in place/Call bell, personal items and telephone in reach/Instruct patient to call for assistance before getting out of bed or chair/Non-slip footwear when patient is out of bed/[place] to call system/Physically safe environment - no spills, clutter or unnecessary equipment/Purposeful Proactive Rounding/Room/bathroom lighting operational, light cord in reach

## 2023-07-10 NOTE — PROGRESS NOTE ADULT - SUBJECTIVE AND OBJECTIVE BOX
Orthopedics Post-Op Check:  Patient was seen and examined at bedside. Denies CP/SOB/Dizziness/N/V/D/HA. Pain is well controlled at the moment.    Vital Signs Last 24 Hrs  T(C): 36.5 (10 Jul 2023 20:00), Max: 36.5 (10 Jul 2023 08:44)  T(F): 97.7 (10 Jul 2023 20:00), Max: 97.7 (10 Jul 2023 08:44)  HR: 51 (10 Jul 2023 20:00) (48 - 69)  BP: 150/92 (10 Jul 2023 20:00) (113/66 - 169/91)  BP(mean): 106 (10 Jul 2023 20:00) (76 - 111)  RR: 15 (10 Jul 2023 20:00) (10 - 20)  SpO2: 100% (10 Jul 2023 20:00) (95% - 100%)    Parameters below as of 10 Jul 2023 19:15  Patient On (Oxygen Delivery Method): room air    Labs: FU AM    Physical Exam:  Gen: NAD, A&Ox3  L LE:   Dressing C/D/I  Motor intact + EHL/FHL/TA/GS. Sensation is grossly intact.   Compartments are soft, extremities are warm, DP 2+

## 2023-07-10 NOTE — DISCHARGE NOTE PROVIDER - NSDCFUSCHEDAPPT_GEN_ALL_CORE_FT
Atilio Graham  [facility]  ORTHOSURG [address] [facility]  Scheduled Appointment: 07/25/2023    Palla, Venugopal R  [facility]  CARDIOLOGY [address] [facility]  Scheduled Appointment: 08/04/2023    Yasir Andres  [facility]  ORTHOSURG [address] [facility]  Scheduled Appointment: 09/11/2023

## 2023-07-11 ENCOUNTER — TRANSCRIPTION ENCOUNTER (OUTPATIENT)
Age: 63
End: 2023-07-11

## 2023-07-11 VITALS
SYSTOLIC BLOOD PRESSURE: 138 MMHG | TEMPERATURE: 98 F | RESPIRATION RATE: 18 BRPM | DIASTOLIC BLOOD PRESSURE: 83 MMHG | HEART RATE: 69 BPM | OXYGEN SATURATION: 98 %

## 2023-07-11 DIAGNOSIS — M17.12 UNILATERAL PRIMARY OSTEOARTHRITIS, LEFT KNEE: ICD-10-CM

## 2023-07-11 DIAGNOSIS — R74.01 ELEVATION OF LEVELS OF LIVER TRANSAMINASE LEVELS: ICD-10-CM

## 2023-07-11 DIAGNOSIS — E78.5 HYPERLIPIDEMIA, UNSPECIFIED: ICD-10-CM

## 2023-07-11 DIAGNOSIS — Z29.9 ENCOUNTER FOR PROPHYLACTIC MEASURES, UNSPECIFIED: ICD-10-CM

## 2023-07-11 DIAGNOSIS — E03.9 HYPOTHYROIDISM, UNSPECIFIED: ICD-10-CM

## 2023-07-11 DIAGNOSIS — I10 ESSENTIAL (PRIMARY) HYPERTENSION: ICD-10-CM

## 2023-07-11 LAB
ALBUMIN SERPL ELPH-MCNC: 4.2 G/DL — SIGNIFICANT CHANGE UP (ref 3.3–5)
ALP SERPL-CCNC: 64 U/L — SIGNIFICANT CHANGE UP (ref 40–120)
ALT FLD-CCNC: 37 U/L — HIGH (ref 4–33)
ANION GAP SERPL CALC-SCNC: 13 MMOL/L — SIGNIFICANT CHANGE UP (ref 7–14)
AST SERPL-CCNC: 23 U/L — SIGNIFICANT CHANGE UP (ref 4–32)
BILIRUB DIRECT SERPL-MCNC: <0.2 MG/DL — SIGNIFICANT CHANGE UP (ref 0–0.3)
BILIRUB INDIRECT FLD-MCNC: >0 MG/DL — SIGNIFICANT CHANGE UP (ref 0–1)
BILIRUB SERPL-MCNC: 0.2 MG/DL — SIGNIFICANT CHANGE UP (ref 0.2–1.2)
BUN SERPL-MCNC: 12 MG/DL — SIGNIFICANT CHANGE UP (ref 7–23)
CALCIUM SERPL-MCNC: 9.4 MG/DL — SIGNIFICANT CHANGE UP (ref 8.4–10.5)
CHLORIDE SERPL-SCNC: 99 MMOL/L — SIGNIFICANT CHANGE UP (ref 98–107)
CO2 SERPL-SCNC: 24 MMOL/L — SIGNIFICANT CHANGE UP (ref 22–31)
CREAT SERPL-MCNC: 0.69 MG/DL — SIGNIFICANT CHANGE UP (ref 0.5–1.3)
EGFR: 97 ML/MIN/1.73M2 — SIGNIFICANT CHANGE UP
GLUCOSE SERPL-MCNC: 121 MG/DL — HIGH (ref 70–99)
HCT VFR BLD CALC: 36.1 % — SIGNIFICANT CHANGE UP (ref 34.5–45)
HGB BLD-MCNC: 11.7 G/DL — SIGNIFICANT CHANGE UP (ref 11.5–15.5)
MCHC RBC-ENTMCNC: 31 PG — SIGNIFICANT CHANGE UP (ref 27–34)
MCHC RBC-ENTMCNC: 32.4 GM/DL — SIGNIFICANT CHANGE UP (ref 32–36)
MCV RBC AUTO: 95.5 FL — SIGNIFICANT CHANGE UP (ref 80–100)
NRBC # BLD: 0 /100 WBCS — SIGNIFICANT CHANGE UP (ref 0–0)
NRBC # FLD: 0 K/UL — SIGNIFICANT CHANGE UP (ref 0–0)
PLATELET # BLD AUTO: 221 K/UL — SIGNIFICANT CHANGE UP (ref 150–400)
POTASSIUM SERPL-MCNC: 4 MMOL/L — SIGNIFICANT CHANGE UP (ref 3.5–5.3)
POTASSIUM SERPL-SCNC: 4 MMOL/L — SIGNIFICANT CHANGE UP (ref 3.5–5.3)
PROT SERPL-MCNC: 6.1 G/DL — SIGNIFICANT CHANGE UP (ref 6–8.3)
RBC # BLD: 3.78 M/UL — LOW (ref 3.8–5.2)
RBC # FLD: 13.2 % — SIGNIFICANT CHANGE UP (ref 10.3–14.5)
SODIUM SERPL-SCNC: 136 MMOL/L — SIGNIFICANT CHANGE UP (ref 135–145)
WBC # BLD: 8.7 K/UL — SIGNIFICANT CHANGE UP (ref 3.8–10.5)
WBC # FLD AUTO: 8.7 K/UL — SIGNIFICANT CHANGE UP (ref 3.8–10.5)

## 2023-07-11 PROCEDURE — 99223 1ST HOSP IP/OBS HIGH 75: CPT

## 2023-07-11 RX ORDER — NALOXONE HYDROCHLORIDE 4 MG/.1ML
4 SPRAY NASAL
Qty: 0.1 | Refills: 0
Start: 2023-07-11 | End: 2023-07-11

## 2023-07-11 RX ORDER — SENNA PLUS 8.6 MG/1
2 TABLET ORAL
Qty: 14 | Refills: 0
Start: 2023-07-11 | End: 2023-07-17

## 2023-07-11 RX ORDER — LOSARTAN POTASSIUM 100 MG/1
1 TABLET, FILM COATED ORAL
Refills: 0 | DISCHARGE

## 2023-07-11 RX ORDER — OXYCODONE HYDROCHLORIDE 5 MG/1
1 TABLET ORAL
Qty: 42 | Refills: 0
Start: 2023-07-11 | End: 2023-07-17

## 2023-07-11 RX ORDER — PANTOPRAZOLE SODIUM 20 MG/1
1 TABLET, DELAYED RELEASE ORAL
Qty: 42 | Refills: 0
Start: 2023-07-11 | End: 2023-08-21

## 2023-07-11 RX ORDER — SPIRONOLACTONE 25 MG/1
1 TABLET, FILM COATED ORAL
Refills: 0 | DISCHARGE

## 2023-07-11 RX ORDER — SUMATRIPTAN SUCCINATE 4 MG/.5ML
1 INJECTION, SOLUTION SUBCUTANEOUS
Refills: 0 | DISCHARGE

## 2023-07-11 RX ORDER — POLYETHYLENE GLYCOL 3350 17 G/17G
17 POWDER, FOR SOLUTION ORAL
Qty: 119 | Refills: 0
Start: 2023-07-11 | End: 2023-07-17

## 2023-07-11 RX ORDER — TRAMADOL HYDROCHLORIDE 50 MG/1
1 TABLET ORAL
Qty: 21 | Refills: 0
Start: 2023-07-11 | End: 2023-07-17

## 2023-07-11 RX ORDER — ROSUVASTATIN CALCIUM 5 MG/1
1 TABLET ORAL
Refills: 0 | DISCHARGE

## 2023-07-11 RX ORDER — ASPIRIN/CALCIUM CARB/MAGNESIUM 324 MG
1 TABLET ORAL
Qty: 84 | Refills: 0
Start: 2023-07-11 | End: 2023-08-21

## 2023-07-11 RX ADMIN — OXYCODONE HYDROCHLORIDE 10 MILLIGRAM(S): 5 TABLET ORAL at 02:10

## 2023-07-11 RX ADMIN — Medication 100 MILLIGRAM(S): at 05:39

## 2023-07-11 RX ADMIN — Medication 325 MILLIGRAM(S): at 12:00

## 2023-07-11 RX ADMIN — Medication 15 MILLIGRAM(S): at 05:39

## 2023-07-11 RX ADMIN — Medication 15 MILLIGRAM(S): at 06:21

## 2023-07-11 RX ADMIN — SODIUM CHLORIDE 3 MILLILITER(S): 9 INJECTION INTRAMUSCULAR; INTRAVENOUS; SUBCUTANEOUS at 13:01

## 2023-07-11 RX ADMIN — OXYCODONE HYDROCHLORIDE 10 MILLIGRAM(S): 5 TABLET ORAL at 01:10

## 2023-07-11 RX ADMIN — SODIUM CHLORIDE 3 MILLILITER(S): 9 INJECTION INTRAMUSCULAR; INTRAVENOUS; SUBCUTANEOUS at 06:56

## 2023-07-11 RX ADMIN — OXYCODONE HYDROCHLORIDE 10 MILLIGRAM(S): 5 TABLET ORAL at 05:21

## 2023-07-11 RX ADMIN — Medication 15 MILLIGRAM(S): at 00:01

## 2023-07-11 RX ADMIN — Medication 15 MILLIGRAM(S): at 00:23

## 2023-07-11 RX ADMIN — PANTOPRAZOLE SODIUM 40 MILLIGRAM(S): 20 TABLET, DELAYED RELEASE ORAL at 05:39

## 2023-07-11 RX ADMIN — OXYCODONE HYDROCHLORIDE 10 MILLIGRAM(S): 5 TABLET ORAL at 06:20

## 2023-07-11 RX ADMIN — SODIUM CHLORIDE 1000 MILLILITER(S): 9 INJECTION, SOLUTION INTRAVENOUS at 05:40

## 2023-07-11 RX ADMIN — Medication 101.6 MILLIGRAM(S): at 05:40

## 2023-07-11 NOTE — CONSULT NOTE ADULT - PROBLEM SELECTOR RECOMMENDATION 3
BP stable  - no longer taking losartan  - can resume Toprol XL at lower dose of 25mg daily and titrate as needed  - monitor vital signs

## 2023-07-11 NOTE — OCCUPATIONAL THERAPY INITIAL EVALUATION ADULT - PREDICTED DURATION OF THERAPY (DAYS/WKS), OT EVAL
Pt is currently performing most ADLs and functional mobility tasks independently. No further skilled OT needs.

## 2023-07-11 NOTE — OCCUPATIONAL THERAPY INITIAL EVALUATION ADULT - LIVES WITH, PROFILE
Pt reports she lives with her  in a private house. Pt has +1 step to manage at entrance and 15+ to the second floor where the bathroom/bedroom is located.

## 2023-07-11 NOTE — CONSULT NOTE ADULT - SUBJECTIVE AND OBJECTIVE BOX
Cristy Nieves MD  [facility] Division of Hospital Medicine  Pager [phone] (M-F 8AM-5PM)  Other Times: [phone]    Patient is a 63y old  Female who presents with a chief complaint of s/p left TKA (10 Jul 2023 20:13)    HPI: 63F with hx of HTN, HLD, Hypothyroidism, left knee arthritis who presents for L. TKA. Pt reports progressively worsening left knee pain x 1 year. Xray of left knee revealed "severe osteoarthritis". Pt reports despite conservative measures (Tylenol, aleve and steroid injections) left knee pain persists. Patient seen POD1, doing well, pain controlled.       Review of Systems:   CONSTITUTIONAL: No fever, no fatigue  EYES: No eye pain or discharge  ENMT:  No sinus or throat pain  NECK: No pain or stiffness  RESPIRATORY: No cough, wheezing, chills or hemoptysis; No shortness of breath  CARDIOVASCULAR: No chest pain, palpitations, dizziness, or leg swelling  GASTROINTESTINAL: No abdominal or epigastric pain. No nausea, vomiting, or hematemesis; No diarrhea or constipation. No melena or hematochezia.  GENITOURINARY: No dysuria or incontinence  MUSCULOSKELETAL: No joint pain or swelling; No muscle, back, or extremity pain  NEUROLOGICAL: No headaches, memory loss, loss of strength, numbness, or tremors  PSYCHIATRIC: No depression, anxiety, mood swings, or difficulty sleeping  SKIN: No rashes, no skin changes    PAST MEDICAL & SURGICAL HISTORY:  Osteoarthritis  hip      Hypothyroid      HTN (hypertension)      Hyperlipemia      Cluster headache      Bulging lumbar disc  L4-5; treated with PINA (last in June 2013)      Esophageal spasm  endoscopy and colonoscopy negative; no reflux      Diverticulosis  asymptomatic      Cervical disc disease  herniated discs; unsure levels (asymptomatic)      History of recurrent UTIs  stable since colpopexy      COVID-19 vaccine series completed  moderna 4/2/21      Unilateral primary osteoarthritis, left knee      Restless leg      Ascending aorta dilation      S/P hysterectomy  2004 after prolapse      Normal vaginal delivery  x 3      History of total left hip replacement  2014      Pelvic prolapse  laparoscopic colpopexy 2019      S/P hip replacement, right          FAMILY HISTORY:  FH: HTN (hypertension) (Father, Mother)        SOCIAL HISTORY:     Allergies    sulfa drugs (Unknown)  Sulfur (Rash)  clindamycin (Rash)  strawberry (Rash)    Intolerances    ACE inhibitors (Other)      Home Medications:  Aleve 220 mg oral capsule: 1 tab(s) orally prn (10 Jul 2023 10:15)  gabapentin 600 mg oral tablet: 1 tab(s) orally 3 times a day (10 Jul 2023 10:15)  levothyroxine 112 mcg (0.112 mg) oral tablet: 1 tab(s) orally 5 times a week (10 Jul 2023 10:15)  levothyroxine 125 mcg (0.125 mg) oral tablet: 1 tab(s) orally 2 times a week (10 Jul 2023 10:15)  metoprolol succinate 50 mg oral tablet, extended release: 1 tab(s) orally once a day (10 Jul 2023 17:27)  Tylenol 500mg 1 tab prn:  (10 Jul 2023 10:15)      MEDICATIONS  (STANDING):  aspirin enteric coated 325 milliGRAM(s) Oral every 12 hours  HYDROmorphone  Injectable 0.5 milliGRAM(s) IV Push once  ketorolac   Injectable 15 milliGRAM(s) IV Push every 6 hours  lactated ringers. 1000 milliLiter(s) (30 mL/Hr) IV Continuous <Continuous>  pantoprazole    Tablet 40 milliGRAM(s) Oral before breakfast  polyethylene glycol 3350 17 Gram(s) Oral at bedtime  senna 2 Tablet(s) Oral at bedtime  sodium chloride 0.9% lock flush 3 milliLiter(s) IV Push every 8 hours    MEDICATIONS  (PRN):  magnesium hydroxide Suspension 30 milliLiter(s) Oral daily PRN Constipation  ondansetron Injectable 4 milliGRAM(s) IV Push every 6 hours PRN Nausea and/or Vomiting  oxyCODONE    IR 10 milliGRAM(s) Oral every 4 hours PRN Severe Pain (7 - 10)  oxyCODONE    IR 5 milliGRAM(s) Oral every 4 hours PRN Moderate Pain (4 - 6)  traMADol 50 milliGRAM(s) Oral every 6 hours PRN Mild Pain (1 - 3)      PHYSICAL EXAM:  Vital Signs Last 24 Hrs  T(C): 36.9 (11 Jul 2023 09:24), Max: 36.9 (11 Jul 2023 01:37)  T(F): 98.4 (11 Jul 2023 09:24), Max: 98.5 (11 Jul 2023 01:37)  HR: 59 (11 Jul 2023 09:24) (48 - 69)  BP: 113/74 (11 Jul 2023 09:24) (113/66 - 169/91)  BP(mean): 94 (10 Jul 2023 21:30) (76 - 111)  RR: 18 (11 Jul 2023 09:24) (10 - 20)  SpO2: 97% (11 Jul 2023 09:24) (95% - 100%)    Parameters below as of 11 Jul 2023 09:24  Patient On (Oxygen Delivery Method): room air        CONSTITUTIONAL: well-developed, well-groomed, no apparent distress  EYES: no conjunctival or scleral injection, non-icteric, PERRLA  ENMT: no external nasal lesions, oral mucosa with moist membranes  NECK: trachea midline, no palpable neck mass   RESPIRATORY: breathing comfortably; lungs CTA without wheeze/rales/rhonchi  CARDIOVASCULAR: regular rate and rhythm; +S1S2, no murmurs, rubs, or gallops, no lower extremity edema, 2+ peripheral pulses  GASTROINTESTINAL: soft, nontender, nondistended; +BS throughout, no rebound/guarding  MUSCULOSKELETAL: no joint effusions, normal strength and tone of extremities   NEUROLOGIC: non-focal, sensation intact to light touch in b/l upper and lower extremities   PSYCHIATRIC: AAOx3, appropriate mood and affect  SKIN: no rashes or lesions, warm     LABS:                        11.7   8.70  )-----------( 221      ( 11 Jul 2023 05:00 )             36.1     07-11    136  |  99  |  12  ----------------------------<  121<H>  4.0   |  24  |  0.69    Ca    9.4      11 Jul 2023 05:00    TPro  6.1  /  Alb  4.2  /  TBili  0.2  /  DBili  <0.2  /  AST  23  /  ALT  37<H>  /  AlkPhos  64  07-11          Urinalysis Basic - ( 11 Jul 2023 05:00 )    Color: x / Appearance: x / SG: x / pH: x  Gluc: 121 mg/dL / Ketone: x  / Bili: x / Urobili: x   Blood: x / Protein: x / Nitrite: x   Leuk Esterase: x / RBC: x / WBC x   Sq Epi: x / Non Sq Epi: x / Bacteria: x        RADIOLOGY & ADDITIONAL TESTS:    EKG Personally Reviewed:    Imaging Personally Reviewed:    Care Discussed with Consultants/Other Providers:

## 2023-07-11 NOTE — OCCUPATIONAL THERAPY INITIAL EVALUATION ADULT - MD ORDER
Occupational Therapy to evaluate and treat. Per RN pt is okay to participate in OT eval and perform activity as tolerated.

## 2023-07-11 NOTE — PROGRESS NOTE ADULT - ATTENDING COMMENTS
Patient seen and examined. Catia Last is a 63 year old female now status post Left Total Knee Arthroplasty. Patient did have some pain overnight but is doing well overall. She walked this morning. No acute events overnight.    Physical Exam:  Dressing: Clean, Dry, Intact  Motor: Intact EHL/FHL/Tibialis Anterior/Gastrocnemius  Sensory: Intact Superficial Peroneal/Deep Peroneal/Saphenous/Sural/Tibial Nerves  Vascular: 2+ DP Pulse    Assessment/Plan:  Catia Last is a 63 year old female now status post Left Total Knee Arthroplasty    Plan:  -Left Lower Extremity: Weight Bearing as Tolerated  -PT/OT, Out of Bed  -Pain Control: Per Protocol  -DVT Prophylaxis: Aspirin 325mg twice per day  -Antibiotics: Ancef 2g x 24 hours  -No Tylenol, Statins or Celebrex  -Disposition: Pending

## 2023-07-11 NOTE — PROGRESS NOTE ADULT - ASSESSMENT
A/P: 63y y/o Female s/p L total knee arthroplasty, POD #1  - Wedge pillow for elevation   - Pain control  - Antibiotic - Ancef postop  - Incentive Spirometry  - DVT prophylaxis: Venodynes/Aspirin 325mg BID  - F/U AM Labs  - PT/OT/WBAT  - Notify Orthopedics with any questions
A/P: 63y y/o Female s/p L total knee arthroplasty, POD #0  - Wedge pillow for elevation   - Pain control  - Antibiotic - Ancef postop  - Incentive Spirometry  - DVT prophylaxis: Venodynes/Aspirin 325mg BID  - F/U AM Labs  - PT/OT/WBAT  - Notify Orthopedics with any questions

## 2023-07-11 NOTE — DISCHARGE NOTE NURSING/CASE MANAGEMENT/SOCIAL WORK - NSDCPNINST_GEN_ALL_CORE
You have a postop appointment with Dr Graham on 7/25/2023 @ 11:00am [address] [place] [address], please keep postop dressing on.  Notify Dr Graham if you experience any increaase in pain not relieved with medicaiton, any redness, drainage or swelling around incision or any fever >100.5.  Drink plenty of fluids, no heavy lifting or straining. Continue to elevate your leg, do exercises and apply cold therapy as instructed.  Use over the counter stool softeners to assist with constipation which can be a side effect of narcotic pain medication.

## 2023-07-11 NOTE — PHYSICAL THERAPY INITIAL EVALUATION ADULT - GENERAL OBSERVATIONS, REHAB EVAL
Pt received semisupine in bed. No acute distress. Pt agreeable to OT evaluation. Vitals; /91 mmHg. Pt received semisupine in bed. No acute distress. Left knee bandage clean dry and intact, with cold compression. Pt agreeable to PT evaluation. Vitals; /91 mmHg.

## 2023-07-11 NOTE — CONSULT NOTE ADULT - PROBLEM SELECTOR RECOMMENDATION 9
s/p L. TKR (OR 7/10)  - post op care per ortho  - pain control + bowel regimen   - encouraged incentive spirometer use  - PT evaluation   - DVT ppx: asa BID

## 2023-07-11 NOTE — PROGRESS NOTE ADULT - SUBJECTIVE AND OBJECTIVE BOX
Orthopaedic Surgery Progress Note    Subjective:   Patient seen and examined. No acute events overnight. States pain is controlled. Denies fever/chills/chest pain/shortness of breath/numbness/tingling.    Objective:  T(C): 36.9 (07-11-23 @ 01:37), Max: 36.9 (07-11-23 @ 01:37)  HR: 60 (07-11-23 @ 01:37) (48 - 69)  BP: 147/90 (07-11-23 @ 01:37) (113/66 - 169/91)  RR: 17 (07-11-23 @ 01:37) (10 - 20)  SpO2: 100% (07-11-23 @ 01:37) (95% - 100%)  Wt(kg): --    07-10 @ 07:01  -  07-11 @ 06:27  --------------------------------------------------------  IN: 500 mL / OUT: 400 mL / NET: 100 mL        Physical Exam:    Gen: awake, alert, NAD  Resp: no increased work of breathing  LLE:  Aquacel dressing c/d/i   +EHL/FHL/TA/GS  SILT S/S/SP/DP  +DP/PT Pulses  Compartments soft  No calf TTP                           11.7   8.70  )-----------( 221      ( 11 Jul 2023 05:00 )             36.1     07-11    136  |  99  |  12  ----------------------------<  121<H>  4.0   |  24  |  0.69    Ca    9.4      11 Jul 2023 05:00    TPro  6.1  /  Alb  4.2  /  TBili  0.2  /  DBili  <0.2  /  AST  23  /  ALT  37<H>  /  AlkPhos  64  07-11      Urinalysis Basic - ( 11 Jul 2023 05:00 )    Color: x / Appearance: x / SG: x / pH: x  Gluc: 121 mg/dL / Ketone: x  / Bili: x / Urobili: x   Blood: x / Protein: x / Nitrite: x   Leuk Esterase: x / RBC: x / WBC x   Sq Epi: x / Non Sq Epi: x / Bacteria: x

## 2023-07-11 NOTE — CONSULT NOTE ADULT - PROBLEM SELECTOR RECOMMENDATION 2
ALT 37  - reviewed outpatient labs, ALT was in 150s range in June iso of Tylenol use  - LFTs now downtrending, would hold hepatotoxic agents  - outpatient follow up with PCP for surveillance labs

## 2023-07-11 NOTE — CONSULT NOTE ADULT - PROBLEM SELECTOR RECOMMENDATION 4
chronic  - stopped taking rosuvastatin due to elevated LFTs outpatient  - outpatient followup with radiologist

## 2023-07-11 NOTE — DISCHARGE NOTE NURSING/CASE MANAGEMENT/SOCIAL WORK - NSDCPEFALRISK_GEN_ALL_CORE
For information on Fall & Injury Prevention, visit: [email or web address] OR  [email or web address] OR  https://www.cdc.gov/steadi/patient.html

## 2023-07-11 NOTE — PHYSICAL THERAPY INITIAL EVALUATION ADULT - ACTIVE RANGE OF MOTION EXAMINATION, REHAB EVAL
left knee mobility limited at this time s/p knee replacement/bilateral upper extremity Active ROM was WFL (within functional limits)/Right LE Active ROM was WFL (within functional limits)

## 2023-07-11 NOTE — PHYSICAL THERAPY INITIAL EVALUATION ADULT - MANUAL MUSCLE TESTING RESULTS, REHAB EVAL
patients bilateral upper extremity strength 4/5, bilateral lower extremity strength 3+/5 upon functional assessment

## 2023-07-11 NOTE — PHYSICAL THERAPY INITIAL EVALUATION ADULT - ADDITIONAL COMMENTS
As per patient she lives in a private home with her , reports one step to enter the home with 16 steps to get to the second floor where the patients bedroom is. Patient denies using any assistive device prior to admission. Patient owns a rolling walker and a cane from previous surgeries that are in good working condition. Patient was fully independent prior to admission.    Patient was left semi-reclined in bed NAD, all lines and tubes intact, L knee with bandage and cold compression, PCA at bedside to assist with bathing, call sandoval within reach, HARSH Gayle made aware.

## 2023-07-11 NOTE — PHYSICAL THERAPY INITIAL EVALUATION ADULT - NS ASR RISK AREAS PT EVAL
Speech Therapy Daily Treatment  Infant Feeding/Swallow     Admitting diagnosis:   infant of 28 completed weeks of gestation [P07.31]   YOB: 2022, 2 month old   Corrected gestational age:38w 3d  Birth Weight: 3 lb 6.5 oz (1545 g)  Current hospitalization required due to the following medical needs:  Active Problems:      infant of 28 completed weeks of gestation    Twin delivery by     Respiratory distress of     At risk for apnea    Anemia    Hypernatremia    Thrombocytopenia (CMS/HCC)    RDS of     Hypotension due to hypovolemia    Elevated serum creatinine    Metabolic acidosis    Inguinal hernia    Moderate BPD (bronchopulmonary dysplasia)    Hypertension  Resolved Problems:    Observation and evaluation of  for suspected infectious condition ruled out    Acute respiratory failure (CMS/HCC)    Cyclical neutropenia (CMS/HCC)    Hyperbilirubinemia    Idiopathic hypotension    Medical changes or updates since last session: infant now on .1L nasal cannula    SUBJECTIVE   Collaboration with: Nurse Karen whom reports infant took 42 mls one time last night.     Pain before session:  FLACC (face, legs, activity, cry, consolability):   Face 0 No particular expression or smile   Legs 0 Normal position or relaxed   Activity 0 Lying quietly, normal position, moves easily   Cry 0 No cry, quiet   Consolability 0 Content, relaxed   Score 0       OBJECTIVE   Infant awake and alert. Infant receiving breathing treatment. Increased RR following treatment. No parent at bedside.    Status at onset of session:   Physiologic: Stable autonomic behaviors including  stable heart rate, regular respirations, pink/stable color and appropriate oxygen saturations.  Motor: Instability of the motor system as evidenced by  diffuse activity and finger splay/hault hands  State: Infant was in a active alert  state. Stable state behaviors including self consoling and  appropriate responses.    Current Feeding: PO, NG, 65 mls q 3 hours, Neosure formula, 24 abdulaziz/oz   Respiratory Status: nasal cannula, 0.1 L     Treatment/Intervention    Oral Motor/ Peripheral Examination  Structural observations: intact  Oral musculature: decreased strength of movement  Reflexes: age appropriate  Secretion management: within functional limits  Phonation: N/A  Non-nutritive suck: Rooting: disorganized, minimal mouth opening, Suck initiation: independent, Tongue movements: impaired tongue cup, impaired seal, Pattern: rhythmical  Stress cues observed during oral motor:   - Motor: none noted   - Physiological: none noted  Oral motor treatment: Infant benefited from tight swaddle and initiation of non-nutritive suck prior to start of feeding. RR 40-60 in side lying once calm and organized.    Oral Feeding:   - Infant fed by parent  - Postioning during feeding: Sidelying, Elevated   - Infant consumed 12 mls  via Dr. Brown's Ultra Preemie Nipple.     Oral Phase:   - Initiation: able to initiate a suck without stimulation required   - Skills: impaired tongue cup, impaired seal on the nipple, anterior spillage, arrhythmical, inconsistent jaw excursions, changes in vitals tachypnea, increased work of breathing, extended rest breaks needed    - Pattern: uncoordinated suck/swallow/breath pattern, disorganized   Pharyngeal Phase: Uncoordinated suck/swallow/breathe coordination    Stress Cues noted during oral feeding:  Autonomic: tachypnea; RR increased to 70-80's and bottle needed to be removed for longer rest break; as feeding continued had more difficulty recovering so feeding stopped.  State: none  Motor: finger splay/hault hands, facial grimace, squirming and anterior spillage    Therapy Techniques: Infant fed in side lying position.  Infant initially required external pacing at 3-4 sucks with nipple tipped and removed from mouth. With pacing, infant demonstrated decreased tachypnea and stress cues. After  first 5 mls, infant began bearing down and spitting liquids from mouth. Unswaddled and provided stomach massage. Infant passed gas and returned to feeding to take an additional 10 mls with pacing at 3-4 sucks.     Family Centered Support/Education: Caregiver(s) not present. Will meet and provide teaching strategies as available. Discussed with bedside nurse     ASSESSMENT:   Infant demonstrated moderately disorganized oral feeding pattern characterized by uncoordinated suck/swallow/breathe pattern. Infant required pacing and bottle removed for extended rest breaks this feeding; tachypneic and feeding stopped due to decreased ability to recover from it. Recommend feeding if awake, alert, and RR <60.  Speech to continue to follow.     Impressions & Recommendations:  Aspiration Risk Minimal  Factors include: prematurity     Tips for Caregivers  Positioning Recommendations: Elevated Side Lying  Nipple Recommendations: Dr. Issa's ultra-preemie  Pacing Recommendations: Feeder required to closely follow infant driven cues  State Regulation: swaddle  Stress Signals: finger splay, tachypnea  Recommendation Comments: Stop feeding with signs of fatigue or poor tolerance    Patient will benefit from speech therapy. Habilitative potential is good based on assessment above    Pain after session:  FLACC (face, legs, activity, cry, consolability):   Face 0 No particular expression or smile   Legs 0 Normal position or relaxed   Activity 0 Lying quietly, normal position, moves easily   Cry 0 No cry, quiet   Consolability 0 Content, relaxed   Score 0       PLAN:   Suggestions for next session as indicated: Continue with plan of care    Goals:  Goals  Discharge Goal #1: Infant will initiate and maintain a safe, organized po feeding pattern to sustain nutrition and hydration  Goal Progression #1: Outcome not met, continue to monitor    Plan  Frequency: 2-3x/week  Plan for Next Session: address dysphagia    Documentation completed on  following flowsheet: SLP flowsheet, Infant flowsheet   fall

## 2023-07-11 NOTE — PHYSICAL THERAPY INITIAL EVALUATION ADULT - NSPTDISCHREC_GEN_A_CORE
Patient has safely demonstrated the ability to perform ambulation, stairs and car transfers therefore is functionally cleared from Physical Therapy perspective.  Will continue to follow while here in the hospital until discharge.  anticipated discharge to home with home PT services to address current functional limitations to optimize safety within the home environment with the use of a rolling walker./Home PT

## 2023-07-11 NOTE — CONSULT NOTE ADULT - PROBLEM SELECTOR RECOMMENDATION 5
chronic stable  - can resume home levothyroxine 125mcg Wednesday + Saturday, 115mcg on remainder 5 days

## 2023-07-11 NOTE — DISCHARGE NOTE NURSING/CASE MANAGEMENT/SOCIAL WORK - PATIENT PORTAL LINK FT
You can access the FollowMyHealth Patient Portal offered by [facility] by registering at the following website: [email or web address]. By joining [facility]’s FollowMyHealth portal, you will also be able to view your health information using other applications (apps) compatible with our system.

## 2023-07-24 ENCOUNTER — NON-APPOINTMENT (OUTPATIENT)
Age: 63
End: 2023-07-24

## 2023-07-25 ENCOUNTER — APPOINTMENT (OUTPATIENT)
Dept: ORTHOPEDIC SURGERY | Facility: CLINIC | Age: 63
End: 2023-07-25
Payer: COMMERCIAL

## 2023-07-25 PROCEDURE — 99024 POSTOP FOLLOW-UP VISIT: CPT

## 2023-07-25 PROCEDURE — 73562 X-RAY EXAM OF KNEE 3: CPT | Mod: LT

## 2023-07-28 ENCOUNTER — NON-APPOINTMENT (OUTPATIENT)
Age: 63
End: 2023-07-28

## 2023-07-28 RX ORDER — DIPHENHYDRAMINE HCL 50 MG
1 CAPSULE ORAL
Qty: 5 | Refills: 0
Start: 2023-07-28 | End: 2023-08-01

## 2023-08-04 ENCOUNTER — APPOINTMENT (OUTPATIENT)
Dept: CARDIOLOGY | Facility: CLINIC | Age: 63
End: 2023-08-04

## 2023-08-05 NOTE — PHYSICAL EXAM
[de-identified] : Constitutional: 63 year old female, alert and oriented, cooperative, in no acute distress.  HEENT  NC/AT.  Appearance: symmetric  Neck/Back Straight without deformity or instability.  Good ROM.  Chest/Respiratory  Respiratory effort: no intercostal retractions or use of accessory muscles. Nonlabored Breathing  Skin  On inspection, warm and dry without rashes or lesions.  Mental Status:  Judgment, insight: intact Orientation: oriented to time, place, and person  Neurological: Sensory and Motor are grossly intact throughout  Left Knee  Inspection:     Incision well healing. No erythema or drainage     No Effusion     Non-tender to palpation over tibial tubercle, patella, medial and lateral joint line, and pes insertion.  Range of Motion: 	Extension - 0 degrees 	Flexion - 120 degrees 	Alignment - Valgus 3 degrees 	Extensor lag: None  Stability:      Demonstrates no Varus or Valgus instability      Negative Anterior or Posterior drawer.      No mid flexion instability  Patella: stable, tracks well.   Neurologic Exam     Motor intact including 5/5 Extensor Hallucis Longus, 5/5 Flexor Hallucis Longus, 5/5 Tibialis Anterior and 5/5 Gastrocnemius     Sensation Intact to Light Touch including Saphenous, Sural, Superficial Peroneal, Deep Peroneal, Tibial nerve distributions  Vascular Exam     Foot is warm and well perfused with 2+ Dorsalis Pedis Pulse   No pain with range of motion of the bilateral hips or right knee. No lumbar paraspinal muscle tenderness.  [de-identified] : XRay:  XRays of the Left Knee (3 Views) taken in the office today and reviewed with the patient. XRays demonstrate a Left Total Knee Arthroplasty in good position and alignment. There is no obvious evidence of fracture, dislocation, osteolysis or loosening. (my personal interpretation)\par Components: Trang Triathlon PS Cemented

## 2023-08-05 NOTE — HISTORY OF PRESENT ILLNESS
[de-identified] : 7/25/2023  Catia Last presents to the office for follow-up of her left total knee arthroplasty.  Patient underwent left TKA on 7/10/2023.  She still has some knee pain.  Patient is currently taking tramadol and Tylenol, with occasional oxycodone.  She is currently in physical therapy and is doing well.  No falls or injuries.  No fevers or chills.  Patient states that her range of motion was measured at 120 degrees of flexion.  6/2/2023  Catia Last presents to the office for follow-up of her left knee pain.  Patient continues to have significant left knee pain that is keeping her up at night.  Pain is located to the anterior knee, but is mostly medial at this time.  She does have some posterior knee pain.  She has not noted any relieving factors at this time.  Patient has tried ice.  She has tried physical therapy, injections, and anti-inflammatories, without significant relief.  Patient is unable to take Celebrex because of a sulfa allergy.  She states that the pain is now affecting her quality of life.  Prior knee injection helped for about 1 week.  Patient underwent left knee MRI, which showed osteoarthritis.  5/5/2023 Ms. Last is a 62-year-old female presents the office for evaluation of her left knee pain.  In August 2022, patient jumped out of her bed and felt a pop in the knee, causing significant pain.  This pain then resolved.  Her pain returned in November 2022 and she has experienced consistently worsening pain.  Patient has a history of bilateral THAs by Dr. Andres and she was evaluated by Dr. Andres for her knee pain.  Patient was still not she may have a meniscus tear.  She tried physical therapy for about 2.5 months, without significant relief.  Patient underwent corticosteroid injection by Dr. Leal on 3/10/2023, which did not provide significant relief.  She has tried Tylenol and Aleve, with some relief.  Patient has a history of a sulfa allergy and is unable to take Celebrex.  She continues to have significant anterior knee pain and difficulty with standing.  There is pain with activity.  Pain is improved with sitting, rest, and ice.  She states that her pain is now affecting her quality of life.  History: HTN, Migraines, Hypothyroidism, Dilated Aortic Root, HLD, Allergy: Sulfa

## 2023-08-05 NOTE — DISCUSSION/SUMMARY
[de-identified] : Catai Last is a 63-year-old female presents the office for follow-up of her left total knee arthroplasty.  Patient underwent left TKA on 7/10/2023.  She is currently doing well overall.  She does still have some knee pain.  X-rays showed left total knee arthroplasty in good position and alignment.  Examination showed range of motion 0 to 120 degrees.  Discussed with patient the examination and imaging findings.  Discussed with the patient the recovery from total knee arthroplasty, including physical therapy, pain control, and DVT prophylaxis.  Patient was given referral to outpatient physical therapy.  She will continue her pain medications for her pain control.  Patient will continue her Aspirin 325 mg twice per day for total of 6 weeks for her DVT prophylaxis.  Dressing removed.  Patient may shower, but should not soak the wound.  Patient will follow-up in 4 weeks for reevaluation and management.  Patient understanding and in agreement the plan.  All questions answered.  Plan: -Physical therapy -DVT prophylaxis: Aspirin.  325 mg twice per day for a total of 6 weeks -Pain control -Patient may shower, but should not soak the wound -Follow-up in 4 weeks for reevaluation and management

## 2023-08-05 NOTE — REVIEW OF SYSTEMS
[Negative] : Endocrine [Joint Pain] : no joint pain [Joint Stiffness] : no joint stiffness [Joint Swelling] : no joint swelling [Fever] : no fever [Chills] : no chills

## 2023-08-10 ENCOUNTER — RX RENEWAL (OUTPATIENT)
Age: 63
End: 2023-08-10

## 2023-08-26 ENCOUNTER — NON-APPOINTMENT (OUTPATIENT)
Age: 63
End: 2023-08-26

## 2023-08-29 ENCOUNTER — APPOINTMENT (OUTPATIENT)
Dept: ORTHOPEDIC SURGERY | Facility: CLINIC | Age: 63
End: 2023-08-29
Payer: COMMERCIAL

## 2023-08-29 PROCEDURE — 73562 X-RAY EXAM OF KNEE 3: CPT | Mod: LT

## 2023-08-29 PROCEDURE — 99024 POSTOP FOLLOW-UP VISIT: CPT

## 2023-09-03 NOTE — HISTORY OF PRESENT ILLNESS
[de-identified] : 8/29/2023  Catia Last presents to the office for follow-up of her left total knee arthroplasty.  Patient underwent left TKA on 7/10/2023.  She is overall doing well.  Her pain is well controlled at this time and she has no pain with ambulation.  Patient has some stiffness and pain when standing still and at night.  She is not taking any pain medications.  Patient continues to work well in physical therapy and has 125 degrees flexion.  She has some occasional left hip pain.  No fevers or chills.  No falls or injuries.  7/25/2023  Catia Last presents to the office for follow-up of her left total knee arthroplasty.  Patient underwent left TKA on 7/10/2023.  She still has some knee pain.  Patient is currently taking tramadol and Tylenol, with occasional oxycodone.  She is currently in physical therapy and is doing well.  No falls or injuries.  No fevers or chills.  Patient states that her range of motion was measured at 120 degrees of flexion.  6/2/2023  Catia Last presents to the office for follow-up of her left knee pain.  Patient continues to have significant left knee pain that is keeping her up at night.  Pain is located to the anterior knee, but is mostly medial at this time.  She does have some posterior knee pain.  She has not noted any relieving factors at this time.  Patient has tried ice.  She has tried physical therapy, injections, and anti-inflammatories, without significant relief.  Patient is unable to take Celebrex because of a sulfa allergy.  She states that the pain is now affecting her quality of life.  Prior knee injection helped for about 1 week.  Patient underwent left knee MRI, which showed osteoarthritis.  5/5/2023 Ms. Last is a 62-year-old female presents the office for evaluation of her left knee pain.  In August 2022, patient jumped out of her bed and felt a pop in the knee, causing significant pain.  This pain then resolved.  Her pain returned in November 2022 and she has experienced consistently worsening pain.  Patient has a history of bilateral THAs by Dr. Andres and she was evaluated by Dr. Andres for her knee pain.  Patient was still not she may have a meniscus tear.  She tried physical therapy for about 2.5 months, without significant relief.  Patient underwent corticosteroid injection by Dr. Leal on 3/10/2023, which did not provide significant relief.  She has tried Tylenol and Aleve, with some relief.  Patient has a history of a sulfa allergy and is unable to take Celebrex.  She continues to have significant anterior knee pain and difficulty with standing.  There is pain with activity.  Pain is improved with sitting, rest, and ice.  She states that her pain is now affecting her quality of life.  History: HTN, Migraines, Hypothyroidism, Dilated Aortic Root, HLD, Allergy: Sulfa

## 2023-09-03 NOTE — PHYSICAL EXAM
[de-identified] : Constitutional: 63 year old female, alert and oriented, cooperative, in no acute distress.  HEENT  NC/AT.  Appearance: symmetric  Neck/Back Straight without deformity or instability.  Good ROM.  Chest/Respiratory  Respiratory effort: no intercostal retractions or use of accessory muscles. Nonlabored Breathing  Skin  On inspection, warm and dry without rashes or lesions.  Mental Status:  Judgment, insight: intact Orientation: oriented to time, place, and person  Neurological: Sensory and Motor are grossly intact throughout  Left Knee  Inspection:     Incision well healing. No erythema or drainage     No Effusion     Non-tender to palpation over tibial tubercle, patella, medial and lateral joint line, and pes insertion.  Range of Motion: 	Extension - 0 degrees 	Flexion - 125 degrees 	Alignment - Valgus 3 degrees 	Extensor lag: None  Stability:      Demonstrates no Varus or Valgus instability      Negative Anterior or Posterior drawer.      No mid flexion instability  Patella: stable, tracks well.   Neurologic Exam     Motor intact including 5/5 Extensor Hallucis Longus, 5/5 Flexor Hallucis Longus, 5/5 Tibialis Anterior and 5/5 Gastrocnemius     Sensation Intact to Light Touch including Saphenous, Sural, Superficial Peroneal, Deep Peroneal, Tibial nerve distributions  Vascular Exam     Foot is warm and well perfused with 2+ Dorsalis Pedis Pulse   No pain with range of motion of the bilateral hips or right knee. No lumbar paraspinal muscle tenderness.  [de-identified] : XRay:  XRays of the Left Knee (3 Views) taken in the office today and reviewed with the patient. XRays demonstrate a Left Total Knee Arthroplasty in good position and alignment. There is no obvious evidence of fracture, dislocation, osteolysis or loosening. (my personal interpretation)\par  Components: Trang Triathlon PS Cemented

## 2023-09-03 NOTE — DISCUSSION/SUMMARY
[de-identified] : Catia Last is a 63-year-old female presents the office for follow-up of her left total knee arthroplasty.  Patient underwent left TKA on 7/10/2023.  She is currently doing well overall.  X-rays showed left total knee arthroplasty in good position and alignment.  Examination showed range of motion 0 to 125 degrees.  Discussed with patient the examination and imaging findings.  Discussed with the patient the recovery from total knee arthroplasty, including physical therapy and pain control. Patient will continue her physical therapy.  Patient has completed her DVT prophylaxis.  She will take over-the-counter anti-inflammatories as needed for her pain control.  Patient will follow-up in 6 weeks for reevaluation and management.  Patient understanding and in agreement the plan.  All questions answered.  Plan: -Physical therapy -Over-the-counter anti-inflammatories as needed for pain control -DVT prophylaxis: Completed -Follow-up in 6 weeks for reevaluation and management

## 2023-09-16 ENCOUNTER — RX RENEWAL (OUTPATIENT)
Age: 63
End: 2023-09-16

## 2023-09-18 ENCOUNTER — APPOINTMENT (OUTPATIENT)
Dept: ORTHOPEDIC SURGERY | Facility: CLINIC | Age: 63
End: 2023-09-18
Payer: COMMERCIAL

## 2023-09-18 VITALS — HEIGHT: 69 IN | BODY MASS INDEX: 28.88 KG/M2 | WEIGHT: 195 LBS

## 2023-09-18 DIAGNOSIS — Z98.890 OTHER SPECIFIED POSTPROCEDURAL STATES: ICD-10-CM

## 2023-09-18 DIAGNOSIS — Z96.642 PRESENCE OF LEFT ARTIFICIAL HIP JOINT: ICD-10-CM

## 2023-09-18 DIAGNOSIS — Z96.641 PRESENCE OF RIGHT ARTIFICIAL HIP JOINT: ICD-10-CM

## 2023-09-18 PROCEDURE — 73502 X-RAY EXAM HIP UNI 2-3 VIEWS: CPT

## 2023-09-18 PROCEDURE — 99213 OFFICE O/P EST LOW 20 MIN: CPT | Mod: 24

## 2023-09-29 ENCOUNTER — RX RENEWAL (OUTPATIENT)
Age: 63
End: 2023-09-29

## 2023-09-29 DIAGNOSIS — E55.9 VITAMIN D DEFICIENCY, UNSPECIFIED: ICD-10-CM

## 2023-10-02 ENCOUNTER — RX RENEWAL (OUTPATIENT)
Age: 63
End: 2023-10-02

## 2023-10-12 ENCOUNTER — APPOINTMENT (OUTPATIENT)
Dept: ORTHOPEDIC SURGERY | Facility: CLINIC | Age: 63
End: 2023-10-12
Payer: COMMERCIAL

## 2023-10-12 PROCEDURE — 73562 X-RAY EXAM OF KNEE 3: CPT | Mod: LT

## 2023-10-12 PROCEDURE — 99212 OFFICE O/P EST SF 10 MIN: CPT

## 2023-10-27 ENCOUNTER — APPOINTMENT (OUTPATIENT)
Dept: FAMILY MEDICINE | Facility: CLINIC | Age: 63
End: 2023-10-27
Payer: COMMERCIAL

## 2023-10-27 VITALS
SYSTOLIC BLOOD PRESSURE: 121 MMHG | OXYGEN SATURATION: 97 % | RESPIRATION RATE: 15 BRPM | TEMPERATURE: 98.6 F | HEART RATE: 58 BPM | DIASTOLIC BLOOD PRESSURE: 79 MMHG

## 2023-10-27 DIAGNOSIS — Z13.220 ENCOUNTER FOR SCREENING FOR LIPOID DISORDERS: ICD-10-CM

## 2023-10-27 DIAGNOSIS — E78.00 PURE HYPERCHOLESTEROLEMIA, UNSPECIFIED: ICD-10-CM

## 2023-10-27 DIAGNOSIS — E66.3 OVERWEIGHT: ICD-10-CM

## 2023-10-27 LAB
25(OH)D3 SERPL-MCNC: 32.8 NG/ML
ALBUMIN SERPL ELPH-MCNC: 4.6 G/DL
ALP BLD-CCNC: 81 U/L
ALT SERPL-CCNC: 26 U/L
ANION GAP SERPL CALC-SCNC: 12 MMOL/L
AST SERPL-CCNC: 20 U/L
BASOPHILS # BLD AUTO: 0.04 K/UL
BASOPHILS NFR BLD AUTO: 0.8 %
BILIRUB SERPL-MCNC: 0.2 MG/DL
BUN SERPL-MCNC: 21 MG/DL
CALCIUM SERPL-MCNC: 9.8 MG/DL
CHLORIDE SERPL-SCNC: 100 MMOL/L
CHOLEST SERPL-MCNC: 287 MG/DL
CO2 SERPL-SCNC: 26 MMOL/L
CREAT SERPL-MCNC: 0.89 MG/DL
EGFR: 72 ML/MIN/1.73M2
EOSINOPHIL # BLD AUTO: 0.17 K/UL
EOSINOPHIL NFR BLD AUTO: 3.4 %
GLUCOSE SERPL-MCNC: 87 MG/DL
HCT VFR BLD CALC: 40.2 %
HDLC SERPL-MCNC: 81 MG/DL
HGB BLD-MCNC: 12.8 G/DL
IMM GRANULOCYTES NFR BLD AUTO: 0.2 %
LDLC SERPL CALC-MCNC: 188 MG/DL
LYMPHOCYTES # BLD AUTO: 1.94 K/UL
LYMPHOCYTES NFR BLD AUTO: 38.6 %
MAN DIFF?: NORMAL
MCHC RBC-ENTMCNC: 29.6 PG
MCHC RBC-ENTMCNC: 31.8 GM/DL
MCV RBC AUTO: 93.1 FL
MONOCYTES # BLD AUTO: 0.46 K/UL
MONOCYTES NFR BLD AUTO: 9.2 %
NEUTROPHILS # BLD AUTO: 2.4 K/UL
NEUTROPHILS NFR BLD AUTO: 47.8 %
NONHDLC SERPL-MCNC: 206 MG/DL
PLATELET # BLD AUTO: 246 K/UL
POTASSIUM SERPL-SCNC: 4.5 MMOL/L
PROT SERPL-MCNC: 6.8 G/DL
RBC # BLD: 4.32 M/UL
RBC # FLD: 13.9 %
SODIUM SERPL-SCNC: 138 MMOL/L
TRIGL SERPL-MCNC: 105 MG/DL
TSH SERPL-ACNC: 2.6 UIU/ML
WBC # FLD AUTO: 5.02 K/UL

## 2023-10-27 PROCEDURE — 99214 OFFICE O/P EST MOD 30 MIN: CPT

## 2023-10-27 RX ORDER — ROSUVASTATIN CALCIUM 10 MG/1
10 TABLET, FILM COATED ORAL
Qty: 90 | Refills: 0 | Status: DISCONTINUED | COMMUNITY
Start: 2022-02-17 | End: 2023-10-27

## 2023-10-27 NOTE — ASU PATIENT PROFILE, ADULT - SURGERY TIME
Subjective   Patient ID: Antonella Montana is a 56 y.o. female.  56-year-old female referred by Dr. Monterroso for an infected sebaceous cyst of the left medial breast.  Patient has been prescribed Augmentin.  Patient is now symptomatically better  Abscess  Pertinent negatives include no chills or fever.       Review of Systems   Constitutional:  Negative for chills and fever.   Skin:         Cystic mass  of Left Breast        Objective   Physical Exam  Well-developed patient in no acute distress  Examination HEENT within normal limits  Neck supple no observable masses  Lungs clear to auscultation  Heart regular rate and rhythm  Examination of the left medial breast reveals a 3 x 3 cm indurated area that is tender but patient states is much improved there is minimal erythema.  Abdomen soft nontender  Extremities full range of motion  Neurological exam is grossly within normal limits  Assessment/Plan   There are no diagnoses linked to this encounter.  Resolving infection sebaceous or epidermal inclusion cyst left medial breast  Recommend to continue current antibiotics  Discussed the need for eventual excision once the inflammation has subsided  Follow-up 2 weeks    
10:30

## 2023-10-29 ENCOUNTER — RX RENEWAL (OUTPATIENT)
Age: 63
End: 2023-10-29

## 2023-12-08 NOTE — PHYSICAL THERAPY INITIAL EVALUATION ADULT - RANGE OF MOTION EXAMINATION, REHAB EVAL
Intact
left knee mobility limited at this time s/p knee replacement/bilateral upper extremity ROM was WFL (within functional limits)/Right LE ROM was WFL (within functional limits)

## 2023-12-10 ENCOUNTER — NON-APPOINTMENT (OUTPATIENT)
Age: 63
End: 2023-12-10

## 2023-12-13 ENCOUNTER — APPOINTMENT (OUTPATIENT)
Dept: FAMILY MEDICINE | Facility: CLINIC | Age: 63
End: 2023-12-13
Payer: COMMERCIAL

## 2023-12-13 VITALS
WEIGHT: 202 LBS | HEIGHT: 69 IN | OXYGEN SATURATION: 100 % | DIASTOLIC BLOOD PRESSURE: 85 MMHG | SYSTOLIC BLOOD PRESSURE: 154 MMHG | TEMPERATURE: 98.6 F | BODY MASS INDEX: 29.92 KG/M2 | HEART RATE: 60 BPM | RESPIRATION RATE: 15 BRPM

## 2023-12-13 DIAGNOSIS — N60.01 SOLITARY CYST OF RIGHT BREAST: ICD-10-CM

## 2023-12-13 DIAGNOSIS — Z00.00 ENCOUNTER FOR GENERAL ADULT MEDICAL EXAMINATION W/OUT ABNORMAL FINDINGS: ICD-10-CM

## 2023-12-13 DIAGNOSIS — N60.09 SOLITARY CYST OF UNSPECIFIED BREAST: ICD-10-CM

## 2023-12-13 DIAGNOSIS — N60.02 SOLITARY CYST OF RIGHT BREAST: ICD-10-CM

## 2023-12-13 LAB
ALP BLD-CCNC: 77 U/L
ALT SERPL-CCNC: 29 U/L
AST SERPL-CCNC: 27 U/L
CHOLEST SERPL-MCNC: 261 MG/DL
HDLC SERPL-MCNC: 87 MG/DL
LDLC SERPL CALC-MCNC: 154 MG/DL
NONHDLC SERPL-MCNC: 175 MG/DL
TRIGL SERPL-MCNC: 123 MG/DL

## 2023-12-13 PROCEDURE — 99396 PREV VISIT EST AGE 40-64: CPT

## 2023-12-13 NOTE — HISTORY OF PRESENT ILLNESS
[FreeTextEntry1] : Presents for CPE. Feels well. MIgrane headaches becoming more frequent, has them since age 44, but now more frequent, unsure why.

## 2023-12-13 NOTE — REVIEW OF SYSTEMS
[Headache] : headache [Fever] : no fever [Chills] : no chills [Fatigue] : no fatigue [Hot Flashes] : no hot flashes [Night Sweats] : no night sweats [Recent Change In Weight] : ~T no recent weight change [Discharge] : no discharge [Pain] : no pain [Redness] : no redness [Dryness] : no dryness  [Vision Problems] : no vision problems [Itching] : no itching [Earache] : no earache [Hearing Loss] : no hearing loss [Nosebleed] : no nosebleeds [Hoarseness] : no hoarseness [Nasal Discharge] : no nasal discharge [Sore Throat] : no sore throat [Postnasal Drip] : no postnasal drip [Chest Pain] : no chest pain [Palpitations] : no palpitations [Leg Claudication] : no leg claudication [Lower Ext Edema] : no lower extremity edema [Shortness Of Breath] : no shortness of breath [Wheezing] : no wheezing [Cough] : no cough [Dyspnea on Exertion] : no dyspnea on exertion [Abdominal Pain] : no abdominal pain [Nausea] : no nausea [Constipation] : no constipation [Diarrhea] : diarrhea [Vomiting] : no vomiting [Heartburn] : no heartburn [Melena] : no melena [Dysuria] : no dysuria [Incontinence] : no incontinence [Nocturia] : no nocturia [Hematuria] : no hematuria [Frequency] : no frequency [Vaginal Discharge] : no vaginal discharge [Joint Pain] : no joint pain [Joint Stiffness] : no joint stiffness [Joint Swelling] : no joint swelling [Muscle Weakness] : no muscle weakness [Muscle Pain] : no muscle pain [Back Pain] : no back pain [Itching] : no itching [Mole Changes] : no mole changes [Nail Changes] : no nail changes [Hair Changes] : no hair changes [Skin Rash] : no skin rash [Dizziness] : no dizziness [Fainting] : no fainting [Confusion] : no confusion [Memory Loss] : no memory loss [Suicidal] : not suicidal [Insomnia] : no insomnia [Anxiety] : no anxiety [Depression] : no depression [Easy Bleeding] : no easy bleeding [Easy Bruising] : no easy bruising [Swollen Glands] : no swollen glands [FreeTextEntry3] : ophtho: will make appointment [FreeTextEntry7] : colonoscopy: last year revisit 10 years  [FreeTextEntry8] : no pmb, hysterectomy [de-identified] : migranes since age 44, recently increased in frequency

## 2023-12-13 NOTE — PHYSICAL EXAM
[No Acute Distress] : no acute distress [Well Nourished] : well nourished [Well Developed] : well developed [Well-Appearing] : well-appearing [Normal Sclera/Conjunctiva] : normal sclera/conjunctiva [PERRL] : pupils equal round and reactive to light [EOMI] : extraocular movements intact [Normal Outer Ear/Nose] : the outer ears and nose were normal in appearance [Normal Oropharynx] : the oropharynx was normal [No JVD] : no jugular venous distention [No Lymphadenopathy] : no lymphadenopathy [Supple] : supple [Thyroid Normal, No Nodules] : the thyroid was normal and there were no nodules present [No Respiratory Distress] : no respiratory distress  [No Accessory Muscle Use] : no accessory muscle use [Clear to Auscultation] : lungs were clear to auscultation bilaterally [Normal Rate] : normal rate  [Regular Rhythm] : with a regular rhythm [Normal S1, S2] : normal S1 and S2 [Pedal Pulses Present] : the pedal pulses are present [No Edema] : there was no peripheral edema [No Extremity Clubbing/Cyanosis] : no extremity clubbing/cyanosis [Soft] : abdomen soft [Non Tender] : non-tender [Non-distended] : non-distended [No HSM] : no HSM [Normal Anterior Cervical Nodes] : no anterior cervical lymphadenopathy [No Joint Swelling] : no joint swelling [Grossly Normal Strength/Tone] : grossly normal strength/tone [No Rash] : no rash [Coordination Grossly Intact] : coordination grossly intact [No Focal Deficits] : no focal deficits [Normal Gait] : normal gait [Normal Affect] : the affect was normal [Normal Insight/Judgement] : insight and judgment were intact [Normal Voice/Communication] : normal voice/communication [Normal TMs] : both tympanic membranes were normal [Normal Appearance] : normal in appearance [No Masses] : no palpable masses [No Nipple Discharge] : no nipple discharge [No Axillary Lymphadenopathy] : no axillary lymphadenopathy [Normal Supraclavicular Nodes] : no supraclavicular lymphadenopathy [Speech Grossly Normal] : speech grossly normal [Memory Grossly Normal] : memory grossly normal [Alert and Oriented x3] : oriented to person, place, and time [Normal Mood] : the mood was normal [de-identified] : a female chaperone was present during breast exam. Upper outer quadrant of left breast: cluster of small cysts

## 2023-12-14 ENCOUNTER — NON-APPOINTMENT (OUTPATIENT)
Age: 63
End: 2023-12-14

## 2023-12-14 ENCOUNTER — APPOINTMENT (OUTPATIENT)
Dept: CARDIOLOGY | Facility: CLINIC | Age: 63
End: 2023-12-14
Payer: COMMERCIAL

## 2023-12-14 VITALS
OXYGEN SATURATION: 97 % | DIASTOLIC BLOOD PRESSURE: 86 MMHG | HEART RATE: 51 BPM | BODY MASS INDEX: 29.92 KG/M2 | RESPIRATION RATE: 17 BRPM | WEIGHT: 202 LBS | SYSTOLIC BLOOD PRESSURE: 133 MMHG | HEIGHT: 69 IN

## 2023-12-14 PROCEDURE — 99244 OFF/OP CNSLTJ NEW/EST MOD 40: CPT

## 2023-12-14 PROCEDURE — 93000 ELECTROCARDIOGRAM COMPLETE: CPT

## 2023-12-16 NOTE — H&P PST ADULT - NEGATIVE ENDOCRINE SYMPTOMS
[de-identified] : Assessment: Left wrist de Quervain's tenosynovitis.  Plan: 1. RICE Therapy.  Comfort Cool thumb spica/wrist wrap given. 2. Antiinflammatories/Tylenol as needed for pain of discomfort.  Diclofenac sodium gel 3% sent to her pharmacy to use as directed.  Naprosyn 500 mg sent to her pharmacy to use as directed for pain and swelling of the wrist. 3. The patient was advised to rest the wrist for 24-48 hours post injection.  The patient is able to resume normal activities in 24-48 hours post injection if the patient is experiencing minimal to no pain.  4. Continue with a home exercise/stretching program.  5. All the patients questions were answered.  If the patient is experiencing any problems or has concerns they were advised to call the office or make an appointment to come in to be evaluated.  no cold intolerance/no heat intolerance/no voice change

## 2023-12-17 NOTE — ASSESSMENT
[FreeTextEntry1] : We have reviewed a prior non dedicated CT and the  abdominal aorta looks satisfactory , however we do note ascending aortic root dilatation and this is being followed periodically. I have asked Hayley to undergo genetic evaluation with Dr. Nathan Owen at [facility] especially given concern about a 6'6" son who is active in sports and concern over the possible genetics of Marfan's although the aortic root dimension may reflect her tall stature. Her medications are reviewed and for the meantime we will be continued.  She will revisit in 3 months' time with a consideration periodic evaluation of an ascending aortic root dilatation.  medical necessity iris encounter based upon to a more cardiac issues and request for subspecialty consultation genetic counseling

## 2023-12-17 NOTE — REASON FOR VISIT
[CV Risk Factors and Non-Cardiac Disease] : CV risk factors and non-cardiac disease [Coronary Artery Disease] : coronary artery disease [FreeTextEntry3] : Dr. Willett [FreeTextEntry1] : Ms. Last is referred by Dr. Willett with history of aortic root enlargement hypertension hyperlipidemia. Treatment for hyperlipidemia has resulted in migraine headaches and therefore rosuvastatin was discontinued. She was later placed on pravastatin. She was also placed on a water pill prior to knee surgery which resulted in elevation in liver function tests. Spironolactone resulted in a spike in LFTs SGOT and SGPT. She underwent nuclear stress testing in the past that was unrevealing. She denies the stigmata of Marfan's syndrome such as ectopia lentis although she does have arthritis resulting in knee surgery 7/10/2023 Her father suffered from a ruptured abdominal aortic aneurysm although he was a smoker. A maternal grandmother had heart disease.

## 2023-12-26 DIAGNOSIS — R51.9 HEADACHE, UNSPECIFIED: ICD-10-CM

## 2024-01-09 ENCOUNTER — RX CHANGE (OUTPATIENT)
Age: 64
End: 2024-01-09

## 2024-01-09 DIAGNOSIS — U07.1 COVID-19: ICD-10-CM

## 2024-01-21 NOTE — PHYSICAL EXAM
[de-identified] : Constitutional: 63 year old female, alert and oriented, cooperative, in no acute distress.  HEENT  NC/AT.  Appearance: symmetric  Neck/Back Straight without deformity or instability.  Good ROM.  Chest/Respiratory  Respiratory effort: no intercostal retractions or use of accessory muscles. Nonlabored Breathing  Skin  On inspection, warm and dry without rashes or lesions.  Mental Status:  Judgment, insight: intact Orientation: oriented to time, place, and person  Neurological: Sensory and Motor are grossly intact throughout  Left Knee  Inspection:     Incision well healing. No erythema or drainage     No Effusion     Non-tender to palpation over tibial tubercle, patella, medial and lateral joint line, and pes insertion.  Range of Motion: 	Extension - 0 degrees 	Flexion - 125 degrees 	Alignment - Valgus 3 degrees 	Extensor lag: None  Stability:      Demonstrates no Varus or Valgus instability      Negative Anterior or Posterior drawer.      No mid flexion instability  Patella: stable, tracks well.   Neurologic Exam     Motor intact including 5/5 Extensor Hallucis Longus, 5/5 Flexor Hallucis Longus, 5/5 Tibialis Anterior and 5/5 Gastrocnemius     Sensation Intact to Light Touch including Saphenous, Sural, Superficial Peroneal, Deep Peroneal, Tibial nerve distributions  Vascular Exam     Foot is warm and well perfused with 2+ Dorsalis Pedis Pulse   No pain with range of motion of the bilateral hips or right knee. No lumbar paraspinal muscle tenderness.  [de-identified] : XRay:  XRays of the Left Knee (3 Views) taken in the office today and reviewed with the patient. XRays demonstrate a Left Total Knee Arthroplasty in good position and alignment. There is no obvious evidence of fracture, dislocation, osteolysis or loosening. (my personal interpretation) Components: Trang Triathlon PS Cemented

## 2024-01-21 NOTE — DISCUSSION/SUMMARY
[de-identified] : Catia Last is a 63-year-old female presents the office for follow-up of her left total knee arthroplasty.  Patient underwent left TKA on 7/10/2023.  She is currently doing well overall.  X-rays showed left total knee arthroplasty in good position and alignment.  Examination showed range of motion 0 to 125 degrees.  Discussed with patient the examination and imaging findings.  Discussed with the patient the recovery from total knee arthroplasty, including home exercises. Patient will continue her home exercises.  She may participate in activities as tolerated.  Patient will follow-up in 3 months for reevaluation and management.  Patient understanding and in agreement the plan.  All questions answered.  Plan: -Home Exercises -Activity as tolerated -Follow-up in 3 months for reevaluation and management

## 2024-01-21 NOTE — HISTORY OF PRESENT ILLNESS
[de-identified] : 1/23/2024  10/12/2023  Catia Last presents to the office for follow-up of her left total knee arthroplasty.  Patient underwent left TKA on 7/10/2023.  She is overall doing well.  Patient does not have knee pain at this time.  She reports occasional stiffness over the knee.  Patient is participating in home exercises and has returned to her normal activities.  No fevers or chills.  No falls or injuries.  8/29/2023  Catia Last presents to the office for follow-up of her left total knee arthroplasty.  Patient underwent left TKA on 7/10/2023.  She is overall doing well.  Her pain is well controlled at this time and she has no pain with ambulation.  Patient has some stiffness and pain when standing still and at night.  She is not taking any pain medications.  Patient continues to work well in physical therapy and has 125 degrees flexion.  She has some occasional left hip pain.  No fevers or chills.  No falls or injuries.  7/25/2023  Catia Last presents to the office for follow-up of her left total knee arthroplasty.  Patient underwent left TKA on 7/10/2023.  She still has some knee pain.  Patient is currently taking tramadol and Tylenol, with occasional oxycodone.  She is currently in physical therapy and is doing well.  No falls or injuries.  No fevers or chills.  Patient states that her range of motion was measured at 120 degrees of flexion.  6/2/2023  Catia Last presents to the office for follow-up of her left knee pain.  Patient continues to have significant left knee pain that is keeping her up at night.  Pain is located to the anterior knee, but is mostly medial at this time.  She does have some posterior knee pain.  She has not noted any relieving factors at this time.  Patient has tried ice.  She has tried physical therapy, injections, and anti-inflammatories, without significant relief.  Patient is unable to take Celebrex because of a sulfa allergy.  She states that the pain is now affecting her quality of life.  Prior knee injection helped for about 1 week.  Patient underwent left knee MRI, which showed osteoarthritis.  5/5/2023 Ms. Last is a 62-year-old female presents the office for evaluation of her left knee pain.  In August 2022, patient jumped out of her bed and felt a pop in the knee, causing significant pain.  This pain then resolved.  Her pain returned in November 2022 and she has experienced consistently worsening pain.  Patient has a history of bilateral THAs by Dr. Andres and she was evaluated by Dr. Andres for her knee pain.  Patient was still not she may have a meniscus tear.  She tried physical therapy for about 2.5 months, without significant relief.  Patient underwent corticosteroid injection by Dr. Leal on 3/10/2023, which did not provide significant relief.  She has tried Tylenol and Aleve, with some relief.  Patient has a history of a sulfa allergy and is unable to take Celebrex.  She continues to have significant anterior knee pain and difficulty with standing.  There is pain with activity.  Pain is improved with sitting, rest, and ice.  She states that her pain is now affecting her quality of life.  History: HTN, Migraines, Hypothyroidism, Dilated Aortic Root, HLD, Allergy: Sulfa

## 2024-01-23 ENCOUNTER — APPOINTMENT (OUTPATIENT)
Dept: ORTHOPEDIC SURGERY | Facility: CLINIC | Age: 64
End: 2024-01-23
Payer: COMMERCIAL

## 2024-01-23 VITALS
SYSTOLIC BLOOD PRESSURE: 125 MMHG | WEIGHT: 195 LBS | BODY MASS INDEX: 27.92 KG/M2 | HEART RATE: 49 BPM | DIASTOLIC BLOOD PRESSURE: 85 MMHG | OXYGEN SATURATION: 97 % | HEIGHT: 70 IN

## 2024-01-23 DIAGNOSIS — Z96.659 PRESENCE OF UNSPECIFIED ARTIFICIAL KNEE JOINT: ICD-10-CM

## 2024-01-23 PROCEDURE — 73562 X-RAY EXAM OF KNEE 3: CPT | Mod: LT

## 2024-01-23 PROCEDURE — 99213 OFFICE O/P EST LOW 20 MIN: CPT

## 2024-01-24 NOTE — H&P PST ADULT - ENMT
No oral lesions; no gross abnormalities Doxycycline Pregnancy And Lactation Text: This medication is Pregnancy Category D and not consider safe during pregnancy. It is also excreted in breast milk but is considered safe for shorter treatment courses. SSKI Counseling:  I discussed with the patient the risks of SSKI including but not limited to thyroid abnormalities, metallic taste, GI upset, fever, headache, acne, arthralgias, paraesthesias, lymphadenopathy, easy bleeding, arrhythmias, and allergic reaction. negative Fluconazole Pregnancy And Lactation Text: This medication is Pregnancy Category C and it isn't know if it is safe during pregnancy. It is also excreted in breast milk. Cephalexin Counseling: I counseled the patient regarding use of cephalexin as an antibiotic for prophylactic and/or therapeutic purposes. Cephalexin (commonly prescribed under brand name Keflex) is a cephalosporin antibiotic which is active against numerous classes of bacteria, including most skin bacteria. Side effects may include nausea, diarrhea, gastrointestinal upset, rash, hives, yeast infections, and in rare cases, hepatitis, kidney disease, seizures, fever, confusion, neurologic symptoms, and others. Patients with severe allergies to penicillin medications are cautioned that there is about a 10% incidence of cross-reactivity with cephalosporins. When possible, patients with penicillin allergies should use alternatives to cephalosporins for antibiotic therapy. Dapsone Counseling: I discussed with the patient the risks of dapsone including but not limited to hemolytic anemia, agranulocytosis, rashes, methemoglobinemia, kidney failure, peripheral neuropathy, headaches, GI upset, and liver toxicity.  Patients who start dapsone require monitoring including baseline LFTs and weekly CBCs for the first month, then every month thereafter.  The patient verbalized understanding of the proper use and possible adverse effects of dapsone.  All of the patient's questions and concerns were addressed. Albendazole Pregnancy And Lactation Text: This medication is Pregnancy Category C and it isn't known if it is safe during pregnancy. It is also excreted in breast milk. Tremfya Counseling: I discussed with the patient the risks of guselkumab including but not limited to immunosuppression, serious infections, and drug reactions.  The patient understands that monitoring is required including a PPD at baseline and must alert us or the primary physician if symptoms of infection or other concerning signs are noted. Libtayo Pregnancy And Lactation Text: This medication is contraindicated in pregnancy and when breast feeding. Terbinafine Pregnancy And Lactation Text: This medication is Pregnancy Category B and is considered safe during pregnancy. It is also excreted in breast milk and breast feeding isn't recommended. Enbrel Counseling:  I discussed with the patient the risks of etanercept including but not limited to myelosuppression, immunosuppression, autoimmune hepatitis, demyelinating diseases, lymphoma, and infections.  The patient understands that monitoring is required including a PPD at baseline and must alert us or the primary physician if symptoms of infection or other concerning signs are noted. Valtrex Pregnancy And Lactation Text: this medication is Pregnancy Category B and is considered safe during pregnancy. This medication is not directly found in breast milk but it's metabolite acyclovir is present. Doxycycline Counseling:  Patient counseled regarding possible photosensitivity and increased risk for sunburn.  Patient instructed to avoid sunlight, if possible.  When exposed to sunlight, patients should wear protective clothing, sunglasses, and sunscreen.  The patient was instructed to call the office immediately if the following severe adverse effects occur:  hearing changes, easy bruising/bleeding, severe headache, or vision changes.  The patient verbalized understanding of the proper use and possible adverse effects of doxycycline.  All of the patient's questions and concerns were addressed. Opioid Counseling: I discussed with the patient the potential side effects of opioids including but not limited to addiction, altered mental status, and depression. I stressed avoiding alcohol, benzodiazepines, muscle relaxants and sleep aids unless specifically okayed by a physician. The patient verbalized understanding of the proper use and possible adverse effects of opioids. All of the patient's questions and concerns were addressed. They were instructed to flush the remaining pills down the toilet if they did not need them for pain. Carac Pregnancy And Lactation Text: This medication is Pregnancy Category X and contraindicated in pregnancy and in women who may become pregnant. It is unknown if this medication is excreted in breast milk. Griseofulvin Counseling:  I discussed with the patient the risks of griseofulvin including but not limited to photosensitivity, cytopenia, liver damage, nausea/vomiting and severe allergy.  The patient understands that this medication is best absorbed when taken with a fatty meal (e.g., ice cream or french fries). Protopic Counseling: Patient may experience a mild burning sensation during topical application. Protopic is not approved in children less than 2 years of age. There have been case reports of hematologic and skin malignancies in patients using topical calcineurin inhibitors although causality is questionable. 5-Fu Counseling: 5-Fluorouracil Counseling:  I discussed with the patient the risks of 5-fluorouracil including but not limited to erythema, scaling, itching, weeping, crusting, and pain. Itraconazole Counseling:  I discussed with the patient the risks of itraconazole including but not limited to liver damage, nausea/vomiting, neuropathy, and severe allergy.  The patient understands that this medication is best absorbed when taken with acidic beverages such as non-diet cola or ginger ale.  The patient understands that monitoring is required including baseline LFTs and repeat LFTs at intervals.  The patient understands that they are to contact us or the primary physician if concerning signs are noted. Dupixent Pregnancy And Lactation Text: This medication likely crosses the placenta but the risk for the fetus is uncertain. This medication is excreted in breast milk. Ivermectin Counseling:  Patient instructed to take medication on an empty stomach with a full glass of water.  Patient informed of potential adverse effects including but not limited to nausea, diarrhea, dizziness, itching, and swelling of the extremities or lymph nodes.  The patient verbalized understanding of the proper use and possible adverse effects of ivermectin.  All of the patient's questions and concerns were addressed. Cosentyx Pregnancy And Lactation Text: This medication is Pregnancy Category B and is considered safe during pregnancy. It is unknown if this medication is excreted in breast milk. Finasteride Male Counseling: Finasteride Counseling:  I discussed with the patient the risks of use of finasteride including but not limited to decreased libido, decreased ejaculate volume, gynecomastia, and depression. Women should not handle medication.  All of the patient's questions and concerns were addressed. Rituxan Pregnancy And Lactation Text: This medication is Pregnancy Category C and it isn't know if it is safe during pregnancy. It is unknown if this medication is excreted in breast milk but similar antibodies are known to be excreted. Gabapentin Pregnancy And Lactation Text: This medication is Pregnancy Category C and isn't considered safe during pregnancy. It is excreted in breast milk. Azathioprine Pregnancy And Lactation Text: This medication is Pregnancy Category D and isn't considered safe during pregnancy. It is unknown if this medication is excreted in breast milk. Niacinamide Pregnancy And Lactation Text: These medications are considered safe during pregnancy. Drysol Counseling:  I discussed with the patient the risks of drysol/aluminum chloride including but not limited to skin rash, itching, irritation, burning. Minocycline Pregnancy And Lactation Text: This medication is Pregnancy Category D and not consider safe during pregnancy. It is also excreted in breast milk. Birth Control Pills Counseling: Birth Control Pill Counseling: I discussed with the patient the potential side effects of OCPs including but not limited to increased risk of stroke, heart attack, thrombophlebitis, deep venous thrombosis, hepatic adenomas, breast changes, GI upset, headaches, and depression.  The patient verbalized understanding of the proper use and possible adverse effects of OCPs. All of the patient's questions and concerns were addressed. Mirvaso Counseling: Mirvaso is a topical medication which can decrease superficial blood flow where applied. Side effects are uncommon and include stinging, redness and allergic reactions. Erivedge Pregnancy And Lactation Text: This medication is Pregnancy Category X and is absolutely contraindicated during pregnancy. It is unknown if it is excreted in breast milk. Detail Level: Detailed Bexarotene Pregnancy And Lactation Text: This medication is Pregnancy Category X and should not be given to women who are pregnant or may become pregnant. This medication should not be used if you are breast feeding. Carac Counseling:  I discussed with the patient the risks of Carac including but not limited to erythema, scaling, itching, weeping, crusting, and pain. Wartpeel Counseling:  I discussed with the patient the risks of Wartpeel including but not limited to erythema, scaling, itching, weeping, crusting, and pain. Azathioprine Counseling:  I discussed with the patient the risks of azathioprine including but not limited to myelosuppression, immunosuppression, hepatotoxicity, lymphoma, and infections.  The patient understands that monitoring is required including baseline LFTs, Creatinine, possible TPMP genotyping and weekly CBCs for the first month and then every 2 weeks thereafter.  The patient verbalized understanding of the proper use and possible adverse effects of azathioprine.  All of the patient's questions and concerns were addressed. Ketoconazole Counseling:   Patient counseled regarding improving absorption with orange juice.  Adverse effects include but are not limited to breast enlargement, headache, diarrhea, nausea, upset stomach, liver function test abnormalities, taste disturbance, and stomach pain.  There is a rare possibility of liver failure that can occur when taking ketoconazole. The patient understands that monitoring of LFTs may be required, especially at baseline. The patient verbalized understanding of the proper use and possible adverse effects of ketoconazole.  All of the patient's questions and concerns were addressed. Calcipotriene Pregnancy And Lactation Text: This medication has not been proven safe during pregnancy. It is unknown if this medication is excreted in breast milk. Picato Pregnancy And Lactation Text: This medication is Pregnancy Category C. It is unknown if this medication is excreted in breast milk. Arava Counseling:  Patient counseled regarding adverse effects of Arava including but not limited to nausea, vomiting, abnormalities in liver function tests. Patients may develop mouth sores, rash, diarrhea, and abnormalities in blood counts. The patient understands that monitoring is required including LFTs and blood counts.  There is a rare possibility of scarring of the liver and lung problems that can occur when taking methotrexate. Persistent nausea, loss of appetite, pale stools, dark urine, cough, and shortness of breath should be reported immediately. Patient advised to discontinue Arava treatment and consult with a physician prior to attempting conception. The patient will have to undergo a treatment to eliminate Arava from the body prior to conception. Odomzo Counseling- I discussed with the patient the risks of Odomzo including but not limited to nausea, vomiting, diarrhea, constipation, weight loss, changes in the sense of taste, decreased appetite, muscle spasms, and hair loss.  The patient verbalized understanding of the proper use and possible adverse effects of Odomzo.  All of the patient's questions and concerns were addressed. Zyclara Counseling:  I discussed with the patient the risks of imiquimod including but not limited to erythema, scaling, itching, weeping, crusting, and pain.  Patient understands that the inflammatory response to imiquimod is variable from person to person and was educated regarded proper titration schedule.  If flu-like symptoms develop, patient knows to discontinue the medication and contact us. Quinolones Counseling:  I discussed with the patient the risks of fluoroquinolones including but not limited to GI upset, allergic reaction, drug rash, diarrhea, dizziness, photosensitivity, yeast infections, liver function test abnormalities, tendonitis/tendon rupture. Ketoconazole Pregnancy And Lactation Text: This medication is Pregnancy Category C and it isn't know if it is safe during pregnancy. It is also excreted in breast milk and breast feeding isn't recommended. Simponi Pregnancy And Lactation Text: The risk during pregnancy and breastfeeding is uncertain with this medication. Oxybutynin Counseling:  I discussed with the patient the risks of oxybutynin including but not limited to skin rash, drowsiness, dry mouth, difficulty urinating, and blurred vision. Spironolactone Pregnancy And Lactation Text: This medication can cause feminization of the male fetus and should be avoided during pregnancy. The active metabolite is also found in breast milk. Bexarotene Counseling:  I discussed with the patient the risks of bexarotene including but not limited to hair loss, dry lips/skin/eyes, liver abnormalities, hyperlipidemia, pancreatitis, depression/suicidal ideation, photosensitivity, drug rash/allergic reactions, hypothyroidism, anemia, leukopenia, infection, cataracts, and teratogenicity.  Patient understands that they will need regular blood tests to check lipid profile, liver function tests, white blood cell count, thyroid function tests and pregnancy test if applicable. Erivedge Counseling- I discussed with the patient the risks of Erivedge including but not limited to nausea, vomiting, diarrhea, constipation, weight loss, changes in the sense of taste, decreased appetite, muscle spasms, and hair loss.  The patient verbalized understanding of the proper use and possible adverse effects of Erivedge.  All of the patient's questions and concerns were addressed. Simponi Counseling:  I discussed with the patient the risks of golimumab including but not limited to myelosuppression, immunosuppression, autoimmune hepatitis, demyelinating diseases, lymphoma, and serious infections.  The patient understands that monitoring is required including a PPD at baseline and must alert us or the primary physician if symptoms of infection or other concerning signs are noted. Eucrisa Counseling: Patient may experience a mild burning sensation during topical application. Eucrisa is not approved in children less than 2 years of age. Benzoyl Peroxide Pregnancy And Lactation Text: This medication is Pregnancy Category C. It is unknown if benzoyl peroxide is excreted in breast milk. Hydroxychloroquine Counseling:  I discussed with the patient that a baseline ophthalmologic exam is needed at the start of therapy and every year thereafter while on therapy. A CBC may also be warranted for monitoring.  The side effects of this medication were discussed with the patient, including but not limited to agranulocytosis, aplastic anemia, seizures, rashes, retinopathy, and liver toxicity. Patient instructed to call the office should any adverse effect occur.  The patient verbalized understanding of the proper use and possible adverse effects of Plaquenil.  All the patient's questions and concerns were addressed. Imiquimod Counseling:  I discussed with the patient the risks of imiquimod including but not limited to erythema, scaling, itching, weeping, crusting, and pain.  Patient understands that the inflammatory response to imiquimod is variable from person to person and was educated regarded proper titration schedule.  If flu-like symptoms develop, patient knows to discontinue the medication and contact us. Gabapentin Counseling: I discussed with the patient the risks of gabapentin including but not limited to dizziness, somnolence, fatigue and ataxia. Hydroquinone Counseling:  Patient advised that medication may result in skin irritation, lightening (hypopigmentation), dryness, and burning.  In the event of skin irritation, the patient was advised to reduce the amount of the drug applied or use it less frequently.  Rarely, spots that are treated with hydroquinone can become darker (pseudoochronosis).  Should this occur, patient instructed to stop medication and call the office. The patient verbalized understanding of the proper use and possible adverse effects of hydroquinone.  All of the patient's questions and concerns were addressed. Rituxan Counseling:  I discussed with the patient the risks of Rituxan infusions. Side effects can include infusion reactions, severe drug rashes including mucocutaneous reactions, reactivation of latent hepatitis and other infections and rarely progressive multifocal leukoencephalopathy.  All of the patient's questions and concerns were addressed. Thalidomide Counseling: I discussed with the patient the risks of thalidomide including but not limited to birth defects, anxiety, weakness, chest pain, dizziness, cough and severe allergy. Isotretinoin Counseling: Patient should get monthly blood tests, not donate blood, not drive at night if vision affected, not share medication, and not undergo elective surgery for 6 months after tx completed. Side effects reviewed, pt to contact office should one occur. Topical Clindamycin Counseling: Patient counseled that this medication may cause skin irritation or allergic reactions.  In the event of skin irritation, the patient was advised to reduce the amount of the drug applied or use it less frequently.   The patient verbalized understanding of the proper use and possible adverse effects of clindamycin.  All of the patient's questions and concerns were addressed. Metronidazole Pregnancy And Lactation Text: This medication is Pregnancy Category B and considered safe during pregnancy.  It is also excreted in breast milk. Rhofade Pregnancy And Lactation Text: This medication has not been assigned a Pregnancy Risk Category. It is unknown if the medication is excreted in breast milk. Azithromycin Counseling:  I discussed with the patient the risks of azithromycin including but not limited to GI upset, allergic reaction, drug rash, diarrhea, and yeast infections. Minoxidil Pregnancy And Lactation Text: This medication has not been assigned a Pregnancy Risk Category but animal studies failed to show danger with the topical medication. It is unknown if the medication is excreted in breast milk. Glycopyrrolate Pregnancy And Lactation Text: This medication is Pregnancy Category B and is considered safe during pregnancy. It is unknown if it is excreted breast milk. Topical Retinoid counseling:  Patient advised to apply a pea-sized amount only at bedtime and wait 30 minutes after washing their face before applying.  If too drying, patient may add a non-comedogenic moisturizer. The patient verbalized understanding of the proper use and possible adverse effects of retinoids.  All of the patient's questions and concerns were addressed. Niacinamide Counseling: I recommended taking niacin or niacinamide, also know as vitamin B3, twice daily. Recent evidence suggests that taking vitamin B3 (500 mg twice daily) can reduce the risk of actinic keratoses and non-melanoma skin cancers. Side effects of vitamin B3 include flushing and headache. Methotrexate Pregnancy And Lactation Text: This medication is Pregnancy Category X and is known to cause fetal harm. This medication is excreted in breast milk. Rhofade Counseling: Rhofade is a topical medication which can decrease superficial blood flow where applied. Side effects are uncommon and include stinging, redness and allergic reactions. Griseofulvin Pregnancy And Lactation Text: This medication is Pregnancy Category X and is known to cause serious birth defects. It is unknown if this medication is excreted in breast milk but breast feeding should be avoided. Tranexamic Acid Pregnancy And Lactation Text: It is unknown if this medication is safe during pregnancy or breast feeding. Topical Sulfur Applications Pregnancy And Lactation Text: This medication is Pregnancy Category C and has an unknown safety profile during pregnancy. It is unknown if this topical medication is excreted in breast milk. Prednisone Pregnancy And Lactation Text: This medication is Pregnancy Category C and it isn't know if it is safe during pregnancy. This medication is excreted in breast milk. Nsaids Pregnancy And Lactation Text: These medications are considered safe up to 30 weeks gestation. It is excreted in breast milk. Taltz Counseling: I discussed with the patient the risks of ixekizumab including but not limited to immunosuppression, serious infections, worsening of inflammatory bowel disease and drug reactions.  The patient understands that monitoring is required including a PPD at baseline and must alert us or the primary physician if symptoms of infection or other concerning signs are noted. Propranolol Pregnancy And Lactation Text: This medication is Pregnancy Category C and it isn't known if it is safe during pregnancy. It is excreted in breast milk. Hydroxyzine Counseling: Patient advised that the medication is sedating and not to drive a car after taking this medication.  Patient informed of potential adverse effects including but not limited to dry mouth, urinary retention, and blurry vision.  The patient verbalized understanding of the proper use and possible adverse effects of hydroxyzine.  All of the patient's questions and concerns were addressed. Cyclophosphamide Counseling:  I discussed with the patient the risks of cyclophosphamide including but not limited to hair loss, hormonal abnormalities, decreased fertility, abdominal pain, diarrhea, nausea and vomiting, bone marrow suppression and infection. The patient understands that monitoring is required while taking this medication. Minoxidil Counseling: Minoxidil is a topical medication which can increase blood flow where it is applied. It is uncertain how this medication increases hair growth. Side effects are uncommon and include stinging and allergic reactions. Spironolactone Counseling: Patient advised regarding risks of diarrhea, abdominal pain, hyperkalemia, birth defects (for female patients), liver toxicity and renal toxicity. The patient may need blood work to monitor liver and kidney function and potassium levels while on therapy. The patient verbalized understanding of the proper use and possible adverse effects of spironolactone.  All of the patient's questions and concerns were addressed. Bactrim Counseling:  I discussed with the patient the risks of sulfa antibiotics including but not limited to GI upset, allergic reaction, drug rash, diarrhea, dizziness, photosensitivity, and yeast infections.  Rarely, more serious reactions can occur including but not limited to aplastic anemia, agranulocytosis, methemoglobinemia, blood dyscrasias, liver or kidney failure, lung infiltrates or desquamative/blistering drug rashes. Cimzia Counseling:  I discussed with the patient the risks of Cimzia including but not limited to immunosuppression, allergic reactions and infections.  The patient understands that monitoring is required including a PPD at baseline and must alert us or the primary physician if symptoms of infection or other concerning signs are noted. Dupixent Counseling: I discussed with the patient the risks of dupilumab including but not limited to eye infection and irritation, cold sores, injection site reactions, worsening of asthma, allergic reactions and increased risk of parasitic infection.  Live vaccines should be avoided while taking dupilumab. Dupilumab will also interact with certain medications such as warfarin and cyclosporine. The patient understands that monitoring is required and they must alert us or the primary physician if symptoms of infection or other concerning signs are noted. Elidel Counseling: Patient may experience a mild burning sensation during topical application. Elidel is not approved in children less than 2 years of age. There have been case reports of hematologic and skin malignancies in patients using topical calcineurin inhibitors although causality is questionable. Erythromycin Counseling:  I discussed with the patient the risks of erythromycin including but not limited to GI upset, allergic reaction, drug rash, diarrhea, increase in liver enzymes, and yeast infections. Xolair Pregnancy And Lactation Text: This medication is Pregnancy Category B and is considered safe during pregnancy. This medication is excreted in breast milk. Bactrim Pregnancy And Lactation Text: This medication is Pregnancy Category D and is known to cause fetal risk.  It is also excreted in breast milk. Cyclophosphamide Pregnancy And Lactation Text: This medication is Pregnancy Category D and it isn't considered safe during pregnancy. This medication is excreted in breast milk. Infliximab Counseling:  I discussed with the patient the risks of infliximab including but not limited to myelosuppression, immunosuppression, autoimmune hepatitis, demyelinating diseases, lymphoma, and serious infections.  The patient understands that monitoring is required including a PPD at baseline and must alert us or the primary physician if symptoms of infection or other concerning signs are noted. Cyclosporine Counseling:  I discussed with the patient the risks of cyclosporine including but not limited to hypertension, gingival hyperplasia,myelosuppression, immunosuppression, liver damage, kidney damage, neurotoxicity, lymphoma, and serious infections. The patient understands that monitoring is required including baseline blood pressure, CBC, CMP, lipid panel and uric acid, and then 1-2 times monthly CMP and blood pressure. Picato Counseling:  I discussed with the patient the risks of Picato including but not limited to erythema, scaling, itching, weeping, crusting, and pain. Calcipotriene Counseling:  I discussed with the patient the risks of calcipotriene including but not limited to erythema, scaling, itching, and irritation. Acitretin Pregnancy And Lactation Text: This medication is Pregnancy Category X and should not be given to women who are pregnant or may become pregnant in the future. This medication is excreted in breast milk. Fluconazole Counseling:  Patient counseled regarding adverse effects of fluconazole including but not limited to headache, diarrhea, nausea, upset stomach, liver function test abnormalities, taste disturbance, and stomach pain.  There is a rare possibility of liver failure that can occur when taking fluconazole.  The patient understands that monitoring of LFTs and kidney function test may be required, especially at baseline. The patient verbalized understanding of the proper use and possible adverse effects of fluconazole.  All of the patient's questions and concerns were addressed. High Dose Vitamin A Pregnancy And Lactation Text: High dose vitamin A therapy is contraindicated during pregnancy and breast feeding. Albendazole Counseling:  I discussed with the patient the risks of albendazole including but not limited to cytopenia, kidney damage, nausea/vomiting and severe allergy.  The patient understands that this medication is being used in an off-label manner. Erythromycin Pregnancy And Lactation Text: This medication is Pregnancy Category B and is considered safe during pregnancy. It is also excreted in breast milk. Sski Pregnancy And Lactation Text: This medication is Pregnancy Category D and isn't considered safe during pregnancy. It is excreted in breast milk. Skyrizi Counseling: I discussed with the patient the risks of risankizumab-rzaa including but not limited to immunosuppression, and serious infections.  The patient understands that monitoring is required including a PPD at baseline and must alert us or the primary physician if symptoms of infection or other concerning signs are noted. Opioid Pregnancy And Lactation Text: These medications can lead to premature delivery and should be avoided during pregnancy. These medications are also present in breast milk in small amounts. Rifampin Counseling: I discussed with the patient the risks of rifampin including but not limited to liver damage, kidney damage, red-orange body fluids, nausea/vomiting and severe allergy. Otezla Counseling: The side effects of Otezla were discussed with the patient, including but not limited to worsening or new depression, weight loss, diarrhea, nausea, upper respiratory tract infection, and headache. Patient instructed to call the office should any adverse effect occur.  The patient verbalized understanding of the proper use and possible adverse effects of Otezla.  All the patient's questions and concerns were addressed. Minocycline Counseling: Patient advised regarding possible photosensitivity and discoloration of the teeth, skin, lips, tongue and gums.  Patient instructed to avoid sunlight, if possible.  When exposed to sunlight, patients should wear protective clothing, sunglasses, and sunscreen.  The patient was instructed to call the office immediately if the following severe adverse effects occur:  hearing changes, easy bruising/bleeding, severe headache, or vision changes.  The patient verbalized understanding of the proper use and possible adverse effects of minocycline.  All of the patient's questions and concerns were addressed. Otezla Pregnancy And Lactation Text: This medication is Pregnancy Category C and it isn't known if it is safe during pregnancy. It is unknown if it is excreted in breast milk. Cimzia Pregnancy And Lactation Text: This medication crosses the placenta but can be considered safe in certain situations. Cimzia may be excreted in breast milk. Solaraze Pregnancy And Lactation Text: This medication is Pregnancy Category B and is considered safe. There is some data to suggest avoiding during the third trimester. It is unknown if this medication is excreted in breast milk. Libtayo Counseling- I discussed with the patient the risks of Libtayo including but not limited to nausea, vomiting, diarrhea, and bone or muscle pain.  The patient verbalized understanding of the proper use and possible adverse effects of Libtayo.  All of the patient's questions and concerns were addressed. Dapsone Pregnancy And Lactation Text: This medication is Pregnancy Category C and is not considered safe during pregnancy or breast feeding. Protopic Pregnancy And Lactation Text: This medication is Pregnancy Category C. It is unknown if this medication is excreted in breast milk when applied topically. Cellcept Counseling:  I discussed with the patient the risks of mycophenolate mofetil including but not limited to infection/immunosuppression, GI upset, hypokalemia, hypercholesterolemia, bone marrow suppression, lymphoproliferative disorders, malignancy, GI ulceration/bleed/perforation, colitis, interstitial lung disease, kidney failure, progressive multifocal leukoencephalopathy, and birth defects.  The patient understands that monitoring is required including a baseline creatinine and regular CBC testing. In addition, patient must alert us immediately if symptoms of infection or other concerning signs are noted. Sarecycline Counseling: Patient advised regarding possible photosensitivity and discoloration of the teeth, skin, lips, tongue and gums.  Patient instructed to avoid sunlight, if possible.  When exposed to sunlight, patients should wear protective clothing, sunglasses, and sunscreen.  The patient was instructed to call the office immediately if the following severe adverse effects occur:  hearing changes, easy bruising/bleeding, severe headache, or vision changes.  The patient verbalized understanding of the proper use and possible adverse effects of sarecycline.  All of the patient's questions and concerns were addressed. Tazorac Pregnancy And Lactation Text: This medication is not safe during pregnancy. It is unknown if this medication is excreted in breast milk. Glycopyrrolate Counseling:  I discussed with the patient the risks of glycopyrrolate including but not limited to skin rash, drowsiness, dry mouth, difficulty urinating, and blurred vision. Colchicine Counseling:  Patient counseled regarding adverse effects including but not limited to stomach upset (nausea, vomiting, stomach pain, or diarrhea).  Patient instructed to limit alcohol consumption while taking this medication.  Colchicine may reduce blood counts especially with prolonged use.  The patient understands that monitoring of kidney function and blood counts may be required, especially at baseline. The patient verbalized understanding of the proper use and possible adverse effects of colchicine.  All of the patient's questions and concerns were addressed. Ilumya Counseling: I discussed with the patient the risks of tildrakizumab including but not limited to immunosuppression, malignancy, posterior leukoencephalopathy syndrome, and serious infections.  The patient understands that monitoring is required including a PPD at baseline and must alert us or the primary physician if symptoms of infection or other concerning signs are noted. Topical Sulfur Applications Counseling: Topical Sulfur Counseling: Patient counseled that this medication may cause skin irritation or allergic reactions.  In the event of skin irritation, the patient was advised to reduce the amount of the drug applied or use it less frequently.   The patient verbalized understanding of the proper use and possible adverse effects of topical sulfur application.  All of the patient's questions and concerns were addressed. Benzoyl Peroxide Counseling: Patient counseled that medicine may cause skin irritation and bleach clothing.  In the event of skin irritation, the patient was advised to reduce the amount of the drug applied or use it less frequently.   The patient verbalized understanding of the proper use and possible adverse effects of benzoyl peroxide.  All of the patient's questions and concerns were addressed. Isotretinoin Pregnancy And Lactation Text: This medication is Pregnancy Category X and is considered extremely dangerous during pregnancy. It is unknown if it is excreted in breast milk. Solaraze Counseling:  I discussed with the patient the risks of Solaraze including but not limited to erythema, scaling, itching, weeping, crusting, and pain. Metronidazole Counseling:  I discussed with the patient the risks of metronidazole including but not limited to seizures, nausea/vomiting, a metallic taste in the mouth, nausea/vomiting and severe allergy. Birth Control Pills Pregnancy And Lactation Text: This medication should be avoided if pregnant and for the first 30 days post-partum. Acitretin Counseling:  I discussed with the patient the risks of acitretin including but not limited to hair loss, dry lips/skin/eyes, liver damage, hyperlipidemia, depression/suicidal ideation, photosensitivity.  Serious rare side effects can include but are not limited to pancreatitis, pseudotumor cerebri, bony changes, clot formation/stroke/heart attack.  Patient understands that alcohol is contraindicated since it can result in liver toxicity and significantly prolong the elimination of the drug by many years. Cephalexin Pregnancy And Lactation Text: This medication is Pregnancy Category B and considered safe during pregnancy.  It is also excreted in breast milk but can be used safely for shorter doses. Clindamycin Pregnancy And Lactation Text: This medication can be used in pregnancy if certain situations. Clindamycin is also present in breast milk. Cimetidine Counseling:  I discussed with the patient the risks of Cimetidine including but not limited to gynecomastia, headache, diarrhea, nausea, drowsiness, arrhythmias, pancreatitis, skin rashes, psychosis, bone marrow suppression and kidney toxicity. Hydroxyzine Pregnancy And Lactation Text: This medication is not safe during pregnancy and should not be taken. It is also excreted in breast milk and breast feeding isn't recommended. Tranexamic Acid Counseling:  Patient advised of the small risk of bleeding problems with tranexamic acid. They were also instructed to call if they developed any nausea, vomiting or diarrhea. All of the patient's questions and concerns were addressed. Azithromycin Pregnancy And Lactation Text: This medication is considered safe during pregnancy and is also secreted in breast milk. Methotrexate Counseling:  Patient counseled regarding adverse effects of methotrexate including but not limited to nausea, vomiting, abnormalities in liver function tests. Patients may develop mouth sores, rash, diarrhea, and abnormalities in blood counts. The patient understands that monitoring is required including LFT's and blood counts.  There is a rare possibility of scarring of the liver and lung problems that can occur when taking methotrexate. Persistent nausea, loss of appetite, pale stools, dark urine, cough, and shortness of breath should be reported immediately. Patient advised to discontinue methotrexate treatment at least three months before attempting to become pregnant.  I discussed the need for folate supplements while taking methotrexate.  These supplements can decrease side effects during methotrexate treatment. The patient verbalized understanding of the proper use and possible adverse effects of methotrexate.  All of the patient's questions and concerns were addressed. Stelara Counseling:  I discussed with the patient the risks of ustekinumab including but not limited to immunosuppression, malignancy, posterior leukoencephalopathy syndrome, and serious infections.  The patient understands that monitoring is required including a PPD at baseline and must alert us or the primary physician if symptoms of infection or other concerning signs are noted. Oxybutynin Pregnancy And Lactation Text: This medication is Pregnancy Category B and is considered safe during pregnancy. It is unknown if it is excreted in breast milk. Nsaids Counseling: NSAID Counseling: I discussed with the patient that NSAIDs should be taken with food. Prolonged use of NSAIDs can result in the development of stomach ulcers.  Patient advised to stop taking NSAIDs if abdominal pain occurs.  The patient verbalized understanding of the proper use and possible adverse effects of NSAIDs.  All of the patient's questions and concerns were addressed. Prednisone Counseling:  I discussed with the patient the risks of prolonged use of prednisone including but not limited to weight gain, insomnia, osteoporosis, mood changes, diabetes, susceptibility to infection, glaucoma and high blood pressure.  In cases where prednisone use is prolonged, patients should be monitored with blood pressure checks, serum glucose levels and an eye exam.  Additionally, the patient may need to be placed on GI prophylaxis, PCP prophylaxis, and calcium and vitamin D supplementation and/or a bisphosphonate.  The patient verbalized understanding of the proper use and the possible adverse effects of prednisone.  All of the patient's questions and concerns were addressed. Siliq Counseling:  I discussed with the patient the risks of Siliq including but not limited to new or worsening depression, suicidal thoughts and behavior, immunosuppression, malignancy, posterior leukoencephalopathy syndrome, and serious infections.  The patient understands that monitoring is required including a PPD at baseline and must alert us or the primary physician if symptoms of infection or other concerning signs are noted. There is also a special program designed to monitor depression which is required with Siliq. Humira Counseling:  I discussed with the patient the risks of adalimumab including but not limited to myelosuppression, immunosuppression, autoimmune hepatitis, demyelinating diseases, lymphoma, and serious infections.  The patient understands that monitoring is required including a PPD at baseline and must alert us or the primary physician if symptoms of infection or other concerning signs are noted. Xeltracyz Pregnancy And Lactation Text: This medication is Pregnancy Category D and is not considered safe during pregnancy.  The risk during breast feeding is also uncertain. Tazorac Counseling:  Patient advised that medication is irritating and drying.  Patient may need to apply sparingly and wash off after an hour before eventually leaving it on overnight.  The patient verbalized understanding of the proper use and possible adverse effects of tazorac.  All of the patient's questions and concerns were addressed. High Dose Vitamin A Counseling: Side effects reviewed, pt to contact office should one occur. Include Pregnancy/Lactation Warning?: No Valtrex Counseling: I discussed with the patient the risks of valacyclovir including but not limited to kidney damage, nausea, vomiting and severe allergy.  The patient understands that if the infection seems to be worsening or is not improving, they are to call. Clindamycin Counseling: I counseled the patient regarding use of clindamycin as an antibiotic for prophylactic and/or therapeutic purposes. Clindamycin is active against numerous classes of bacteria, including skin bacteria. Side effects may include nausea, diarrhea, gastrointestinal upset, rash, hives, yeast infections, and in rare cases, colitis. Terbinafine Counseling: Patient counseling regarding adverse effects of terbinafine including but not limited to headache, diarrhea, rash, upset stomach, liver function test abnormalities, itching, taste/smell disturbance, nausea, abdominal pain, and flatulence.  There is a rare possibility of liver failure that can occur when taking terbinafine.  The patient understands that a baseline LFT and kidney function test may be required. The patient verbalized understanding of the proper use and possible adverse effects of terbinafine.  All of the patient's questions and concerns were addressed. Xeljanz Counseling: I discussed with the patient the risks of Xeljanz therapy including increased risk of infection, liver issues, headache, diarrhea, or cold symptoms. Live vaccines should be avoided. They were instructed to call if they have any problems. Xolair Counseling:  Patient informed of potential adverse effects including but not limited to fever, muscle aches, rash and allergic reactions.  The patient verbalized understanding of the proper use and possible adverse effects of Xolair.  All of the patient's questions and concerns were addressed. Tetracycline Counseling: Patient counseled regarding possible photosensitivity and increased risk for sunburn.  Patient instructed to avoid sunlight, if possible.  When exposed to sunlight, patients should wear protective clothing, sunglasses, and sunscreen.  The patient was instructed to call the office immediately if the following severe adverse effects occur:  hearing changes, easy bruising/bleeding, severe headache, or vision changes.  The patient verbalized understanding of the proper use and possible adverse effects of tetracycline.  All of the patient's questions and concerns were addressed. Patient understands to avoid pregnancy while on therapy due to potential birth defects. Drysol Pregnancy And Lactation Text: This medication is considered safe during pregnancy and breast feeding. Hydroxychloroquine Pregnancy And Lactation Text: This medication has been shown to cause fetal harm but it isn't assigned a Pregnancy Risk Category. There are small amounts excreted in breast milk. Propranolol Counseling:  I discussed with the patient the risks of propranolol including but not limited to low heart rate, low blood pressure, low blood sugar, restlessness and increased cold sensitivity. They should call the office if they experience any of these side effects. Doxepin Counseling:  Patient advised that the medication is sedating and not to drive a car after taking this medication. Patient informed of potential adverse effects including but not limited to dry mouth, urinary retention, and blurry vision.  The patient verbalized understanding of the proper use and possible adverse effects of doxepin.  All of the patient's questions and concerns were addressed. Doxepin Pregnancy And Lactation Text: This medication is Pregnancy Category C and it isn't known if it is safe during pregnancy. It is also excreted in breast milk and breast feeding isn't recommended. Rifampin Pregnancy And Lactation Text: This medication is Pregnancy Category C and it isn't know if it is safe during pregnancy. It is also excreted in breast milk and should not be used if you are breast feeding. Finasteride Pregnancy And Lactation Text: This medication is absolutely contraindicated during pregnancy. It is unknown if it is excreted in breast milk. Cosentyx Counseling:  I discussed with the patient the risks of Cosentyx including but not limited to worsening of Crohn's disease, immunosuppression, allergic reactions and infections.  The patient understands that monitoring is required including a PPD at baseline and must alert us or the primary physician if symptoms of infection or other concerning signs are noted. Clofazimine Counseling:  I discussed with the patient the risks of clofazimine including but not limited to skin and eye pigmentation, liver damage, nausea/vomiting, gastrointestinal bleeding and allergy.

## 2024-01-27 PROBLEM — Z96.659 S/P TOTAL KNEE ARTHROPLASTY: Status: ACTIVE | Noted: 2023-07-24

## 2024-02-09 ENCOUNTER — NON-APPOINTMENT (OUTPATIENT)
Age: 64
End: 2024-02-09

## 2024-02-12 ENCOUNTER — NON-APPOINTMENT (OUTPATIENT)
Age: 64
End: 2024-02-12

## 2024-02-12 ENCOUNTER — APPOINTMENT (OUTPATIENT)
Dept: CARDIOLOGY | Facility: CLINIC | Age: 64
End: 2024-02-12
Payer: COMMERCIAL

## 2024-02-12 VITALS
WEIGHT: 195 LBS | HEIGHT: 70 IN | BODY MASS INDEX: 27.92 KG/M2 | DIASTOLIC BLOOD PRESSURE: 98 MMHG | SYSTOLIC BLOOD PRESSURE: 154 MMHG | HEART RATE: 58 BPM | OXYGEN SATURATION: 99 %

## 2024-02-12 PROCEDURE — 99244 OFF/OP CNSLTJ NEW/EST MOD 40: CPT

## 2024-02-12 PROCEDURE — 93000 ELECTROCARDIOGRAM COMPLETE: CPT

## 2024-02-12 NOTE — PLAN
[TextEntry] : 1.	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	Blood drawn today to be sent to [facility] for analysis, pending insurance authorization.  3.	 Ms. LOAN LYN  was provided an information sheet about her genetic testing.  4.	A follow-up appointment was scheduled in 2-3 months to discuss genetic testing results in person. Results generally return in 6-8 weeks.  For any additional questions please call  Paradise Pretty MS, CGC or Nathan Owen MD, PhD at [phone] Time spent counseling the patient during the visit was 60 min. >50% time spent in care coordination /counseling for discussion of cardiac risk and appropriate management.   patient seen with Paradise Pretty MS, CGC for genetic counselling

## 2024-02-12 NOTE — FAMILY HISTORY
[FreeTextEntry1] : FamilyHistory_20_twCiteListControlStart FamilyHistory_20_twCiteListControlEnd [id number]  A four-generation family history was constructed and scanned into [employer].  Family history is significant for: AA in father  son tall stature   sister dec 59 yo hx MV disorder (details unk) unk cause of death after ETOH use mother MVP  maternal aunt dec 59yo MI diabetes  father dec 80yo AAA , prior smoker paternal grandfather aortic rupture dec 45 yo  paternal grandmother dec 70s  large colon polypm dec internal bleeding post resection  children: 38 yo son tall 6ft 6in , MVP  son 5 ft 11in 46 yo: daughter 15 yo 5ft 11in,  daughter 34 yo   her maternal families originate from [place], [facility], [place], [place]  and paternal families originate from [place]  +Catholic ancestry.  unk if Ashkenazi  Family history was negative for consanguinity   No family history of SIDS    [FreeTextEntry2] : [place], [place], [place], [place]  [FreeTextEntry3] : [place]

## 2024-02-12 NOTE — PHYSICAL EXAM
[Extraocular Movements Intact] : extraocular movements were intact [Pectus Deformity] : no pectus deformity [Normal] : without joint laxity or contractures [Scoliosis] : no scoliosis [Tremor] : no tremor [de-identified] : long face [FreeTextEntry2] : 180 [FreeTextEntry3] : 185 [FreeTextEntry6] : 96 [TWNoteComboBox1] : 0 [TWNoteComboBox2] : 0 [TWNoteComboBox3] : 0 [TWNoteComboBox4] : 0 [TWNoteComboBox5] : 0 [TWNoteComboBox6] : 0 [de-identified] : 0 [de-identified] : 0 [de-identified] : 0 [Right] : Right: N [Left] : Left: N [] : No [Can you now (or could you ever) place your hands flat on the floor without bending your knees?] : Patient can (or previously) place hands flat on the floor without bending their knees [Can you now (or could you ever) bend your thumb to touch your forearm?] : Patient can (or previously) bend their thumb to touch their forearm

## 2024-02-12 NOTE — ASSESSMENT
[TextEntry] : LOAN LYN is a 62 yo F PMH aortic root enlargement hypertension hyperlipidemia. hx lens ectopia family hx grandfather ruptured AAA and father with AA. The differences between hereditary and sporadic aortic aneurysm were reviewed with the patient.  She  has elected to undergo genetic testing due to concerns for  personal history, definitive diagnosis, disease management, family history, concern for the health of family members . Results may change medical management for the patient and may affect the healthcare of other family members. She  expressed understanding of the presented information and satisfaction with having all of Her questions and concerns addressed

## 2024-02-12 NOTE — SIGNATURES
[TextEntry] : Nathan Owen MD, PhD  Medical Director Program for Cardiac Genetics, Genomics and Precision Medicine Department of Cardiology [facility]  [employer] at [address] [place] [address] Tel: [phone] Fax: [phone]  ([facility] office) [address], 3rd floor (between [address] and [address]) [place] [address] (p) [phone] (f) [phone]

## 2024-02-12 NOTE — HISTORY OF PRESENT ILLNESS
[FreeTextEntry1] : LOAN LYN is a 64 yo F PMH aortic root enlargement hypertension hyperlipidemia. hypothyroidism, modertate MR  family hx father ruptured AAA  she was diagnosed 2 years ago, currently being monitored    today she  is referred for a cardiogenomic evaluation

## 2024-02-12 NOTE — REASON FOR VISIT
[FreeTextEntry3] : Dear Dr. Willett and Dr. Johnston       . I saw your patient LOAN LYN on 02/12/2024 . Please see the note below for the assessment and plan.   LOAN LYN  was seen  for an initial consultation at the Cardiogenomics Program at [facility] on 02/12/2024.   Ms. LYN was referred by Dr. Johnston for hereditary cardiac predisposition risk assessment and counseling, due to hx AA)

## 2024-02-12 NOTE — DISCUSSION/SUMMARY
[TextEntry] : We reviewed the risks, benefits, limitations, and implications of genetic testing.  Additionally, we examined the patients motivation for testing, and the emotional ramifications of the test results.  The patient is aware that results may impact family members.  Counseling resources are available if requested.   JI, the Genetic Information Non-discrimination Act protects most people from discrimination in health insurance and employment at firms with over 50 employees.  JI does not protect against use of genetic information by life insurance, disability, or long-term care insurers.  Further information on other limitations is available at [email or web address].org.   After a review of testing options, the patient elected to the Marfan TAAD panel offered through [employer]. . Panels contain 26  genes associated with varying levels of risk for aortic aneurysms. We reviewed the three possible results for each gene on this panel: positive, negative and variant of uncertain significance (VUS).  We discussed that panel testing could result in incidental findings, such as identifying a positive result in a gene with cardiac risks not seen in the current personal or discussed family history. If results are positive, we would discuss the  risks and management options associated with that cardiac condition susceptibility gene and discuss genetic testing for family members.  Pedigree was reviewed with the patient to identify those who should be tested if the patient is positive.  If results are negative or a VUS is identified, the patient would continue to be managed based on personal and family history of their  cardiac condition.

## 2024-02-29 ENCOUNTER — NON-APPOINTMENT (OUTPATIENT)
Age: 64
End: 2024-02-29

## 2024-03-01 ENCOUNTER — RX RENEWAL (OUTPATIENT)
Age: 64
End: 2024-03-01

## 2024-03-01 LAB — MARFAN SYNDROME/TAAD SEQUENCING DEL/DUP: ABNORMAL

## 2024-03-27 ENCOUNTER — RX CHANGE (OUTPATIENT)
Age: 64
End: 2024-03-27

## 2024-03-27 RX ORDER — PRAVASTATIN SODIUM 10 MG/1
10 TABLET ORAL
Qty: 30 | Refills: 1 | Status: DISCONTINUED | COMMUNITY
Start: 2023-10-29 | End: 2024-03-27

## 2024-04-10 ENCOUNTER — APPOINTMENT (OUTPATIENT)
Dept: CARDIOLOGY | Facility: CLINIC | Age: 64
End: 2024-04-10
Payer: COMMERCIAL

## 2024-04-10 PROCEDURE — 99215 OFFICE O/P EST HI 40 MIN: CPT

## 2024-04-10 NOTE — PHYSICAL EXAM
[Extraocular Movements Intact] : extraocular movements were intact [Normal] : without joint laxity or contractures [Can you now (or could you ever) place your hands flat on the floor without bending your knees?] : Patient can (or previously) place hands flat on the floor without bending their knees [Can you now (or could you ever) bend your thumb to touch your forearm?] : Patient can (or previously) bend their thumb to touch their forearm [Pectus Deformity] : no pectus deformity [Scoliosis] : no scoliosis [Tremor] : no tremor [de-identified] : long face [FreeTextEntry1] : 0 [FreeTextEntry2] : 180 [FreeTextEntry3] : 185 [FreeTextEntry4] : 1.02 [FreeTextEntry5] : 84 [FreeTextEntry6] : 96 [FreeTextEntry7] : 0.87 [TWNoteComboBox1] : 0 [TWNoteComboBox2] : 0 [TWNoteComboBox3] : 0 [TWNoteComboBox4] : 0 [TWNoteComboBox5] : 0 [TWNoteComboBox6] : 0 [de-identified] : 0 [de-identified] : 0 [de-identified] : 0 [de-identified] : 0 [de-identified] : 0 [Right] : Right: N [Left] : Left: N [] : No

## 2024-04-10 NOTE — SIGNATURES
[TextEntry] : Nathan Owen MD, PhD  Medical Director Program for Cardiac Genetics, Genomics and Precision Medicine Department of Cardiology [facility]  [employer] at [address] [place] [address] Tel: [phone] Fax: [phone]  ([facility] office) [address], 3rd floor (between [address] and [address]) [place] [address] (p) [phone] (f) [phone]

## 2024-04-10 NOTE — REASON FOR VISIT
[FreeTextEntry3] : Dear Dr. Willett and Dr. Johnston       . I saw your patient LOAN LYN on 04/10/2024 . Please see the note below for the assessment and plan.   LOAN LYN  was seen  for an initial consultation at the Cardiogenomics Program at [facility] on 02/12/2024.   Ms. LYN was referred by Dr. Johnston for hereditary cardiac predisposition risk assessment and counseling, due to hx AA

## 2024-04-10 NOTE — CONSULT LETTER
[Dear  ___] : Dear  [unfilled], [Consult Letter:] : I had the pleasure of evaluating your patient, [unfilled]. [Please see my note below.] : Please see my note below. [Consult Closing:] : Thank you very much for allowing me to participate in the care of this patient.  If you have any questions, please do not hesitate to contact me. [Sincerely,] : Sincerely, [DrJavier ___] : Dr. WARD [FreeTextEntry3] : Nathan Owen MD, PhD  Medical Director Program for Cardiac Genetics, Genomics and Precision Medicine Department of Cardiology [facility]  [employer] at [address] [place] [address] Tel: [phone] Fax: [phone]  ([facility] office) [address], 3rd floor (between [address] and [address]) [place] [address] (p) [phone] (f) [phone]

## 2024-04-10 NOTE — HISTORY OF PRESENT ILLNESS
[Home] : at home, [unfilled] , at the time of the visit. [Medical Office: ([facility])___] : at the medical office located in  [Verbal consent obtained from patient] : the patient, [unfilled] [FreeTextEntry1] : LOAN LYN is a 64 yo F PMH aortic root enlargement hypertension hyperlipidemia. hypothyroidism, modertate MR  family hx father ruptured AAA  she was diagnosed 2 years ago, currently being monitored   she underwent genetic testing and today presents for results

## 2024-04-10 NOTE — DISCUSSION/SUMMARY
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  Clinically she does not have clinical criteria for MFS.  the limitations of genetic testing were discussed with LOAN LYN    for her reccomend continued medical care as per her cardiology team  for her family At this time we recommend all of her  first degree relatives undergo a clinical cardiac evaluation with history, physical exam, EKG and ECHO. May consider additional cardiac imaging.  If their initial clinical evaluation is normal they should continued to have clinical cardiac evaluation with imaging every 3-5 years (for adults).     FBN2 The FBN2 gene encodes fibrillin 2 that, along with fibrillin 1, composes major elements of the extracellular matrix and connective tissue throughout the body. Pathogenic variants in FBN2 are typically associated with congenital contractural arachnodactyly, an autosomal dominant disorder with features of tall stature, arachnodactyly, and scoliosis similar to Marfan syndrome but distinguished by multiple congenital contractures and abnormalities of the external ear (PMID: 77228656). Affected individuals may also have bowed long bones, muscular hypoplasia, aortic root dilatation, myopia, and craniofacial dysmorphism (PMID: 26138993). The phenotype associated with pathogenic variants in the FBN2 gene is variable in severity, and joint contractures can be mild or improve with age in some individuals. Variation in the FBN2 gene has also been reported to be associated with a risk for intracranial aneurysms, with one study finding individuals with intracranial aneurysms had more genetic variation in the FBN2 gene in comparison to control individuals (PMID: 82532922). A heterozygous variant in the FBN2 gene has also been reported in multiple individuals from a family with acromelic dysplasia characterized by short stature and brachydactyly. Other variable features in some individuals in this family include toe walking, early onset carpal tunnel syndrome, and short palpebral fissures (PMID: 97425096). Further evidence is needed to clarify the possible link between variants in FBN2 and acromelic dysplasia. p.(Phv886Pay) (CCC>TCC): c.2053 C>T in exon 15 of the FBN2 gene ([id number]) Not observed at significant frequency in large population cohorts (gnomAD) In silico analysis supports that this missense variant does not alter protein structure/function Has not been previously published as pathogenic or benign to our knowledge Although located in a calcium-binding EGF-like domain of the FBN2 gene, it does not substitute or introduce a cysteine residue (PMID: 12205173, 52390431) We interpret this as a Variant of Uncertain Significance.

## 2024-04-10 NOTE — RESULTS/DATA
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

## 2024-04-10 NOTE — FAMILY HISTORY
[FreeTextEntry1] : FamilyHistory_20_twCiteListControlStart FamilyHistory_20_twCiteListControlEnd [id number]  A four-generation family history was constructed and scanned into [employer].  Family history is significant for: AA in father  son tall stature   sister dec 61 yo hx MV disorder (details unk) unk cause of death after ETOH use mother MVP  maternal aunt dec 59yo MI diabetes  father dec 80yo AAA , prior smoker paternal grandfather aortic rupture dec 43 yo  paternal grandmother dec 70s  large colon polypm dec internal bleeding post resection  children: 38 yo son tall 6ft 6in , MVP  son 5 ft 11in 46 yo: daughter 13 yo 5ft 11in,  daughter 34 yo   her maternal families originate from [place], [facility], [place], [place]  and paternal families originate from [place]  +[place]ah[place] ancestry.  unk if Ashkenazi  Family history was negative for consanguinity   No family history of SIDS    [FreeTextEntry2] : [place], [place], [place], [place]  [FreeTextEntry3] : [place]

## 2024-04-10 NOTE — ASSESSMENT
[TextEntry] : LOAN LYN is a 62 yo F PMH aortic root enlargement hypertension hyperlipidemia. hx lens ectopia family hx grandfather ruptured AAA and father with AA.  she underwent genetic testing. results are negative   for her reccomend continued medical care as per her cardiology team this genetic test result along with her clinical evaluation will not qualify her to be intervened on at the aortic size for genetic aortopathy  for her family At this time we recommend all of her  first degree relatives undergo a clinical cardiac evaluation with history, physical exam, EKG and ECHO. May consider additional cardiac imaging.  If their initial clinical evaluation is normal they should continued to have clinical cardiac evaluation with imaging every 3-5 years (for adults).      Genetic knowledge changes rapidly.  We encourage re-contacting the cardiogenomics program at [facility] if there are significant changes in family or personal health, and annually. LOAN LYN expressed understanding of the presented information and satisfaction with having her questions and concerns addressed.

## 2024-04-10 NOTE — PLAN
[TextEntry] : See above note for recommended management. A copy of genetic testing results and clinic note will be sent to  the referring cardiologist   or physician We encourage sharing these results with family members,   Contact the Cardiogenomics program at [facility] or the lab directly every few years to check on any changes in interpretation of a VUS, or if there are changes in the personal or family cardiac history.   Long-term management and surveillance for patient should be based on the patients clinical treatment as recommended by their cardiologist.   Any changes in cardiac surveillance should be discussed with the patients physician.  We remain available should there be any new information for personal or family history.  If there are any additional questions, please feel free to call the Cardiogenomics program at [facility], Paradise Pretty MS, ANKIT or Nathan Owen MD, PhD at [phone] Time spent counseling the patient during the visit was 45 min. I spent 45 minutes on the encounter   Patient seen with Paradise Pretty MS, CGC, board certified genetic counsellor

## 2024-04-11 ENCOUNTER — APPOINTMENT (OUTPATIENT)
Dept: FAMILY MEDICINE | Facility: CLINIC | Age: 64
End: 2024-04-11
Payer: COMMERCIAL

## 2024-04-11 VITALS
DIASTOLIC BLOOD PRESSURE: 78 MMHG | WEIGHT: 202 LBS | RESPIRATION RATE: 16 BRPM | OXYGEN SATURATION: 100 % | HEIGHT: 70 IN | BODY MASS INDEX: 28.92 KG/M2 | TEMPERATURE: 97.9 F | HEART RATE: 60 BPM | SYSTOLIC BLOOD PRESSURE: 125 MMHG

## 2024-04-11 DIAGNOSIS — I77.810 THORACIC AORTIC ECTASIA: ICD-10-CM

## 2024-04-11 DIAGNOSIS — I10 ESSENTIAL (PRIMARY) HYPERTENSION: ICD-10-CM

## 2024-04-11 DIAGNOSIS — Z23 ENCOUNTER FOR IMMUNIZATION: ICD-10-CM

## 2024-04-11 DIAGNOSIS — Z13.820 ENCOUNTER FOR SCREENING FOR OSTEOPOROSIS: ICD-10-CM

## 2024-04-11 PROCEDURE — G2211 COMPLEX E/M VISIT ADD ON: CPT

## 2024-04-11 PROCEDURE — 99213 OFFICE O/P EST LOW 20 MIN: CPT

## 2024-04-11 RX ORDER — LEVOTHYROXINE SODIUM 0.11 MG/1
112 TABLET ORAL
Qty: 30 | Refills: 3 | Status: ACTIVE | COMMUNITY
Start: 2023-04-21

## 2024-04-11 RX ORDER — NIRMATRELVIR AND RITONAVIR 300-100 MG
20 X 150 MG & KIT ORAL
Qty: 1 | Refills: 0 | Status: DISCONTINUED | COMMUNITY
Start: 2024-01-09 | End: 2024-04-11

## 2024-04-11 RX ORDER — TRAMADOL HYDROCHLORIDE 50 MG/1
50 TABLET, COATED ORAL TWICE DAILY
Qty: 14 | Refills: 0 | Status: DISCONTINUED | COMMUNITY
Start: 2023-08-07 | End: 2024-04-11

## 2024-04-11 NOTE — ASSESSMENT
[FreeTextEntry1] : DEXA scan ordered  RTO for PCV20 after turning 64 May return annually or sooner prn   I am providing continuous care for this patient that includes testing, discussion of options for treatment, and shared decision making with regard to the chosen treatment.
Statement Selected

## 2024-04-11 NOTE — HISTORY OF PRESENT ILLNESS
[FreeTextEntry8] : 64yo female with history of hypothyroidism, hyperlipidemia, aortic root enlargement, hypertension presents to establish care with new pcp.  She is a former . 3 children and 7 grandchildren. Takes care of grandchildren most of her days. Cares for her  who has diabetes. She was worked up for Marfan's syndrome given aortic dilation and her son is 6' 6'' but tests were not conclusive.   s/p Hip and knee replacement   Healthcare Maintenance  Pap smear - total hysterectomy Mammo - 12/2023 Colon ca screening - 2022, repeat 5-10 years  DEXA - no prior  Specialist Dr. Johnston - cardio  Uro-gyn- recurrent UTI, had surgery to repair pelvic floor

## 2024-04-12 ENCOUNTER — APPOINTMENT (OUTPATIENT)
Dept: CARDIOLOGY | Facility: CLINIC | Age: 64
End: 2024-04-12
Payer: COMMERCIAL

## 2024-04-12 VITALS
HEART RATE: 61 BPM | BODY MASS INDEX: 29.2 KG/M2 | HEIGHT: 70 IN | OXYGEN SATURATION: 97 % | DIASTOLIC BLOOD PRESSURE: 94 MMHG | SYSTOLIC BLOOD PRESSURE: 136 MMHG | WEIGHT: 204 LBS

## 2024-04-12 PROCEDURE — 93000 ELECTROCARDIOGRAM COMPLETE: CPT

## 2024-04-12 PROCEDURE — 99214 OFFICE O/P EST MOD 30 MIN: CPT | Mod: 25

## 2024-04-19 ENCOUNTER — RX RENEWAL (OUTPATIENT)
Age: 64
End: 2024-04-19

## 2024-04-19 RX ORDER — ESTRADIOL 10 UG/1
10 TABLET VAGINAL
Qty: 24 | Refills: 1 | Status: ACTIVE | COMMUNITY
Start: 2020-03-30 | End: 1900-01-01

## 2024-04-23 ENCOUNTER — NON-APPOINTMENT (OUTPATIENT)
Age: 64
End: 2024-04-23

## 2024-04-25 ENCOUNTER — APPOINTMENT (OUTPATIENT)
Dept: CT IMAGING | Facility: CLINIC | Age: 64
End: 2024-04-25
Payer: COMMERCIAL

## 2024-04-25 ENCOUNTER — OUTPATIENT (OUTPATIENT)
Dept: OUTPATIENT SERVICES | Facility: HOSPITAL | Age: 64
LOS: 1 days | End: 2024-04-25
Payer: COMMERCIAL

## 2024-04-25 DIAGNOSIS — I77.810 THORACIC AORTIC ECTASIA: ICD-10-CM

## 2024-04-25 DIAGNOSIS — Z96.642 PRESENCE OF LEFT ARTIFICIAL HIP JOINT: Chronic | ICD-10-CM

## 2024-04-25 DIAGNOSIS — Z00.8 ENCOUNTER FOR OTHER GENERAL EXAMINATION: ICD-10-CM

## 2024-04-25 DIAGNOSIS — N81.9 FEMALE GENITAL PROLAPSE, UNSPECIFIED: Chronic | ICD-10-CM

## 2024-04-25 DIAGNOSIS — Z96.641 PRESENCE OF RIGHT ARTIFICIAL HIP JOINT: Chronic | ICD-10-CM

## 2024-04-25 DIAGNOSIS — Z90.710 ACQUIRED ABSENCE OF BOTH CERVIX AND UTERUS: Chronic | ICD-10-CM

## 2024-04-25 PROCEDURE — 71275 CT ANGIOGRAPHY CHEST: CPT

## 2024-04-25 PROCEDURE — 71275 CT ANGIOGRAPHY CHEST: CPT | Mod: 26

## 2024-05-01 RX ORDER — PRAVASTATIN SODIUM 10 MG/1
10 TABLET ORAL
Qty: 90 | Refills: 1 | Status: ACTIVE | COMMUNITY
Start: 1900-01-01 | End: 1900-01-01

## 2024-05-05 DIAGNOSIS — I51.7 CARDIOMEGALY: ICD-10-CM

## 2024-05-08 RX ORDER — SUMATRIPTAN 100 MG/1
100 TABLET, FILM COATED ORAL
Qty: 9 | Refills: 6 | Status: ACTIVE | COMMUNITY
Start: 2022-04-05 | End: 1900-01-01

## 2024-05-20 ENCOUNTER — APPOINTMENT (OUTPATIENT)
Dept: NEUROLOGY | Facility: CLINIC | Age: 64
End: 2024-05-20
Payer: COMMERCIAL

## 2024-05-20 VITALS
WEIGHT: 200 LBS | BODY MASS INDEX: 28.63 KG/M2 | TEMPERATURE: 97.8 F | DIASTOLIC BLOOD PRESSURE: 79 MMHG | SYSTOLIC BLOOD PRESSURE: 120 MMHG | HEIGHT: 70 IN | HEART RATE: 52 BPM

## 2024-05-20 DIAGNOSIS — G43.019 MIGRAINE W/OUT AURA, INTRACTABLE, W/OUT STATUS MIGRAINOSUS: ICD-10-CM

## 2024-05-20 DIAGNOSIS — Z86.69 PERSONAL HISTORY OF OTHER DISEASES OF THE NERVOUS SYSTEM AND SENSE ORGANS: ICD-10-CM

## 2024-05-20 PROCEDURE — 99204 OFFICE O/P NEW MOD 45 MIN: CPT

## 2024-05-20 NOTE — PHYSICAL EXAM
[General Appearance - Alert] : alert [General Appearance - In No Acute Distress] : in no acute distress [Oriented To Time, Place, And Person] : oriented to person, place, and time [Impaired Insight] : insight and judgment were intact [Affect] : the affect was normal [Person] : oriented to person [Place] : oriented to place [Time] : oriented to time [Registration Intact] : recent registration memory intact [Concentration Intact] : normal concentrating ability [Naming Objects] : no difficulty naming common objects [Repeating Phrases] : no difficulty repeating a phrase [Fluency] : fluency intact [Comprehension] : comprehension intact [Past History] : adequate knowledge of personal past history [Cranial Nerves Optic (II)] : visual acuity intact bilaterally,  visual fields full to confrontation, pupils equal round and reactive to light [Cranial Nerves Oculomotor (III)] : extraocular motion intact [Cranial Nerves Trigeminal (V)] : facial sensation intact symmetrically [Cranial Nerves Facial (VII)] : face symmetrical [Cranial Nerves Vestibulocochlear (VIII)] : hearing was intact bilaterally [Cranial Nerves Glossopharyngeal (IX)] : tongue and palate midline [Cranial Nerves Accessory (XI - Cranial And Spinal)] : head turning and shoulder shrug symmetric [Cranial Nerves Hypoglossal (XII)] : there was no tongue deviation with protrusion [Motor Tone] : muscle tone was normal in all four extremities [Motor Strength] : muscle strength was normal in all four extremities [No Muscle Atrophy] : normal bulk in all four extremities [Sensation Tactile Decrease] : light touch was intact [Abnormal Walk] : normal gait [Balance] : balance was intact [2+] : Patella left 2+ [1+] : Ankle jerk left 1+ [Past-pointing] : there was no past-pointing [Tremor] : no tremor present [Plantar Reflex Right Only] : normal on the right [Plantar Reflex Left Only] : normal on the left

## 2024-05-20 NOTE — REVIEW OF SYSTEMS
[Negative] : Heme/Lymph [Poor Coordination] : poor coordination [Migraine Headache] : migraine headaches [As Noted in HPI] : as noted in HPI [FreeTextEntry9] : chronic neck pain

## 2024-05-20 NOTE — HISTORY OF PRESENT ILLNESS
[FreeTextEntry1] : 63-year-old right-handed female with PMHx of HTN, HLD, hypothyroidism, migraine/cluster headaches, has noted remarkable reduction in the frequency of headaches over the past decade, currently gets 2-3 migraines without aura per year.  Patient reports at age 44, she started experiencing severe unrelenting right sided cluster headaches, they lasted for about 6 weeks, resolved only with Zomig, a year later, she had another cluster for about 4-5 weeks, again resolved with Zomig, she has not had any more cluster headaches since, but started following up with migraines associated with photophobia, nausea, vomiting and occasional scintillating scotomas.  Patient reports these headaches lasted anywhere between 4-5, resolved with Zomig however as the medication was not covered under her insurance, she was prescribed sumatriptan by Dr. Guidry, the neurologist, she responded well and has been taking sumatriptan 100 Mg at the onset of migraines usually aborts the headache within an hour.  Patient reports that she has not followed up with the neurologist for over 6-7 years, she has had MRI brain scan, last one was in December 2023 which revealed white matter microvascular ischemic change which could be seen in chronic migraines.  Patient has been getting her sumatriptan from PMD, she has recently changed over to Dr. Block.  At present, patient reports 2-3 migraines without aura per year, these get aborted with sumatriptan 100 Mg pill, these are not associated with visual aura, she does report that she has occasional sinus headaches which are perinasal/frontal occur 3-4 times per year, usually seasonal.  Patient would like to know if she needs a repeat MRI brain every year

## 2024-05-20 NOTE — DISCUSSION/SUMMARY
[FreeTextEntry1] : 63-year-old F with PMHx of HTN, HLD, hypothyroidism, migraine/cluster headaches, has noted remarkable reduction in the frequency of headaches overpast decade, currently gets 2-3 migraines w/o aura per year.  # Migraines without aura 2-3/year  #History of cluster headaches and migraines without aura no recurrence in 15 years  -As the patient has had good response with with sumatriptan, and has few headaches per year, has no contraindication for it right now, she may continue taking sumatriptan 100 Mg for abortive therapy - Patient educated regarding migraines - At this time, as explained to the patient, there is no need to repeat MRI every year, MRI brain from 12/23 showed no acute findings. Follow-up with me in 6-month

## 2024-05-20 NOTE — REASON FOR VISIT
[Consultation] : a consultation visit [FreeTextEntry1] : For evaluation of migraine headaches, ? need for repeat MRI brain scan

## 2024-05-22 ENCOUNTER — APPOINTMENT (OUTPATIENT)
Dept: CARDIOLOGY | Facility: CLINIC | Age: 64
End: 2024-05-22
Payer: COMMERCIAL

## 2024-05-22 PROCEDURE — 93306 TTE W/DOPPLER COMPLETE: CPT

## 2024-05-23 NOTE — ASSESSMENT
[FreeTextEntry1] : We have reviewed a prior non dedicated CT and the  abdominal aorta looks satisfactory , however we do note ascending aortic root dilatation and this is being followed periodically. I have asked Hayley to undergo genetic evaluation with Dr. Nathan Owen at [facility] especially given concern about a 6'6" son who is active in sports and concern over the possible genetics of Marfan's although the aortic root dimension may reflect her tall stature. Her medications are reviewed and for the meantime we will be continued.  She will revisit in 3 months' time with a consideration periodic evaluation of an ascending aortic root dilatation.  6/15/24 an aortic cta is requested to evaluate the aorta given  dilatation noted on echo at 4.5 cm and given an increased side from 4.2 cm to 4.5 cm a cta aorta is requested. medical necessity this is a high risk encounter based upon two or more cardiac issues and request for subspecialty consultation genetic counseling

## 2024-06-18 ENCOUNTER — RX RENEWAL (OUTPATIENT)
Age: 64
End: 2024-06-18

## 2024-06-18 RX ORDER — LEVOTHYROXINE SODIUM 0.12 MG/1
125 TABLET ORAL
Qty: 90 | Refills: 0 | Status: ACTIVE | COMMUNITY
Start: 1900-01-01 | End: 1900-01-01

## 2024-06-18 RX ORDER — GABAPENTIN 600 MG/1
600 TABLET, COATED ORAL
Qty: 270 | Refills: 3 | Status: ACTIVE | COMMUNITY
Start: 2021-06-11 | End: 1900-01-01

## 2024-06-20 RX ORDER — ZOLPIDEM TARTRATE 10 MG/1
10 TABLET ORAL
Qty: 30 | Refills: 0 | Status: ACTIVE | COMMUNITY
Start: 2018-12-19 | End: 1900-01-01

## 2024-07-08 ENCOUNTER — APPOINTMENT (OUTPATIENT)
Dept: ORTHOPEDIC SURGERY | Facility: CLINIC | Age: 64
End: 2024-07-08
Payer: COMMERCIAL

## 2024-07-08 VITALS — WEIGHT: 200 LBS | HEIGHT: 70 IN | BODY MASS INDEX: 28.63 KG/M2

## 2024-07-08 PROCEDURE — 99213 OFFICE O/P EST LOW 20 MIN: CPT

## 2024-07-08 PROCEDURE — 73502 X-RAY EXAM HIP UNI 2-3 VIEWS: CPT

## 2024-07-08 NOTE — PHYSICAL THERAPY INITIAL EVALUATION ADULT - CRITERIA FOR SKILLED THERAPEUTIC INTERVENTIONS
Can you let him know that I am going to send a refill of his on his medication, but I recommend that he schedule an appointment for August for a follow-up and some follow-up blood work? impairments found/functional limitations in following categories/risk reduction/prevention/rehab potential/therapy frequency/predicted duration of therapy intervention/anticipated discharge recommendation

## 2024-07-15 ENCOUNTER — APPOINTMENT (OUTPATIENT)
Dept: ORTHOPEDIC SURGERY | Facility: CLINIC | Age: 64
End: 2024-07-15
Payer: COMMERCIAL

## 2024-07-15 VITALS — HEIGHT: 70 IN | BODY MASS INDEX: 28.63 KG/M2 | WEIGHT: 200 LBS

## 2024-07-15 DIAGNOSIS — Z96.641 PRESENCE OF RIGHT ARTIFICIAL HIP JOINT: ICD-10-CM

## 2024-07-15 DIAGNOSIS — Z96.642 PRESENCE OF LEFT ARTIFICIAL HIP JOINT: ICD-10-CM

## 2024-07-15 PROCEDURE — 99213 OFFICE O/P EST LOW 20 MIN: CPT

## 2024-07-26 ENCOUNTER — APPOINTMENT (OUTPATIENT)
Dept: ORTHOPEDIC SURGERY | Facility: CLINIC | Age: 64
End: 2024-07-26
Payer: COMMERCIAL

## 2024-07-26 VITALS — BODY MASS INDEX: 28.63 KG/M2 | HEIGHT: 70 IN | WEIGHT: 200 LBS

## 2024-07-26 DIAGNOSIS — Z96.659 PRESENCE OF UNSPECIFIED ARTIFICIAL KNEE JOINT: ICD-10-CM

## 2024-07-26 PROCEDURE — 73562 X-RAY EXAM OF KNEE 3: CPT | Mod: LT

## 2024-07-26 PROCEDURE — 99213 OFFICE O/P EST LOW 20 MIN: CPT

## 2024-07-29 NOTE — DISCUSSION/SUMMARY
[de-identified] : Catia Last is a 63-year-old female presents the office for follow-up of her left total knee arthroplasty.  Patient underwent left TKA on 7/10/2023.  She is currently doing well overall.  X-rays showed left total knee arthroplasty in good position and alignment.  Examination showed range of motion 0 to 125 degrees.  Discussed with patient the examination and imaging findings.  Discussed with the patient the recovery from total knee arthroplasty, including home exercises. Patient will continue her home exercises.  She may participate in activities as tolerated.  Patient will follow-up in 1 year for reevaluation and management.  Patient understanding and in agreement the plan.  All questions answered.  Plan: -Home Exercises -Activity as tolerated -Follow-up in 1 year for reevaluation and management

## 2024-07-29 NOTE — DISCUSSION/SUMMARY
[de-identified] : Catia Last is a 63-year-old female presents the office for follow-up of her left total knee arthroplasty.  Patient underwent left TKA on 7/10/2023.  She is currently doing well overall.  X-rays showed left total knee arthroplasty in good position and alignment.  Examination showed range of motion 0 to 125 degrees.  Discussed with patient the examination and imaging findings.  Discussed with the patient the recovery from total knee arthroplasty, including home exercises. Patient will continue her home exercises.  She may participate in activities as tolerated.  Patient will follow-up in 1 year for reevaluation and management.  Patient understanding and in agreement the plan.  All questions answered.  Plan: -Home Exercises -Activity as tolerated -Follow-up in 1 year for reevaluation and management

## 2024-07-29 NOTE — PHYSICAL EXAM
[de-identified] : Constitutional: 64 year old female, alert and oriented, cooperative, in no acute distress.  HEENT  NC/AT.  Appearance: symmetric  Neck/Back Straight without deformity or instability.  Good ROM.  Chest/Respiratory  Respiratory effort: no intercostal retractions or use of accessory muscles. Nonlabored Breathing  Skin  On inspection, warm and dry without rashes or lesions.  Mental Status:  Judgment, insight: intact Orientation: oriented to time, place, and person  Neurological: Sensory and Motor are grossly intact throughout  Left Knee  Inspection:     Incision well healing. No erythema or drainage     No Effusion     Non-tender to palpation over tibial tubercle, patella, medial and lateral joint line, and pes insertion.  Range of Motion: 	Extension - 0 degrees 	Flexion - 125 degrees 	Alignment - Valgus 3 degrees 	Extensor lag: None  Stability:      Demonstrates no Varus or Valgus instability      Negative Anterior or Posterior drawer.      No mid flexion instability  Patella: stable, tracks well.   Neurologic Exam     Motor intact including 5/5 Extensor Hallucis Longus, 5/5 Flexor Hallucis Longus, 5/5 Tibialis Anterior and 5/5 Gastrocnemius     Sensation Intact to Light Touch including Saphenous, Sural, Superficial Peroneal, Deep Peroneal, Tibial nerve distributions  Vascular Exam     Foot is warm and well perfused with 2+ Dorsalis Pedis Pulse   No pain with range of motion of the bilateral hips or right knee. No lumbar paraspinal muscle tenderness.  [de-identified] : XRay:  XRays of the Left Knee (3 Views) taken in the office today and reviewed with the patient. XRays demonstrate a Left Total Knee Arthroplasty in good position and alignment. There is no obvious evidence of fracture, dislocation, osteolysis or loosening. (my personal interpretation) Components: Trang Tri[employer] PS Cemented

## 2024-07-29 NOTE — HISTORY OF PRESENT ILLNESS
[de-identified] : 7/26/2024  Catia Last presents to the office for follow-up of her left total knee arthroplasty.  Patient is currently doing well.  Her knee is doing well.  She did have a fall onto her left hip, where x-rays were negative.  No fevers or chills.  10/12/2023  Catia Last presents to the office for follow-up of her left total knee arthroplasty.  Patient underwent left TKA on 7/10/2023.  She is overall doing well.  Patient does not have knee pain at this time.  She reports occasional stiffness over the knee.  Patient is participating in home exercises and has returned to her normal activities.  No fevers or chills.  No falls or injuries.  8/29/2023  Catia Last presents to the office for follow-up of her left total knee arthroplasty.  Patient underwent left TKA on 7/10/2023.  She is overall doing well.  Her pain is well controlled at this time and she has no pain with ambulation.  Patient has some stiffness and pain when standing still and at night.  She is not taking any pain medications.  Patient continues to work well in physical therapy and has 125 degrees flexion.  She has some occasional left hip pain.  No fevers or chills.  No falls or injuries.  7/25/2023  Catia Last presents to the office for follow-up of her left total knee arthroplasty.  Patient underwent left TKA on 7/10/2023.  She still has some knee pain.  Patient is currently taking tramadol and Tylenol, with occasional oxycodone.  She is currently in physical therapy and is doing well.  No falls or injuries.  No fevers or chills.  Patient states that her range of motion was measured at 120 degrees of flexion.  6/2/2023  Catia Last presents to the office for follow-up of her left knee pain.  Patient continues to have significant left knee pain that is keeping her up at night.  Pain is located to the anterior knee, but is mostly medial at this time.  She does have some posterior knee pain.  She has not noted any relieving factors at this time.  Patient has tried ice.  She has tried physical therapy, injections, and anti-inflammatories, without significant relief.  Patient is unable to take Celebrex because of a sulfa allergy.  She states that the pain is now affecting her quality of life.  Prior knee injection helped for about 1 week.  Patient underwent left knee MRI, which showed osteoarthritis.  5/5/2023 Ms. Last is a 62-year-old female presents the office for evaluation of her left knee pain.  In August 2022, patient jumped out of her bed and felt a pop in the knee, causing significant pain.  This pain then resolved.  Her pain returned in November 2022 and she has experienced consistently worsening pain.  Patient has a history of bilateral THAs by Dr. Andres and she was evaluated by Dr. Andres for her knee pain.  Patient was still not she may have a meniscus tear.  She tried physical therapy for about 2.5 months, without significant relief.  Patient underwent corticosteroid injection by Dr. Leal on 3/10/2023, which did not provide significant relief.  She has tried Tylenol and Aleve, with some relief.  Patient has a history of a sulfa allergy and is unable to take Celebrex.  She continues to have significant anterior knee pain and difficulty with standing.  There is pain with activity.  Pain is improved with sitting, rest, and ice.  She states that her pain is now affecting her quality of life.  History: HTN, Migraines, Hypothyroidism, Dilated Aortic Root, HLD, Allergy: Sulfa

## 2024-07-29 NOTE — PHYSICAL EXAM
[de-identified] : Constitutional: 64 year old female, alert and oriented, cooperative, in no acute distress.  HEENT  NC/AT.  Appearance: symmetric  Neck/Back Straight without deformity or instability.  Good ROM.  Chest/Respiratory  Respiratory effort: no intercostal retractions or use of accessory muscles. Nonlabored Breathing  Skin  On inspection, warm and dry without rashes or lesions.  Mental Status:  Judgment, insight: intact Orientation: oriented to time, place, and person  Neurological: Sensory and Motor are grossly intact throughout  Left Knee  Inspection:     Incision well healing. No erythema or drainage     No Effusion     Non-tender to palpation over tibial tubercle, patella, medial and lateral joint line, and pes insertion.  Range of Motion: 	Extension - 0 degrees 	Flexion - 125 degrees 	Alignment - Valgus 3 degrees 	Extensor lag: None  Stability:      Demonstrates no Varus or Valgus instability      Negative Anterior or Posterior drawer.      No mid flexion instability  Patella: stable, tracks well.   Neurologic Exam     Motor intact including 5/5 Extensor Hallucis Longus, 5/5 Flexor Hallucis Longus, 5/5 Tibialis Anterior and 5/5 Gastrocnemius     Sensation Intact to Light Touch including Saphenous, Sural, Superficial Peroneal, Deep Peroneal, Tibial nerve distributions  Vascular Exam     Foot is warm and well perfused with 2+ Dorsalis Pedis Pulse   No pain with range of motion of the bilateral hips or right knee. No lumbar paraspinal muscle tenderness.  [de-identified] : XRay:  XRays of the Left Knee (3 Views) taken in the office today and reviewed with the patient. XRays demonstrate a Left Total Knee Arthroplasty in good position and alignment. There is no obvious evidence of fracture, dislocation, osteolysis or loosening. (my personal interpretation) Components: Trang Tri[employer] PS Cemented

## 2024-07-29 NOTE — HISTORY OF PRESENT ILLNESS
[de-identified] : 7/26/2024  Catia Last presents to the office for follow-up of her left total knee arthroplasty.  Patient is currently doing well.  Her knee is doing well.  She did have a fall onto her left hip, where x-rays were negative.  No fevers or chills.  10/12/2023  Catia Last presents to the office for follow-up of her left total knee arthroplasty.  Patient underwent left TKA on 7/10/2023.  She is overall doing well.  Patient does not have knee pain at this time.  She reports occasional stiffness over the knee.  Patient is participating in home exercises and has returned to her normal activities.  No fevers or chills.  No falls or injuries.  8/29/2023  Catia Last presents to the office for follow-up of her left total knee arthroplasty.  Patient underwent left TKA on 7/10/2023.  She is overall doing well.  Her pain is well controlled at this time and she has no pain with ambulation.  Patient has some stiffness and pain when standing still and at night.  She is not taking any pain medications.  Patient continues to work well in physical therapy and has 125 degrees flexion.  She has some occasional left hip pain.  No fevers or chills.  No falls or injuries.  7/25/2023  Catia Last presents to the office for follow-up of her left total knee arthroplasty.  Patient underwent left TKA on 7/10/2023.  She still has some knee pain.  Patient is currently taking tramadol and Tylenol, with occasional oxycodone.  She is currently in physical therapy and is doing well.  No falls or injuries.  No fevers or chills.  Patient states that her range of motion was measured at 120 degrees of flexion.  6/2/2023  Catia Last presents to the office for follow-up of her left knee pain.  Patient continues to have significant left knee pain that is keeping her up at night.  Pain is located to the anterior knee, but is mostly medial at this time.  She does have some posterior knee pain.  She has not noted any relieving factors at this time.  Patient has tried ice.  She has tried physical therapy, injections, and anti-inflammatories, without significant relief.  Patient is unable to take Celebrex because of a sulfa allergy.  She states that the pain is now affecting her quality of life.  Prior knee injection helped for about 1 week.  Patient underwent left knee MRI, which showed osteoarthritis.  5/5/2023 Ms. Last is a 62-year-old female presents the office for evaluation of her left knee pain.  In August 2022, patient jumped out of her bed and felt a pop in the knee, causing significant pain.  This pain then resolved.  Her pain returned in November 2022 and she has experienced consistently worsening pain.  Patient has a history of bilateral THAs by Dr. Andres and she was evaluated by Dr. Andres for her knee pain.  Patient was still not she may have a meniscus tear.  She tried physical therapy for about 2.5 months, without significant relief.  Patient underwent corticosteroid injection by Dr. Leal on 3/10/2023, which did not provide significant relief.  She has tried Tylenol and Aleve, with some relief.  Patient has a history of a sulfa allergy and is unable to take Celebrex.  She continues to have significant anterior knee pain and difficulty with standing.  There is pain with activity.  Pain is improved with sitting, rest, and ice.  She states that her pain is now affecting her quality of life.  History: HTN, Migraines, Hypothyroidism, Dilated Aortic Root, HLD, Allergy: Sulfa

## 2024-08-13 ENCOUNTER — NON-APPOINTMENT (OUTPATIENT)
Age: 64
End: 2024-08-13

## 2024-08-13 ENCOUNTER — APPOINTMENT (OUTPATIENT)
Dept: ORTHOPEDIC SURGERY | Facility: CLINIC | Age: 64
End: 2024-08-13

## 2024-08-13 VITALS — BODY MASS INDEX: 28.63 KG/M2 | WEIGHT: 200 LBS | HEIGHT: 70 IN

## 2024-08-13 DIAGNOSIS — M67.40 GANGLION, UNSPECIFIED SITE: ICD-10-CM

## 2024-08-13 DIAGNOSIS — M65.9 SYNOVITIS AND TENOSYNOVITIS, UNSPECIFIED: ICD-10-CM

## 2024-08-13 DIAGNOSIS — M67.432 GANGLION, LEFT WRIST: ICD-10-CM

## 2024-08-13 PROCEDURE — 99214 OFFICE O/P EST MOD 30 MIN: CPT | Mod: 25

## 2024-08-13 PROCEDURE — 20612 ASPIRATE/INJ GANGLION CYST: CPT

## 2024-08-13 PROCEDURE — 99204 OFFICE O/P NEW MOD 45 MIN: CPT | Mod: 25

## 2024-08-13 NOTE — PHYSICAL EXAM
[de-identified] : Right hand no discoloration No tenderness to palpation along cyst around base of 5th metacarpal  FROM Normal sensation [de-identified] : 8/13/24: Right wrist xrays Soft tissue mass on ulnar aspect of the hand "double bubble" Basal joint osteoarthritis  Calcium deposit in wrist

## 2024-08-13 NOTE — HISTORY OF PRESENT ILLNESS
[FreeTextEntry1] : 64 year old female presents with right ulnar sided wrist pain. s/p right wrist surgery for removal of ganglion cyst 201 outside of [facility].  Patient noticed swelling return after the surgery and was told that it was tenosynovitis. She states that the swelling has been getting worse traveling up her wrist. C/o tenderness.   She notes the size of the cyst has increase.  Patient is not currently taking any medication.   No family history of rheumatological conditions She has not had a rheumatology work up   History of B hip replacement surgery, left TKR.

## 2024-08-13 NOTE — HISTORY OF PRESENT ILLNESS
[FreeTextEntry1] : 64 year old female presents with right ulnar sided wrist pain. s/p right wrist surgery for removal of ganglion cyst 201 outside of [facility].  Patient noticed swelling return after the surgery and was told that it was tenosynovitis. She states that the swelling has been getting worse traveling up her wrist. C/o tenderness.   She notes the size of the cyst has increase.  Patient is not currently taking any medication.   No family history of rheumatological conditions She has not had a rheumatology work up   History of B hip replacement surgery, left TKR.

## 2024-08-13 NOTE — PHYSICAL EXAM
[de-identified] : Right hand no discoloration No tenderness to palpation along cyst around base of 5th metacarpal  FROM Normal sensation [de-identified] : 8/13/24: Right wrist xrays Soft tissue mass on ulnar aspect of the hand "double bubble" Basal joint osteoarthritis  Calcium deposit in wrist

## 2024-08-13 NOTE — PROCEDURE
[FreeTextEntry1] : Right hand aspiration 8/13/24.  Skin prepped with alcohol and betadine Ethyl chloride spray applied  18g needle used to aspirate .8cc of clear yellow gel. Consistent with ganglion cyst.

## 2024-08-13 NOTE — ASSESSMENT
[FreeTextEntry1] : Right hand aspiration 8/13/24.  Skin prepped with alcohol  Ethyl chloride spray applied  18g needle used to aspirate .8cc of clear yellow gel. Consistent with ganglion cyst.    Ganglion cyst treatment options include future aspirations or surgery. Treatment will be determined by level of pain and size.  Patient advised to see a rheumatologist  Seeing her PCP in October and will do blood tests then to rule out any inflammatory condition

## 2024-08-21 NOTE — DATA REVIEWED
[de-identified] : 12/19/2023: MRI brain: New scattered foci of increased signal intensity within the white matter may represent might microvascular ischemic change or be related to chronic migraine headaches.  Study compared with prior of 2/22/2007 2/24/2007: MRI/MRI brain: Low-lying cerebellar tonsils, otherwise unremarkable.  Normal MRA of the intracranial circulation
What Type Of Note Output Would You Prefer (Optional)?: Bullet Format
How Severe Is Your Skin Lesion?: moderate
Has Your Skin Lesion Been Treated?: not been treated
Is This A New Presentation, Or A Follow-Up?: Skin Lesion

## 2024-08-26 ENCOUNTER — APPOINTMENT (OUTPATIENT)
Dept: FAMILY MEDICINE | Facility: CLINIC | Age: 64
End: 2024-08-26
Payer: MEDICARE

## 2024-08-26 VITALS
TEMPERATURE: 98.5 F | DIASTOLIC BLOOD PRESSURE: 77 MMHG | WEIGHT: 203 LBS | OXYGEN SATURATION: 97 % | BODY MASS INDEX: 29.13 KG/M2 | HEART RATE: 62 BPM | RESPIRATION RATE: 16 BRPM | SYSTOLIC BLOOD PRESSURE: 154 MMHG

## 2024-08-26 DIAGNOSIS — M54.2 CERVICALGIA: ICD-10-CM

## 2024-08-26 PROCEDURE — 99203 OFFICE O/P NEW LOW 30 MIN: CPT

## 2024-08-26 PROCEDURE — G2211 COMPLEX E/M VISIT ADD ON: CPT

## 2024-08-26 NOTE — HISTORY OF PRESENT ILLNESS
[FreeTextEntry8] : 64yo female with history of hypothyroidism, hyperlipidemia, aortic root enlargement, hypertension presents with concern for left neck pain. She notes a history of cervical spine damage and osteoarthritis, presented with discomfort on the left side of her neck that has been occurring for the past three months. She reported that the discomfort is not severe but noticeable, and it comes and goes about four to five times a week. The discomfort is more pronounced when watching TV or lying in bed, and she has not tried any medication or treatment for it.

## 2024-08-26 NOTE — PHYSICAL EXAM
[No Lymphadenopathy] : no lymphadenopathy [Supple] : supple [Thyroid Normal, No Nodules] : the thyroid was normal and there were no nodules present [No Respiratory Distress] : no respiratory distress  [Coordination Grossly Intact] : coordination grossly intact [No Focal Deficits] : no focal deficits [Normal] : affect was normal and insight and judgment were intact [de-identified] : full ROM of cervical spine. Normal motor strength. No tenderness to palpation.

## 2024-08-26 NOTE — PLAN
[FreeTextEntry1] : I am providing continuous care for this patient that includes testing, discussion of options for treatment, and shared decision making with regard to the chosen treatment.

## 2024-09-16 ENCOUNTER — RX RENEWAL (OUTPATIENT)
Age: 64
End: 2024-09-16

## 2024-10-11 ENCOUNTER — RX RENEWAL (OUTPATIENT)
Age: 64
End: 2024-10-11

## 2024-11-04 NOTE — H&P PST ADULT - RS GEN PE MLT RESP DETAILS PC
135.5 normal/breath sounds equal/respirations non-labored/clear to auscultation bilaterally/good air movement/airway patent

## 2024-11-20 DIAGNOSIS — E55.9 VITAMIN D DEFICIENCY, UNSPECIFIED: ICD-10-CM

## 2024-11-20 DIAGNOSIS — R31.29 OTHER MICROSCOPIC HEMATURIA: ICD-10-CM

## 2024-11-20 DIAGNOSIS — E78.5 HYPERLIPIDEMIA, UNSPECIFIED: ICD-10-CM

## 2024-11-20 DIAGNOSIS — R74.01 ELEVATION OF LEVELS OF LIVER TRANSAMINASE LEVELS: ICD-10-CM

## 2024-11-20 DIAGNOSIS — R79.89 OTHER SPECIFIED ABNORMAL FINDINGS OF BLOOD CHEMISTRY: ICD-10-CM

## 2024-11-20 DIAGNOSIS — E03.9 HYPOTHYROIDISM, UNSPECIFIED: ICD-10-CM

## 2024-11-22 ENCOUNTER — OFFICE (OUTPATIENT)
Dept: URBAN - METROPOLITAN AREA CLINIC 102 | Facility: CLINIC | Age: 64
Setting detail: OPHTHALMOLOGY
End: 2024-11-22
Payer: COMMERCIAL

## 2024-11-22 DIAGNOSIS — H25.13: ICD-10-CM

## 2024-11-22 DIAGNOSIS — H40.033: ICD-10-CM

## 2024-11-22 DIAGNOSIS — G43.109: ICD-10-CM

## 2024-11-22 PROCEDURE — 92133 CPTRZD OPH DX IMG PST SGM ON: CPT | Performed by: STUDENT IN AN ORGANIZED HEALTH CARE EDUCATION/TRAINING PROGRAM

## 2024-11-22 PROCEDURE — 92020 GONIOSCOPY: CPT | Performed by: STUDENT IN AN ORGANIZED HEALTH CARE EDUCATION/TRAINING PROGRAM

## 2024-11-22 PROCEDURE — 99213 OFFICE O/P EST LOW 20 MIN: CPT | Performed by: STUDENT IN AN ORGANIZED HEALTH CARE EDUCATION/TRAINING PROGRAM

## 2024-11-22 PROCEDURE — 92083 EXTENDED VISUAL FIELD XM: CPT | Performed by: STUDENT IN AN ORGANIZED HEALTH CARE EDUCATION/TRAINING PROGRAM

## 2024-11-22 ASSESSMENT — CONFRONTATIONAL VISUAL FIELD TEST (CVF)
OS_FINDINGS: FULL
OD_FINDINGS: FULL

## 2024-11-22 ASSESSMENT — DRY EYES - PHYSICIAN NOTES
OS_GENERALCOMMENTS: DEBRIS IN TEAR FILM
OD_GENERALCOMMENTS: DEBRIS IN TEAR FILM

## 2024-11-25 PROBLEM — H40.033 NARROW ANGLE GLAUCOMA; BOTH EYES: Status: ACTIVE | Noted: 2024-11-22

## 2024-11-25 ASSESSMENT — REFRACTION_CURRENTRX
OS_VPRISM_DIRECTION: PROGS
OS_AXIS: 180
OD_OVR_VA: 20/
OS_ADD: +2.50
OD_SPHERE: +1.25
OD_VPRISM_DIRECTION: PROGS
OS_SPHERE: +1.75
OD_AXIS: 180
OS_OVR_VA: 20/
OD_ADD: +2.50

## 2024-11-25 ASSESSMENT — REFRACTION_AUTOREFRACTION
OD_SPHERE: +2.25
OS_CYLINDER: -0.25
OD_CYLINDER: -0.75
OS_SPHERE: +2.50
OS_AXIS: 082
OD_AXIS: 103

## 2024-11-25 ASSESSMENT — KERATOMETRY
OS_AXISANGLE_DEGREES: 159
OD_K2POWER_DIOPTERS: 44.75
OS_K2POWER_DIOPTERS: 45.25
OS_K1POWER_DIOPTERS: 44.75
OD_K1POWER_DIOPTERS: 44.50
OD_AXISANGLE_DEGREES: 179

## 2024-11-25 ASSESSMENT — VISUAL ACUITY
OS_BCVA: 20/20
OD_BCVA: 20/20

## 2024-11-27 ENCOUNTER — EMERGENCY (EMERGENCY)
Facility: HOSPITAL | Age: 64
LOS: 0 days | Discharge: HOME | End: 2024-11-27
Attending: EMERGENCY MEDICINE
Payer: MEDICARE

## 2024-11-27 VITALS
RESPIRATION RATE: 18 BRPM | HEART RATE: 59 BPM | DIASTOLIC BLOOD PRESSURE: 88 MMHG | OXYGEN SATURATION: 100 % | TEMPERATURE: 98 F | SYSTOLIC BLOOD PRESSURE: 126 MMHG | WEIGHT: 210.32 LBS
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
## 2024-11-27 DIAGNOSIS — Z88.1 ALLERGY STATUS TO OTHER ANTIBIOTIC AGENTS: ICD-10-CM

## 2024-11-27 DIAGNOSIS — Z96.642 PRESENCE OF LEFT ARTIFICIAL HIP JOINT: Chronic | ICD-10-CM

## 2024-11-27 DIAGNOSIS — I10 ESSENTIAL (PRIMARY) HYPERTENSION: ICD-10-CM

## 2024-11-27 DIAGNOSIS — Z90.710 ACQUIRED ABSENCE OF BOTH CERVIX AND UTERUS: Chronic | ICD-10-CM

## 2024-11-27 DIAGNOSIS — Z88.2 ALLERGY STATUS TO SULFONAMIDES: ICD-10-CM

## 2024-11-27 DIAGNOSIS — K57.90 DIVERTICULOSIS OF INTESTINE, PART UNSPECIFIED, WITHOUT PERFORATION OR ABSCESS WITHOUT BLEEDING: ICD-10-CM

## 2024-11-27 DIAGNOSIS — T31.0 BURNS INVOLVING LESS THAN 10% OF BODY SURFACE: ICD-10-CM

## 2024-11-27 DIAGNOSIS — N81.9 FEMALE GENITAL PROLAPSE, UNSPECIFIED: Chronic | ICD-10-CM

## 2024-11-27 DIAGNOSIS — E03.9 HYPOTHYROIDISM, UNSPECIFIED: ICD-10-CM

## 2024-11-27 DIAGNOSIS — Z91.018 ALLERGY TO OTHER FOODS: ICD-10-CM

## 2024-11-27 DIAGNOSIS — M19.91 PRIMARY OSTEOARTHRITIS, UNSPECIFIED SITE: ICD-10-CM

## 2024-11-27 DIAGNOSIS — T22.211A BURN OF SECOND DEGREE OF RIGHT FOREARM, INITIAL ENCOUNTER: ICD-10-CM

## 2024-11-27 DIAGNOSIS — Z96.641 PRESENCE OF RIGHT ARTIFICIAL HIP JOINT: Chronic | ICD-10-CM

## 2024-11-27 DIAGNOSIS — Z23 ENCOUNTER FOR IMMUNIZATION: ICD-10-CM

## 2024-11-27 DIAGNOSIS — G25.81 RESTLESS LEGS SYNDROME: ICD-10-CM

## 2024-11-27 DIAGNOSIS — E78.5 HYPERLIPIDEMIA, UNSPECIFIED: ICD-10-CM

## 2024-11-27 PROCEDURE — 90471 IMMUNIZATION ADMIN: CPT

## 2024-11-27 PROCEDURE — 99285 EMERGENCY DEPT VISIT HI MDM: CPT | Mod: 25

## 2024-11-27 PROCEDURE — 99284 EMERGENCY DEPT VISIT MOD MDM: CPT | Mod: FS

## 2024-11-27 PROCEDURE — 90715 TDAP VACCINE 7 YRS/> IM: CPT

## 2024-11-27 RX ORDER — CLOSTRIDIUM TETANI TOXOID ANTIGEN (FORMALDEHYDE INACTIVATED), CORYNEBACTERIUM DIPHTHERIAE TOXOID ANTIGEN (FORMALDEHYDE INACTIVATED), BORDETELLA PERTUSSIS TOXOID ANTIGEN (GLUTARALDEHYDE INACTIVATED), BORDETELLA PERTUSSIS FILAMENTOUS HEMAGGLUTININ ANTIGEN (FORMALDEHYDE INACTIVATED), BORDETELLA PERTUSSIS PERTACTIN ANTIGEN, AND BORDETELLA PERTUSSIS FIMBRIAE 2/3 ANTIGEN 5; 2; 2.5; 5; 3; 5 [LF]/.5ML; [LF]/.5ML; UG/.5ML; UG/.5ML; UG/.5ML; UG/.5ML
0.5 INJECTION, SUSPENSION INTRAMUSCULAR ONCE
Refills: 0 | Status: COMPLETED | OUTPATIENT
Start: 2024-11-27 | End: 2024-11-27

## 2024-11-27 RX ADMIN — CLOSTRIDIUM TETANI TOXOID ANTIGEN (FORMALDEHYDE INACTIVATED), CORYNEBACTERIUM DIPHTHERIAE TOXOID ANTIGEN (FORMALDEHYDE INACTIVATED), BORDETELLA PERTUSSIS TOXOID ANTIGEN (GLUTARALDEHYDE INACTIVATED), BORDETELLA PERTUSSIS FILAMENTOUS HEMAGGLUTININ ANTIGEN (FORMALDEHYDE INACTIVATED), BORDETELLA PERTUSSIS PERTACTIN ANTIGEN, AND BORDETELLA PERTUSSIS FIMBRIAE 2/3 ANTIGEN 0.5 MILLILITER(S): 5; 2; 2.5; 5; 3; 5 INJECTION, SUSPENSION INTRAMUSCULAR at 13:17

## 2024-11-27 NOTE — ED STATDOCS - ATTENDING APP SHARED VISIT CONTRIBUTION OF CARE
I,Osmani Vargas MD,  performed the initial face to face bedside interview with this patient regarding history of present illness, review of symptoms and relevant past medical, social and family history.  I completed an independent physical examination.  I was the initial provider who evaluated this patient. I have signed out the follow up of any pending tests (i.e. labs, radiological studies) to the ACP.  I have communicated the patient’s plan of care and disposition with the ACP.  The history, relevant review of systems, past medical and surgical history, medical decision making, and physical examination was documented by the scribe in my presence and I attest to the accuracy of the documentation.
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
## 2024-11-27 NOTE — ED STATDOCS - PATIENT PORTAL LINK FT
You can access the FollowMyHealth Patient Portal offered by [facility] by registering at the following website: [email or web address]. By joining [employer]’s FollowMyHealth portal, you will also be able to view your health information using other applications (apps) compatible with our system.
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
## 2024-11-27 NOTE — ED STATDOCS - NSFOLLOWUPINSTRUCTIONS_ED_ALL_ED_FT
Apply Bacitracin 2 times a day to area involved.      Do not open or remove blisters.      continue Ibuprfoen / Aleve for pain      Second-Degree Burn, Adult  A second-degree burn, or partial thickness wound, is a burn that affects the top two layers of skin. These layers are called the epidermis and dermis.    What are the causes?  A second-degree burn may be caused by:  Heat. This may be from a flame or hot liquid.  Radiation. This may be from sunlight or cancer treatments.  Electricity. This may be from a lightning strike or touching an outlet or power line.  Certain chemicals, such as acids. Some chemicals can go through clothing.  What increases the risk?  You may be more likely to get a second-degree burn if:  You are around a lot of open flames, chemicals, or electricity.  You have cancer and are being treated with radiation.  What are the signs or symptoms?  A hand with a reddened and blistered area from a burn.   Symptoms of a second-degree burn include:  Severe pain.  Skin changes. Your skin may turn deep red or blister. It may also be tender, swollen, blotchy, or shiny.  How is this diagnosed?  A second-degree burn can be diagnosed with a physical exam. Your health care provider may remove blistered skin during the exam.    It may take a few days to see how bad the burn is. You may be told to watch the burn for changes at home. You may also need to visit a provider to have it checked again.    How is this treated?  Treatment depends on how bad the burn is and what caused it. Treatment may include:  Cooling the burn. This may be done with cool water or with a cold, wet cloth (cold compress). Do not put ice on the burn.  Taking or applying antibiotics, ointments, or other medicines.  Getting a tetanus shot.  Covering the burn with a bandage (dressing).  Putting pressure (compression) dressings on the burn.  Removing dead skin. This is called debridement. It is done by a provider. Do not try to remove dead skin yourself.  If your burn needs to be treated in a hospital, you may need:  Surgery to remove dead skin or scabs.  Fluids and nutrition given through an IV.  Oxygen given through a mask or a machine (ventilator).  Close monitoring to make sure blood can flow around the wound.  Follow these instructions at home:  Medicines    Take or apply over-the-counter and prescription medicines only as told by your provider.  If you were prescribed antibiotics or ointments, take or apply them as told by your provider. Do not stop using the antibiotic even if you start to feel better.  Eating and drinking    Eat healthy foods. Eat a lot of protein. This will help your burn heal.  Drink enough fluid to keep your pee (urine) pale yellow.  Wound care    Washing hands with soap and water.  Follow instructions from your provider about how to take care of your wound. Make sure you:  Wash your hands with soap and water for at least 20 seconds before and after you change your dressing. If soap and water are not available, use hand .  Change your dressing as told by your provider.  If you have a compression dressing, wear it as told by your provider.  Clean your wound 2 times a day, or as told by your provider.  Wash it with mild soap and water.  Rinse it with water to remove all soap.  Pat it dry with a towel. Do not rub it.  Protect your wound from the sun.  Preventing infection    Do not scratch or pick at your wound.  Do not break blisters or peel any skin.  Do not rub your wound.  Check your wound every day for signs of infection. Check for:  More redness, swelling, or pain.  Fluid or blood.  Warmth.  Pus or a bad smell.  Activity    Rest as told by your provider. Do not exercise until your provider says that you can.  Do exercises as told by your provider.  General instructions    Do not take baths, swim, or use a hot tub until your provider approves. Ask your provider if you may take showers. You may only be allowed to take sponge baths.  Do not use any products that contain nicotine or tobacco. These products include cigarettes, chewing tobacco, and vaping devices, such as e-cigarettes. These can delay healing. If you need help quitting, ask your provider.  If you can, raise (elevate) the burned area above the level of your heart while sitting or lying down.  Keep all follow-up visits. Your provider will check how your burn is healing.  How is this prevented?  Set your water heater to 120°F (49°C) or lower.  Make sure you know how to get out of your home in case of a fire.  Install smoke alarms in your home. Check them often to make sure they are working.  Contact a health care provider if:  Your symptoms do not get better with treatment.  Your pain does not get better with medicine.  Your wound has signs of infection.  You have a fever or chills.  Get help right away if:  You get red streaks near the wound.  You develop severe pain.  This information is not intended to replace advice given to you by your health care provider. Make sure you discuss any questions you have with your health care provider.    Document Revised: 01/08/2024 Document Reviewed: 11/28/2023  [employer] Patient Education © 2024 [employer]  [employer] logo  Terms and Conditions  Privacy Policy  Editorial Policy  All content on this site: Copyright © 2024 [employer], its licensors, and contributors. All rights are reserved, including those for text and data mining, AI training, and similar technologies. For all open access content, the Creative Commons licensing terms apply.  Cookies are used by this site. To decline or learn more, visit our Cookies page.
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
## 2024-12-10 ENCOUNTER — APPOINTMENT (OUTPATIENT)
Dept: NEUROLOGY | Facility: CLINIC | Age: 64
End: 2024-12-10

## 2024-12-13 ENCOUNTER — APPOINTMENT (OUTPATIENT)
Dept: FAMILY MEDICINE | Facility: CLINIC | Age: 64
End: 2024-12-13
Payer: MEDICARE

## 2024-12-13 ENCOUNTER — RX RENEWAL (OUTPATIENT)
Age: 64
End: 2024-12-13

## 2024-12-13 VITALS
BODY MASS INDEX: 29.78 KG/M2 | HEIGHT: 70 IN | RESPIRATION RATE: 16 BRPM | DIASTOLIC BLOOD PRESSURE: 84 MMHG | WEIGHT: 208 LBS | HEART RATE: 60 BPM | SYSTOLIC BLOOD PRESSURE: 156 MMHG | TEMPERATURE: 98.7 F | OXYGEN SATURATION: 98 %

## 2024-12-13 DIAGNOSIS — Z12.39 ENCOUNTER FOR OTHER SCREENING FOR MALIGNANT NEOPLASM OF BREAST: ICD-10-CM

## 2024-12-13 DIAGNOSIS — Z87.39 PERSONAL HISTORY OF OTHER DISEASES OF THE MUSCULOSKELETAL SYSTEM AND CONNECTIVE TISSUE: ICD-10-CM

## 2024-12-13 DIAGNOSIS — Z23 ENCOUNTER FOR IMMUNIZATION: ICD-10-CM

## 2024-12-13 DIAGNOSIS — N60.09 SOLITARY CYST OF UNSPECIFIED BREAST: ICD-10-CM

## 2024-12-13 DIAGNOSIS — R79.89 OTHER SPECIFIED ABNORMAL FINDINGS OF BLOOD CHEMISTRY: ICD-10-CM

## 2024-12-13 DIAGNOSIS — T14.8XXA OTHER INJURY OF UNSPECIFIED BODY REGION, INITIAL ENCOUNTER: ICD-10-CM

## 2024-12-13 DIAGNOSIS — U07.1 COVID-19: ICD-10-CM

## 2024-12-13 DIAGNOSIS — I77.810 THORACIC AORTIC ECTASIA: ICD-10-CM

## 2024-12-13 DIAGNOSIS — Z01.810 ENCOUNTER FOR PREPROCEDURAL CARDIOVASCULAR EXAMINATION: ICD-10-CM

## 2024-12-13 DIAGNOSIS — Z86.39 PERSONAL HISTORY OF OTHER ENDOCRINE, NUTRITIONAL AND METABOLIC DISEASE: ICD-10-CM

## 2024-12-13 DIAGNOSIS — M25.551 PAIN IN RIGHT HIP: ICD-10-CM

## 2024-12-13 DIAGNOSIS — N39.0 URINARY TRACT INFECTION, SITE NOT SPECIFIED: ICD-10-CM

## 2024-12-13 DIAGNOSIS — Z98.890 OTHER SPECIFIED POSTPROCEDURAL STATES: ICD-10-CM

## 2024-12-13 DIAGNOSIS — I10 ESSENTIAL (PRIMARY) HYPERTENSION: ICD-10-CM

## 2024-12-13 DIAGNOSIS — G43.909 MIGRAINE, UNSPECIFIED, NOT INTRACTABLE, W/OUT STATUS MIGRAINOSUS: ICD-10-CM

## 2024-12-13 DIAGNOSIS — Z01.818 ENCOUNTER FOR OTHER PREPROCEDURAL EXAMINATION: ICD-10-CM

## 2024-12-13 DIAGNOSIS — R22.9 LOCALIZED SWELLING, MASS AND LUMP, UNSPECIFIED: ICD-10-CM

## 2024-12-13 DIAGNOSIS — R74.01 ELEVATION OF LEVELS OF LIVER TRANSAMINASE LEVELS: ICD-10-CM

## 2024-12-13 DIAGNOSIS — Z20.822 CONTACT WITH AND (SUSPECTED) EXPOSURE TO COVID-19: ICD-10-CM

## 2024-12-13 DIAGNOSIS — E78.5 HYPERLIPIDEMIA, UNSPECIFIED: ICD-10-CM

## 2024-12-13 PROCEDURE — 90677 PCV20 VACCINE IM: CPT

## 2024-12-13 PROCEDURE — G0009: CPT

## 2024-12-13 PROCEDURE — G0402 INITIAL PREVENTIVE EXAM: CPT

## 2025-01-09 ENCOUNTER — RX RENEWAL (OUTPATIENT)
Age: 65
End: 2025-01-09

## 2025-02-04 DIAGNOSIS — U07.1 COVID-19: ICD-10-CM

## 2025-02-04 RX ORDER — NIRMATRELVIR AND RITONAVIR 300-100 MG
20 X 150 MG & KIT ORAL
Qty: 1 | Refills: 0 | Status: ACTIVE | COMMUNITY
Start: 2025-02-04 | End: 1900-01-01

## 2025-02-06 NOTE — HISTORY OF PRESENT ILLNESS
[de-identified] : This is a 62-year-old female presents with a new complaint of pain left knee.  1 month ago developed spontaneous onset intermittent pain lateral aspect left knee.  Pain radiates down mid lateral aspect left leg and mid lateral aspect distal two thirds left thigh.  Symptoms provoked by going up and down stairs squatting.  Symptom-free when walking.  Denies locking but does report associated swelling hide

## 2025-02-24 DIAGNOSIS — E78.5 HYPERLIPIDEMIA, UNSPECIFIED: ICD-10-CM

## 2025-03-03 ENCOUNTER — RX RENEWAL (OUTPATIENT)
Age: 65
End: 2025-03-03

## 2025-03-06 LAB
ALBUMIN SERPL ELPH-MCNC: 4.6 G/DL
ALP BLD-CCNC: 78 U/L
ALT SERPL-CCNC: 29 U/L
AST SERPL-CCNC: 25 U/L
BILIRUB DIRECT SERPL-MCNC: 0.1 MG/DL
BILIRUB INDIRECT SERPL-MCNC: 0.4 MG/DL
BILIRUB SERPL-MCNC: 0.5 MG/DL
CHOLEST SERPL-MCNC: 272 MG/DL
HDLC SERPL-MCNC: 86 MG/DL
LDLC SERPL CALC-MCNC: 153 MG/DL
NONHDLC SERPL-MCNC: 186 MG/DL
PROT SERPL-MCNC: 6.8 G/DL
TRIGL SERPL-MCNC: 187 MG/DL

## 2025-03-11 ENCOUNTER — NON-APPOINTMENT (OUTPATIENT)
Age: 65
End: 2025-03-11

## 2025-03-11 DIAGNOSIS — E55.9 VITAMIN D DEFICIENCY, UNSPECIFIED: ICD-10-CM

## 2025-03-11 DIAGNOSIS — Z13.1 ENCOUNTER FOR SCREENING FOR DIABETES MELLITUS: ICD-10-CM

## 2025-03-12 DIAGNOSIS — E78.5 HYPERLIPIDEMIA, UNSPECIFIED: ICD-10-CM

## 2025-03-12 RX ORDER — EZETIMIBE 10 MG/1
10 TABLET ORAL
Qty: 90 | Refills: 2 | Status: ACTIVE | COMMUNITY
Start: 2025-03-12 | End: 1900-01-01

## 2025-03-13 ENCOUNTER — RX RENEWAL (OUTPATIENT)
Age: 65
End: 2025-03-13

## 2025-04-07 ENCOUNTER — RX RENEWAL (OUTPATIENT)
Age: 65
End: 2025-04-07

## 2025-04-28 ENCOUNTER — RX RENEWAL (OUTPATIENT)
Age: 65
End: 2025-04-28

## 2025-05-13 NOTE — H&P PST ADULT - PROBLEM SELECTOR PLAN 3
Writer called patient in regards to results and was unable to leave voicemail as voicemail box is full.     Sent patient [place] message to contact our office.     Will attempt later    pt will take levothyroxine AM of surgery as prescribed

## 2025-05-15 ENCOUNTER — NON-APPOINTMENT (OUTPATIENT)
Age: 65
End: 2025-05-15

## 2025-05-15 DIAGNOSIS — I70.90 UNSPECIFIED ATHEROSCLEROSIS: ICD-10-CM

## 2025-05-19 PROBLEM — I70.90 ARTERIAL CALCIFICATION: Status: ACTIVE | Noted: 2025-05-19

## 2025-05-21 ENCOUNTER — NON-APPOINTMENT (OUTPATIENT)
Age: 65
End: 2025-05-21

## 2025-05-21 ENCOUNTER — TRANSCRIPTION ENCOUNTER (OUTPATIENT)
Age: 65
End: 2025-05-21

## 2025-05-21 DIAGNOSIS — R92.8 OTHER ABNORMAL AND INCONCLUSIVE FINDINGS ON DIAGNOSTIC IMAGING OF BREAST: ICD-10-CM

## 2025-05-22 ENCOUNTER — TRANSCRIPTION ENCOUNTER (OUTPATIENT)
Age: 65
End: 2025-05-22

## 2025-06-03 ENCOUNTER — APPOINTMENT (OUTPATIENT)
Dept: ORTHOPEDIC SURGERY | Facility: CLINIC | Age: 65
End: 2025-06-03
Payer: MEDICARE

## 2025-06-03 VITALS — HEIGHT: 70 IN | WEIGHT: 208 LBS | BODY MASS INDEX: 29.78 KG/M2

## 2025-06-03 DIAGNOSIS — M67.431 GANGLION, RIGHT WRIST: ICD-10-CM

## 2025-06-03 DIAGNOSIS — M65.939 UNSPECIFIED SYNOVITIS AND TENOSYNOVITIS, UNSPECIFIED FOREARM: ICD-10-CM

## 2025-06-03 PROCEDURE — 99213 OFFICE O/P EST LOW 20 MIN: CPT

## 2025-06-07 ENCOUNTER — RX RENEWAL (OUTPATIENT)
Age: 65
End: 2025-06-07

## 2025-06-10 ENCOUNTER — RX RENEWAL (OUTPATIENT)
Age: 65
End: 2025-06-10

## 2025-06-16 ENCOUNTER — NON-APPOINTMENT (OUTPATIENT)
Age: 65
End: 2025-06-16

## 2025-06-17 ENCOUNTER — APPOINTMENT (OUTPATIENT)
Dept: FAMILY MEDICINE | Facility: CLINIC | Age: 65
End: 2025-06-17
Payer: MEDICARE

## 2025-06-17 VITALS
OXYGEN SATURATION: 98 % | DIASTOLIC BLOOD PRESSURE: 86 MMHG | RESPIRATION RATE: 16 BRPM | TEMPERATURE: 97.8 F | BODY MASS INDEX: 29.78 KG/M2 | HEIGHT: 70 IN | WEIGHT: 208 LBS | HEART RATE: 53 BPM | SYSTOLIC BLOOD PRESSURE: 142 MMHG

## 2025-06-17 PROBLEM — Z01.818 PREOPERATIVE EXAMINATION: Status: ACTIVE | Noted: 2025-06-17

## 2025-06-17 PROCEDURE — 99214 OFFICE O/P EST MOD 30 MIN: CPT

## 2025-06-19 ENCOUNTER — OUTPATIENT (OUTPATIENT)
Dept: OUTPATIENT SERVICES | Facility: HOSPITAL | Age: 65
LOS: 1 days | End: 2025-06-19
Payer: MEDICARE

## 2025-06-19 VITALS
OXYGEN SATURATION: 98 % | WEIGHT: 207.9 LBS | RESPIRATION RATE: 16 BRPM | HEART RATE: 61 BPM | HEIGHT: 70 IN | TEMPERATURE: 98 F | DIASTOLIC BLOOD PRESSURE: 83 MMHG | SYSTOLIC BLOOD PRESSURE: 134 MMHG

## 2025-06-19 DIAGNOSIS — Z96.642 PRESENCE OF LEFT ARTIFICIAL HIP JOINT: Chronic | ICD-10-CM

## 2025-06-19 DIAGNOSIS — Z90.710 ACQUIRED ABSENCE OF BOTH CERVIX AND UTERUS: Chronic | ICD-10-CM

## 2025-06-19 DIAGNOSIS — Z96.641 PRESENCE OF RIGHT ARTIFICIAL HIP JOINT: Chronic | ICD-10-CM

## 2025-06-19 DIAGNOSIS — N81.9 FEMALE GENITAL PROLAPSE, UNSPECIFIED: Chronic | ICD-10-CM

## 2025-06-19 DIAGNOSIS — Z01.818 ENCOUNTER FOR OTHER PREPROCEDURAL EXAMINATION: ICD-10-CM

## 2025-06-19 DIAGNOSIS — M67.431 GANGLION, RIGHT WRIST: ICD-10-CM

## 2025-06-19 PROBLEM — M17.12 UNILATERAL PRIMARY OSTEOARTHRITIS, LEFT KNEE: Chronic | Status: INACTIVE | Noted: 2023-06-28 | Resolved: 2025-06-19

## 2025-06-19 LAB
ANION GAP SERPL CALC-SCNC: 9 MMOL/L — SIGNIFICANT CHANGE UP (ref 5–17)
BUN SERPL-MCNC: 19 MG/DL — SIGNIFICANT CHANGE UP (ref 7–23)
CALCIUM SERPL-MCNC: 9.6 MG/DL — SIGNIFICANT CHANGE UP (ref 8.4–10.5)
CHLORIDE SERPL-SCNC: 104 MMOL/L — SIGNIFICANT CHANGE UP (ref 96–108)
CO2 SERPL-SCNC: 28 MMOL/L — SIGNIFICANT CHANGE UP (ref 22–31)
CREAT SERPL-MCNC: 0.96 MG/DL — SIGNIFICANT CHANGE UP (ref 0.5–1.3)
EGFR: 65 ML/MIN/1.73M2 — SIGNIFICANT CHANGE UP
EGFR: 65 ML/MIN/1.73M2 — SIGNIFICANT CHANGE UP
GLUCOSE SERPL-MCNC: 86 MG/DL — SIGNIFICANT CHANGE UP (ref 70–99)
HCT VFR BLD CALC: 42 % — SIGNIFICANT CHANGE UP (ref 34.5–45)
HGB BLD-MCNC: 13.9 G/DL — SIGNIFICANT CHANGE UP (ref 11.5–15.5)
MCHC RBC-ENTMCNC: 30.5 PG — SIGNIFICANT CHANGE UP (ref 27–34)
MCHC RBC-ENTMCNC: 33.1 G/DL — SIGNIFICANT CHANGE UP (ref 32–36)
MCV RBC AUTO: 92.1 FL — SIGNIFICANT CHANGE UP (ref 80–100)
NRBC # BLD AUTO: 0 K/UL — SIGNIFICANT CHANGE UP (ref 0–0)
NRBC # FLD: 0 K/UL — SIGNIFICANT CHANGE UP (ref 0–0)
NRBC BLD AUTO-RTO: 0 /100 WBCS — SIGNIFICANT CHANGE UP (ref 0–0)
PLATELET # BLD AUTO: 220 K/UL — SIGNIFICANT CHANGE UP (ref 150–400)
PMV BLD: 9.4 FL — SIGNIFICANT CHANGE UP (ref 7–13)
POTASSIUM SERPL-MCNC: 3.7 MMOL/L — SIGNIFICANT CHANGE UP (ref 3.5–5.3)
POTASSIUM SERPL-SCNC: 3.7 MMOL/L — SIGNIFICANT CHANGE UP (ref 3.5–5.3)
RBC # BLD: 4.56 M/UL — SIGNIFICANT CHANGE UP (ref 3.8–5.2)
RBC # FLD: 13.2 % — SIGNIFICANT CHANGE UP (ref 10.3–14.5)
SODIUM SERPL-SCNC: 141 MMOL/L — SIGNIFICANT CHANGE UP (ref 135–145)
WBC # BLD: 4.88 K/UL — SIGNIFICANT CHANGE UP (ref 3.8–10.5)
WBC # FLD AUTO: 4.88 K/UL — SIGNIFICANT CHANGE UP (ref 3.8–10.5)

## 2025-06-19 PROCEDURE — G0463: CPT

## 2025-06-19 PROCEDURE — 85027 COMPLETE CBC AUTOMATED: CPT

## 2025-06-19 PROCEDURE — 93010 ELECTROCARDIOGRAM REPORT: CPT

## 2025-06-19 PROCEDURE — 36415 COLL VENOUS BLD VENIPUNCTURE: CPT

## 2025-06-19 PROCEDURE — 80048 BASIC METABOLIC PNL TOTAL CA: CPT

## 2025-06-19 PROCEDURE — 93005 ELECTROCARDIOGRAM TRACING: CPT

## 2025-06-19 NOTE — H&P PST ADULT - HISTORY OF PRESENT ILLNESS
66 y/o female with right distal radio ulnar ganglion cyst for 3 years. Last couple of weeks more swollen and bothers with pressure, aspiration done and was advised surgery

## 2025-06-19 NOTE — H&P PST ADULT - ASSESSMENT
64 y/o female with right distal radioulnar ganglion cyst  Planned surgtery.-right distal radioulnar ganglion cyst excision    Will obtain medical clearance/cardiac clearance  Pre op instructions provided  Instructions provided on medications to continue and to take the day morning of surgery   64 y/o female with right distal radioulnar ganglion cyst  Planned surgtery. -right distal radioulnar ganglion cyst excision    Will obtain medical clearance/cardiac clearance  Pre op instructions provided  Instructions provided on medications to continue and to take the day morning of surgery

## 2025-06-19 NOTE — H&P PST ADULT - NSICDXPASTSURGICALHX_GEN_ALL_CORE_FT
PAST SURGICAL HISTORY:  History of total left hip replacement 2014    Normal vaginal delivery x 3    Pelvic prolapse laparoscopic colpopexy 2019    S/P hip replacement, left     S/P hip replacement, right     S/P hysterectomy 2004 after prolapse

## 2025-06-19 NOTE — H&P PST ADULT - NSICDXPASTMEDICALHX_GEN_ALL_CORE_FT
PAST MEDICAL HISTORY:  Ascending aorta dilation     Bulging lumbar disc L4-5; treated with PINA (last in June 2013)    Cervical disc disease herniated discs; unsure levels (asymptomatic)    Cluster headache     COVID-19 vaccine series completed moderna 4/2/21    Diverticulosis asymptomatic    Esophageal spasm endoscopy and colonoscopy negative; no reflux    History of recurrent UTIs stable since colpopexy    HTN (hypertension)     Hyperlipemia     Hypothyroid     Osteoarthritis hip    Restless leg

## 2025-06-20 ENCOUNTER — APPOINTMENT (OUTPATIENT)
Dept: CARDIOLOGY | Facility: CLINIC | Age: 65
End: 2025-06-20
Payer: MEDICARE

## 2025-06-20 VITALS
OXYGEN SATURATION: 98 % | DIASTOLIC BLOOD PRESSURE: 84 MMHG | HEART RATE: 70 BPM | SYSTOLIC BLOOD PRESSURE: 127 MMHG | RESPIRATION RATE: 19 BRPM

## 2025-06-20 PROCEDURE — 99204 OFFICE O/P NEW MOD 45 MIN: CPT

## 2025-06-20 PROCEDURE — 93000 ELECTROCARDIOGRAM COMPLETE: CPT | Mod: NC

## 2025-06-27 ENCOUNTER — TRANSCRIPTION ENCOUNTER (OUTPATIENT)
Age: 65
End: 2025-06-27

## 2025-06-27 ENCOUNTER — RESULT REVIEW (OUTPATIENT)
Age: 65
End: 2025-06-27

## 2025-06-27 ENCOUNTER — OUTPATIENT (OUTPATIENT)
Dept: OUTPATIENT SERVICES | Facility: HOSPITAL | Age: 65
LOS: 1 days | End: 2025-06-27
Payer: MEDICARE

## 2025-06-27 ENCOUNTER — APPOINTMENT (OUTPATIENT)
Dept: ORTHOPEDIC SURGERY | Facility: HOSPITAL | Age: 65
End: 2025-06-27

## 2025-06-27 VITALS
OXYGEN SATURATION: 100 % | RESPIRATION RATE: 20 BRPM | HEART RATE: 56 BPM | HEIGHT: 70 IN | DIASTOLIC BLOOD PRESSURE: 93 MMHG | TEMPERATURE: 97 F | SYSTOLIC BLOOD PRESSURE: 162 MMHG | WEIGHT: 209 LBS

## 2025-06-27 VITALS
HEART RATE: 55 BPM | DIASTOLIC BLOOD PRESSURE: 88 MMHG | TEMPERATURE: 98 F | SYSTOLIC BLOOD PRESSURE: 122 MMHG | RESPIRATION RATE: 14 BRPM | OXYGEN SATURATION: 98 %

## 2025-06-27 DIAGNOSIS — Z96.642 PRESENCE OF LEFT ARTIFICIAL HIP JOINT: Chronic | ICD-10-CM

## 2025-06-27 DIAGNOSIS — M67.431 GANGLION, RIGHT WRIST: ICD-10-CM

## 2025-06-27 DIAGNOSIS — Z90.710 ACQUIRED ABSENCE OF BOTH CERVIX AND UTERUS: Chronic | ICD-10-CM

## 2025-06-27 DIAGNOSIS — Z96.641 PRESENCE OF RIGHT ARTIFICIAL HIP JOINT: Chronic | ICD-10-CM

## 2025-06-27 DIAGNOSIS — N81.9 FEMALE GENITAL PROLAPSE, UNSPECIFIED: Chronic | ICD-10-CM

## 2025-06-27 PROCEDURE — 25111 REMOVE WRIST TENDON LESION: CPT | Mod: RT

## 2025-06-27 PROCEDURE — 88304 TISSUE EXAM BY PATHOLOGIST: CPT | Mod: 26

## 2025-06-27 PROCEDURE — 25073 EXC FOREARM TUM DEEP 3 CM/>: CPT | Mod: RT

## 2025-06-27 PROCEDURE — 88304 TISSUE EXAM BY PATHOLOGIST: CPT

## 2025-06-27 RX ORDER — ONDANSETRON HCL/PF 4 MG/2 ML
4 VIAL (ML) INJECTION ONCE
Refills: 0 | Status: DISCONTINUED | OUTPATIENT
Start: 2025-06-27 | End: 2025-06-27

## 2025-06-27 RX ORDER — CEFAZOLIN SODIUM IN 0.9 % NACL 3 G/100 ML
2000 INTRAVENOUS SOLUTION, PIGGYBACK (ML) INTRAVENOUS ONCE
Refills: 0 | Status: COMPLETED | OUTPATIENT
Start: 2025-06-27 | End: 2025-06-27

## 2025-06-27 RX ORDER — SODIUM CHLORIDE 9 G/1000ML
1000 INJECTION, SOLUTION INTRAVENOUS
Refills: 0 | Status: DISCONTINUED | OUTPATIENT
Start: 2025-06-27 | End: 2025-06-27

## 2025-06-27 RX ORDER — OXYCODONE HYDROCHLORIDE AND ACETAMINOPHEN 10; 325 MG/1; MG/1
1 TABLET ORAL
Qty: 5 | Refills: 0
Start: 2025-06-27

## 2025-06-27 RX ORDER — EZETIMIBE 10 MG/1
1 TABLET ORAL
Refills: 0 | DISCHARGE

## 2025-06-27 RX ORDER — HYDROMORPHONE/SOD CHLOR,ISO/PF 2 MG/10 ML
1 SYRINGE (ML) INJECTION
Refills: 0 | Status: DISCONTINUED | OUTPATIENT
Start: 2025-06-27 | End: 2025-06-27

## 2025-06-27 RX ORDER — HYDROMORPHONE/SOD CHLOR,ISO/PF 2 MG/10 ML
0.5 SYRINGE (ML) INJECTION
Refills: 0 | Status: DISCONTINUED | OUTPATIENT
Start: 2025-06-27 | End: 2025-06-27

## 2025-06-27 RX ORDER — IBUPROFEN 200 MG
1 TABLET ORAL
Qty: 30 | Refills: 0
Start: 2025-06-27

## 2025-06-27 RX ADMIN — Medication 1 APPLICATION(S): at 07:16

## 2025-06-27 RX ADMIN — SODIUM CHLORIDE 75 MILLILITER(S): 9 INJECTION, SOLUTION INTRAVENOUS at 10:24

## 2025-06-27 NOTE — ASU PATIENT PROFILE, ADULT - FALL HARM RISK - UNIVERSAL INTERVENTIONS
Bed in lowest position, wheels locked, appropriate side rails in place/Call bell, personal items and telephone in reach/Instruct patient to call for assistance before getting out of bed or chair/Non-slip footwear when patient is out of bed/[place] to call system/Physically safe environment - no spills, clutter or unnecessary equipment/Purposeful Proactive Rounding/Room/bathroom lighting operational, light cord in reach

## 2025-06-27 NOTE — ASU DISCHARGE PLAN (ADULT/PEDIATRIC) - ASU DC SPECIAL INSTRUCTIONSFT
Elevate   arm in sling daily when up & walking.  Elevate the  hand/arm above heart level on pillow/blankets when lying down.  Apply ice packs to top of surgical site for 20 min on and off  Keep bandage clean, dry , & intact until seen in office  May open & close the fingers of the operated arm every hour for exercise.  Call the Dr.  for fever, severe pain, fall or hand injury.  Call for an appointment for office visit  in  10 days.

## 2025-06-27 NOTE — ASU DISCHARGE PLAN (ADULT/PEDIATRIC) - CARE PROVIDER_API CALL
Fred Inman  Orthopaedic Surgery  [address]  [place] [address]  Phone: [phone]  Fax: [phone]  Follow Up Time:

## 2025-06-27 NOTE — ASU DISCHARGE PLAN (ADULT/PEDIATRIC) - FINANCIAL ASSISTANCE
[facility] provides services at a reduced cost to those who are determined to be eligible through [facility]’s financial assistance program. Information regarding [facility]’s financial assistance program can be found by going to [email or web address] or by calling [phone].

## 2025-07-07 ENCOUNTER — APPOINTMENT (OUTPATIENT)
Dept: ORTHOPEDIC SURGERY | Facility: CLINIC | Age: 65
End: 2025-07-07
Payer: MEDICARE

## 2025-07-07 VITALS — HEIGHT: 70 IN | BODY MASS INDEX: 29.63 KG/M2 | WEIGHT: 207 LBS

## 2025-07-07 PROCEDURE — 99024 POSTOP FOLLOW-UP VISIT: CPT

## 2025-07-19 ENCOUNTER — NON-APPOINTMENT (OUTPATIENT)
Age: 65
End: 2025-07-19

## 2025-07-21 ENCOUNTER — NON-APPOINTMENT (OUTPATIENT)
Age: 65
End: 2025-07-21

## 2025-08-01 ENCOUNTER — APPOINTMENT (OUTPATIENT)
Dept: CARDIOLOGY | Facility: CLINIC | Age: 65
End: 2025-08-01
Payer: MEDICARE

## 2025-08-01 PROCEDURE — 93306 TTE W/DOPPLER COMPLETE: CPT

## 2025-08-05 ENCOUNTER — NON-APPOINTMENT (OUTPATIENT)
Age: 65
End: 2025-08-05

## 2025-08-18 ENCOUNTER — RX RENEWAL (OUTPATIENT)
Age: 65
End: 2025-08-18

## 2025-09-16 ENCOUNTER — RX RENEWAL (OUTPATIENT)
Age: 65
End: 2025-09-16

## (undated) DEVICE — MAKO BLADE STANDARD

## (undated) DEVICE — DRSG AQUACEL 3.5 X 12"

## (undated) DEVICE — GOWN XXL

## (undated) DEVICE — MAKO VIZADISC KNEE TRACKING KIT

## (undated) DEVICE — FRAZIER SUCTION TIP 8FR

## (undated) DEVICE — WARMING BLANKET LOWER ADULT

## (undated) DEVICE — DRAPE 3/4 SHEET 52X76"

## (undated) DEVICE — SOL IRR POUR H2O 1500ML

## (undated) DEVICE — GLV 7 PROTEXIS (WHITE)

## (undated) DEVICE — HANDPIECE INTERPULSE W/ COAXIAL FAN SPRAY TIP

## (undated) DEVICE — DRSG CURITY GAUZE SPONGE 4 X 4" 12-PLY

## (undated) DEVICE — VENODYNE/SCD SLEEVE CALF MEDIUM

## (undated) DEVICE — POSITIONER STRAP ARMBOARD VELCRO TS-30

## (undated) DEVICE — TOURNIQUET CUFF 34" DUAL PORT W PLC

## (undated) DEVICE — MAKO DRAPE KIT

## (undated) DEVICE — MARKING PEN W RULER

## (undated) DEVICE — DRAPE EXTREMITY 87" X 106" X 128"

## (undated) DEVICE — SUT VICRYL 2-0 27" PS-2 UNDYED

## (undated) DEVICE — SOL IRR BAG NS 0.9% 3000ML

## (undated) DEVICE — MAKO CHECKPOINT KIT FEMORAL / TIBIAL

## (undated) DEVICE — SOL IRR POUR H2O 1000ML

## (undated) DEVICE — DRSG XEROFORM 1 X 8"

## (undated) DEVICE — SOL IRR POUR NS 0.9% 1000ML

## (undated) DEVICE — SLING ARM CHIEFTAIN MESH LARGE

## (undated) DEVICE — ELCTR GROUNDING PAD ADULT COVIDIEN

## (undated) DEVICE — TAPE SILK 3"

## (undated) DEVICE — SUT QUILL MONODERM 3-0 30CM 24MM

## (undated) DEVICE — CANISTER DISPOSABLE THIN WALL 3000CC

## (undated) DEVICE — WOUND IRR SURGIPHOR

## (undated) DEVICE — GLV 8 PROTEXIS (BLUE)

## (undated) DEVICE — DRAPE U POLY BLUE 60"X60"

## (undated) DEVICE — SUT VICRYL 1 36" CTX UNDYED

## (undated) DEVICE — DRSG COBAN 6"

## (undated) DEVICE — DRSG AQUACEL 3.5 X 10"

## (undated) DEVICE — NDL HYPO SAFE 21G X 1.5" (GREEN)

## (undated) DEVICE — SAW BLADE STRYKER THCK LONG 1.24X83.5X18.5MM

## (undated) DEVICE — ELCTR BOVIE PENCIL SMOKE EVACUATION

## (undated) DEVICE — DRSG ACE BANDAGE 6"

## (undated) DEVICE — TOURNIQUET CUFF 18" DUAL PORT SINGLE BLADDER W PLC  (BLACK)

## (undated) DEVICE — SOL IRR POUR NS 0.9% 1500ML

## (undated) DEVICE — PACK LIJ BASIC ORTHO

## (undated) DEVICE — SYR LUER LOK 20CC

## (undated) DEVICE — DRSG ACE BANDAGE 2"

## (undated) DEVICE — DRAPE SPLIT SHEET 77" X 120"

## (undated) DEVICE — STRYKER MIXEVAC 3 BONE CEMENT MIXER

## (undated) DEVICE — TOURNIQUET ESMARK 4"

## (undated) DEVICE — LABELS BLANK W PEN

## (undated) DEVICE — PREP CHLORAPREP HI-LITE ORANGE 26ML

## (undated) DEVICE — DRSG TELFA 3 X 4

## (undated) DEVICE — SAW BLADE STRYKER SAGITTAL 3 HOLE OSCILLATING

## (undated) DEVICE — PACK TOTAL JOINT

## (undated) DEVICE — GLV 7.5 PROTEXIS (WHITE)

## (undated) DEVICE — SUT VICRYL 0 27" OS-6 UNDYED

## (undated) DEVICE — HOOD T5 PEELAWAY

## (undated) DEVICE — PROTECTOR HEEL / ELBOW

## (undated) DEVICE — WARMING BLANKET FULL ADULT

## (undated) DEVICE — SUCTION YANKAUER NO CONTROL VENT

## (undated) DEVICE — SUT QUILL PDO 2 36CM 40MM

## (undated) DEVICE — SUT VICRYL PLUS 3-0 27" RB-1 UNDYED

## (undated) DEVICE — SAW BLADE STRYKER RECIPROCATING DOUBLE EDGE 68X12.5MM

## (undated) DEVICE — GLV 8 PROTEXIS (CREAM) MICRO

## (undated) DEVICE — SUT MONOSOF 5-0 18" P-13

## (undated) DEVICE — DRSG DERMABOND 0.7ML

## (undated) DEVICE — TOURNIQUET ESMARK 6"

## (undated) DEVICE — DRSG STOCKINETTE IMPERVIOUS XL

## (undated) DEVICE — PACK UPPER EXTREMITY

## (undated) DEVICE — DRAPE TOWEL BLUE 17" X 24"